# Patient Record
Sex: MALE | URBAN - METROPOLITAN AREA
[De-identification: names, ages, dates, MRNs, and addresses within clinical notes are randomized per-mention and may not be internally consistent; named-entity substitution may affect disease eponyms.]

---

## 2019-03-28 ENCOUNTER — COMMUNICATION - HEALTHEAST (OUTPATIENT)
Dept: HEALTH INFORMATION MANAGEMENT | Facility: CLINIC | Age: 69
End: 2019-03-28

## 2021-06-19 NOTE — LETTER
Letter by Mika Mccoy at      Author: Mika Mccoy Service: -- Author Type: --    Filed:  Encounter Date: 3/28/2019 Status: (Other)         2019       Malcom Chowdhury  [address]  [facility] [address]    Dear Malcom Chowdhury:    We are pleased to provide you with secure, online access to medical information for you and your family. Per your request, we have expanded your account to allow access to the records of the following family members:              Tito ANDREW Chowdhury (privilege never ends.)     How Do I Login?  1. In your Internet browser, go to [email or web address]  2. Click on Sign Up Now   3. Enter your [employer] Activation Code exactly as it appears below. This code will  14 days after it is generated. You will not need to use this code after you have completed the sign-up process. If you do not sign up before the expiration date, you must request a new code.     [employer] Activation Code: [email or web address]  Expires: 2019  9:13 AM    4. Enter in your date of birth and zip code.  5. Create a [employer] username. Think of one that is secure and easy to remember.  Your username must be between 6 and 20 characters.  6. Create a [employer] password. You can change your password at any time. Your password must be between 8 and 45 characters, contain at least two letters and one number, and contain both upper and lower case letters.  7. Choose a security question, enter your answer, and click Next. This can be used to access [employer] if you forget your password.   8. Enter a valid e-mail address to receive e-mail notifications when new information is available in [employer].  9. Click Sign In.        How Do I Access a Family Member's Account?  10. Select the account you want to access by clicking the [place] with the appropriate patient's name at the top of your screen.   11. You will see a disclaimer page letting you know that you will be viewing a family member's record. Review the  disclaimer and then click Accept Proxy Access Disclaimer to proceed.  12. Once you switch to viewing a family member's record, you can navigate to "[employer] pages the same way you would for yourself. You can return to your own account by clicking the [place] at the top of the screen with your name on it.    13. To customize colors and names of the linked accounts, you can select Personalize from the Profile dropdown menu at the top of the screen, then click the Edit button to make changes.      Additional Information  If you have questions, you can e-mail [email or web address] or call [phone] to talk to our [facility] "[employer] staff. Remember, "[employer] is NOT to be used for urgent needs. For medical emergencies, dial 911.

## 2025-07-02 ENCOUNTER — APPOINTMENT (OUTPATIENT)
Dept: CT IMAGING | Facility: HOSPITAL | Age: 75
End: 2025-07-02
Attending: EMERGENCY MEDICINE
Payer: COMMERCIAL

## 2025-07-02 ENCOUNTER — HOSPITAL ENCOUNTER (INPATIENT)
Facility: HOSPITAL | Age: 75
End: 2025-07-02
Attending: EMERGENCY MEDICINE | Admitting: STUDENT IN AN ORGANIZED HEALTH CARE EDUCATION/TRAINING PROGRAM
Payer: COMMERCIAL

## 2025-07-02 DIAGNOSIS — R18.8 OTHER ASCITES: ICD-10-CM

## 2025-07-02 DIAGNOSIS — K70.31 ALCOHOLIC CIRRHOSIS OF LIVER WITH ASCITES (H): ICD-10-CM

## 2025-07-02 DIAGNOSIS — I89.0 LYMPHEDEMA: Primary | ICD-10-CM

## 2025-07-02 DIAGNOSIS — G25.81 RESTLESS LEGS SYNDROME (RLS): ICD-10-CM

## 2025-07-02 DIAGNOSIS — K92.2 UPPER GI BLEED: ICD-10-CM

## 2025-07-02 DIAGNOSIS — R19.5 LOOSE STOOLS: ICD-10-CM

## 2025-07-02 LAB
ABO + RH BLD: NORMAL
ALBUMIN SERPL BCG-MCNC: 3.3 G/DL (ref 3.5–5.2)
ALBUMIN UR-MCNC: NEGATIVE MG/DL
ALP SERPL-CCNC: 190 U/L (ref 40–150)
ALT SERPL W P-5'-P-CCNC: 44 U/L (ref 0–70)
ANION GAP SERPL CALCULATED.3IONS-SCNC: 16 MMOL/L (ref 7–15)
APPEARANCE UR: CLEAR
AST SERPL W P-5'-P-CCNC: 161 U/L (ref 0–45)
BASOPHILS # BLD AUTO: 0.1 10E3/UL (ref 0–0.2)
BASOPHILS NFR BLD AUTO: 2 %
BILIRUB SERPL-MCNC: 5.8 MG/DL
BILIRUB UR QL STRIP: NEGATIVE
BLD GP AB SCN SERPL QL: NEGATIVE
BUN SERPL-MCNC: 7.9 MG/DL (ref 8–23)
CALCIUM SERPL-MCNC: 8.7 MG/DL (ref 8.8–10.4)
CHLORIDE SERPL-SCNC: 98 MMOL/L (ref 98–107)
COLOR UR AUTO: YELLOW
CREAT SERPL-MCNC: 0.58 MG/DL (ref 0.67–1.17)
EGFRCR SERPLBLD CKD-EPI 2021: >90 ML/MIN/1.73M2
EOSINOPHIL # BLD AUTO: 0.1 10E3/UL (ref 0–0.7)
EOSINOPHIL NFR BLD AUTO: 1 %
ERYTHROCYTE [DISTWIDTH] IN BLOOD BY AUTOMATED COUNT: 14.1 % (ref 10–15)
EST. AVERAGE GLUCOSE BLD GHB EST-MCNC: 114 MG/DL
ETHANOL SERPL-MCNC: 0.09 G/DL
GLUCOSE BLDC GLUCOMTR-MCNC: 100 MG/DL (ref 70–99)
GLUCOSE SERPL-MCNC: 141 MG/DL (ref 70–99)
GLUCOSE UR STRIP-MCNC: NEGATIVE MG/DL
HBA1C MFR BLD: 5.6 %
HCO3 SERPL-SCNC: 21 MMOL/L (ref 22–29)
HCT VFR BLD AUTO: 39.4 % (ref 40–53)
HGB BLD-MCNC: 13.3 G/DL (ref 13.3–17.7)
HGB UR QL STRIP: NEGATIVE
HOLD SPECIMEN: NORMAL
HOLD SPECIMEN: NORMAL
IMM GRANULOCYTES # BLD: 0 10E3/UL
IMM GRANULOCYTES NFR BLD: 0 %
INR PPP: 1.44 (ref 0.85–1.15)
KETONES UR STRIP-MCNC: 10 MG/DL
LEUKOCYTE ESTERASE UR QL STRIP: NEGATIVE
LYMPHOCYTES # BLD AUTO: 1 10E3/UL (ref 0.8–5.3)
LYMPHOCYTES NFR BLD AUTO: 13 %
MAGNESIUM SERPL-MCNC: 1.7 MG/DL (ref 1.7–2.3)
MCH RBC QN AUTO: 33.9 PG (ref 26.5–33)
MCHC RBC AUTO-ENTMCNC: 33.8 G/DL (ref 31.5–36.5)
MCV RBC AUTO: 101 FL (ref 78–100)
MONOCYTES # BLD AUTO: 0.9 10E3/UL (ref 0–1.3)
MONOCYTES NFR BLD AUTO: 11 %
NEUTROPHILS # BLD AUTO: 5.8 10E3/UL (ref 1.6–8.3)
NEUTROPHILS NFR BLD AUTO: 73 %
NITRATE UR QL: NEGATIVE
NRBC # BLD AUTO: 0 10E3/UL
NRBC BLD AUTO-RTO: 0 /100
PH UR STRIP: 6 [PH] (ref 5–7)
PHOSPHATE SERPL-MCNC: 2.6 MG/DL (ref 2.5–4.5)
PLATELET # BLD AUTO: 213 10E3/UL (ref 150–450)
POTASSIUM SERPL-SCNC: 3.6 MMOL/L (ref 3.4–5.3)
PROT SERPL-MCNC: 7.5 G/DL (ref 6.4–8.3)
PROTHROMBIN TIME: 17.8 SECONDS (ref 11.8–14.8)
RBC # BLD AUTO: 3.92 10E6/UL (ref 4.4–5.9)
RBC URINE: <1 /HPF
SODIUM SERPL-SCNC: 135 MMOL/L (ref 135–145)
SP GR UR STRIP: 1.01 (ref 1–1.03)
SPECIMEN EXP DATE BLD: NORMAL
UROBILINOGEN UR STRIP-MCNC: NORMAL MG/DL
WBC # BLD AUTO: 7.9 10E3/UL (ref 4–11)
WBC URINE: 1 /HPF

## 2025-07-02 PROCEDURE — 85004 AUTOMATED DIFF WBC COUNT: CPT | Performed by: EMERGENCY MEDICINE

## 2025-07-02 PROCEDURE — 250N000013 HC RX MED GY IP 250 OP 250 PS 637: Performed by: STUDENT IN AN ORGANIZED HEALTH CARE EDUCATION/TRAINING PROGRAM

## 2025-07-02 PROCEDURE — 85610 PROTHROMBIN TIME: CPT | Performed by: EMERGENCY MEDICINE

## 2025-07-02 PROCEDURE — 250N000011 HC RX IP 250 OP 636: Performed by: EMERGENCY MEDICINE

## 2025-07-02 PROCEDURE — 86901 BLOOD TYPING SEROLOGIC RH(D): CPT | Performed by: EMERGENCY MEDICINE

## 2025-07-02 PROCEDURE — 86900 BLOOD TYPING SEROLOGIC ABO: CPT | Performed by: EMERGENCY MEDICINE

## 2025-07-02 PROCEDURE — 82607 VITAMIN B-12: CPT | Performed by: STUDENT IN AN ORGANIZED HEALTH CARE EDUCATION/TRAINING PROGRAM

## 2025-07-02 PROCEDURE — 83036 HEMOGLOBIN GLYCOSYLATED A1C: CPT | Performed by: STUDENT IN AN ORGANIZED HEALTH CARE EDUCATION/TRAINING PROGRAM

## 2025-07-02 PROCEDURE — 83735 ASSAY OF MAGNESIUM: CPT | Performed by: STUDENT IN AN ORGANIZED HEALTH CARE EDUCATION/TRAINING PROGRAM

## 2025-07-02 PROCEDURE — P9047 ALBUMIN (HUMAN), 25%, 50ML: HCPCS | Performed by: STUDENT IN AN ORGANIZED HEALTH CARE EDUCATION/TRAINING PROGRAM

## 2025-07-02 PROCEDURE — 36415 COLL VENOUS BLD VENIPUNCTURE: CPT | Performed by: STUDENT IN AN ORGANIZED HEALTH CARE EDUCATION/TRAINING PROGRAM

## 2025-07-02 PROCEDURE — 84100 ASSAY OF PHOSPHORUS: CPT | Performed by: STUDENT IN AN ORGANIZED HEALTH CARE EDUCATION/TRAINING PROGRAM

## 2025-07-02 PROCEDURE — 74174 CTA ABD&PLVS W/CONTRAST: CPT

## 2025-07-02 PROCEDURE — 96374 THER/PROPH/DIAG INJ IV PUSH: CPT | Mod: 59

## 2025-07-02 PROCEDURE — HZ2ZZZZ DETOXIFICATION SERVICES FOR SUBSTANCE ABUSE TREATMENT: ICD-10-PCS | Performed by: HOSPITALIST

## 2025-07-02 PROCEDURE — 80053 COMPREHEN METABOLIC PANEL: CPT | Performed by: EMERGENCY MEDICINE

## 2025-07-02 PROCEDURE — 258N000003 HC RX IP 258 OP 636: Performed by: EMERGENCY MEDICINE

## 2025-07-02 PROCEDURE — 82077 ASSAY SPEC XCP UR&BREATH IA: CPT | Performed by: EMERGENCY MEDICINE

## 2025-07-02 PROCEDURE — 250N000011 HC RX IP 250 OP 636: Performed by: STUDENT IN AN ORGANIZED HEALTH CARE EDUCATION/TRAINING PROGRAM

## 2025-07-02 PROCEDURE — 258N000003 HC RX IP 258 OP 636: Performed by: STUDENT IN AN ORGANIZED HEALTH CARE EDUCATION/TRAINING PROGRAM

## 2025-07-02 PROCEDURE — 99223 1ST HOSP IP/OBS HIGH 75: CPT | Performed by: STUDENT IN AN ORGANIZED HEALTH CARE EDUCATION/TRAINING PROGRAM

## 2025-07-02 PROCEDURE — 36415 COLL VENOUS BLD VENIPUNCTURE: CPT | Performed by: EMERGENCY MEDICINE

## 2025-07-02 PROCEDURE — 87506 IADNA-DNA/RNA PROBE TQ 6-11: CPT | Performed by: EMERGENCY MEDICINE

## 2025-07-02 PROCEDURE — 120N000001 HC R&B MED SURG/OB

## 2025-07-02 PROCEDURE — 81001 URINALYSIS AUTO W/SCOPE: CPT | Performed by: EMERGENCY MEDICINE

## 2025-07-02 PROCEDURE — 99285 EMERGENCY DEPT VISIT HI MDM: CPT | Mod: 25

## 2025-07-02 PROCEDURE — 250N000012 HC RX MED GY IP 250 OP 636 PS 637: Mod: JA | Performed by: EMERGENCY MEDICINE

## 2025-07-02 RX ORDER — ONDANSETRON 2 MG/ML
4 INJECTION INTRAMUSCULAR; INTRAVENOUS EVERY 6 HOURS PRN
Status: ACTIVE | OUTPATIENT
Start: 2025-07-02

## 2025-07-02 RX ORDER — CEFTRIAXONE 2 G/1
2 INJECTION, POWDER, FOR SOLUTION INTRAMUSCULAR; INTRAVENOUS ONCE
Status: DISCONTINUED | OUTPATIENT
Start: 2025-07-02 | End: 2025-07-02

## 2025-07-02 RX ORDER — OXYCODONE HYDROCHLORIDE 5 MG/1
5 TABLET ORAL EVERY 4 HOURS PRN
Refills: 0 | Status: ACTIVE | OUTPATIENT
Start: 2025-07-02

## 2025-07-02 RX ORDER — AMOXICILLIN 250 MG
2 CAPSULE ORAL 2 TIMES DAILY PRN
Status: ACTIVE | OUTPATIENT
Start: 2025-07-02

## 2025-07-02 RX ORDER — LIDOCAINE 40 MG/G
CREAM TOPICAL
Status: ACTIVE | OUTPATIENT
Start: 2025-07-02

## 2025-07-02 RX ORDER — ALBUMIN (HUMAN) 12.5 G/50ML
25-50 SOLUTION INTRAVENOUS ONCE
Status: COMPLETED | OUTPATIENT
Start: 2025-07-03 | End: 2025-07-03

## 2025-07-02 RX ORDER — HYDRALAZINE HYDROCHLORIDE 10 MG/1
10 TABLET, FILM COATED ORAL EVERY 4 HOURS PRN
Status: ACTIVE | OUTPATIENT
Start: 2025-07-02

## 2025-07-02 RX ORDER — CEFTRIAXONE 2 G/1
2 INJECTION, POWDER, FOR SOLUTION INTRAMUSCULAR; INTRAVENOUS EVERY 24 HOURS
Status: DISPENSED | OUTPATIENT
Start: 2025-07-02 | End: 2025-07-05

## 2025-07-02 RX ORDER — ONDANSETRON 4 MG/1
4 TABLET, ORALLY DISINTEGRATING ORAL EVERY 6 HOURS PRN
Status: ACTIVE | OUTPATIENT
Start: 2025-07-02

## 2025-07-02 RX ORDER — ALBUMIN (HUMAN) 12.5 G/50ML
25-50 SOLUTION INTRAVENOUS ONCE
Status: DISCONTINUED | OUTPATIENT
Start: 2025-07-02 | End: 2025-07-02

## 2025-07-02 RX ORDER — CALCIUM CARBONATE 500 MG/1
1000 TABLET, CHEWABLE ORAL 4 TIMES DAILY PRN
Status: DISPENSED | OUTPATIENT
Start: 2025-07-02

## 2025-07-02 RX ORDER — HYDRALAZINE HYDROCHLORIDE 20 MG/ML
10 INJECTION INTRAMUSCULAR; INTRAVENOUS EVERY 4 HOURS PRN
Status: ACTIVE | OUTPATIENT
Start: 2025-07-02

## 2025-07-02 RX ORDER — AMOXICILLIN 250 MG
1 CAPSULE ORAL 2 TIMES DAILY PRN
Status: ACTIVE | OUTPATIENT
Start: 2025-07-02

## 2025-07-02 RX ORDER — ALBUMIN (HUMAN) 12.5 G/50ML
50 SOLUTION INTRAVENOUS ONCE
Status: COMPLETED | OUTPATIENT
Start: 2025-07-02 | End: 2025-07-02

## 2025-07-02 RX ORDER — SODIUM CHLORIDE 9 MG/ML
INJECTION, SOLUTION INTRAVENOUS CONTINUOUS
Status: DISCONTINUED | OUTPATIENT
Start: 2025-07-02 | End: 2025-07-03

## 2025-07-02 RX ORDER — MAGNESIUM SULFATE 4 G/50ML
4 INJECTION INTRAVENOUS ONCE
Status: COMPLETED | OUTPATIENT
Start: 2025-07-02 | End: 2025-07-03

## 2025-07-02 RX ORDER — IOPAMIDOL 755 MG/ML
90 INJECTION, SOLUTION INTRAVASCULAR ONCE
Status: COMPLETED | OUTPATIENT
Start: 2025-07-02 | End: 2025-07-02

## 2025-07-02 RX ADMIN — ALBUMIN HUMAN 50 G: 0.25 SOLUTION INTRAVENOUS at 21:53

## 2025-07-02 RX ADMIN — IOPAMIDOL 90 ML: 755 INJECTION, SOLUTION INTRAVENOUS at 17:37

## 2025-07-02 RX ADMIN — CEFTRIAXONE SODIUM 2 G: 2 INJECTION, POWDER, FOR SOLUTION INTRAMUSCULAR; INTRAVENOUS at 19:38

## 2025-07-02 RX ADMIN — SODIUM CHLORIDE: 0.9 INJECTION, SOLUTION INTRAVENOUS at 19:42

## 2025-07-02 RX ADMIN — OCTREOTIDE ACETATE 50 MCG/HR: 500 INJECTION, SOLUTION INTRAVENOUS; SUBCUTANEOUS at 19:14

## 2025-07-02 RX ADMIN — OCTREOTIDE ACETATE 50 MCG/HR: 500 INJECTION, SOLUTION INTRAVENOUS; SUBCUTANEOUS at 21:55

## 2025-07-02 RX ADMIN — PANTOPRAZOLE SODIUM 80 MG: 40 INJECTION, POWDER, FOR SOLUTION INTRAVENOUS at 17:37

## 2025-07-02 RX ADMIN — MAGNESIUM SULFATE HEPTAHYDRATE 4 G: 4 INJECTION, SOLUTION INTRAVENOUS at 22:04

## 2025-07-02 RX ADMIN — Medication 5 MG: at 23:40

## 2025-07-02 ASSESSMENT — ACTIVITIES OF DAILY LIVING (ADL)
ADLS_ACUITY_SCORE: 41
ADLS_ACUITY_SCORE: 28
ADLS_ACUITY_SCORE: 41
ADLS_ACUITY_SCORE: 41
ADLS_ACUITY_SCORE: 28
ADLS_ACUITY_SCORE: 28
ADLS_ACUITY_SCORE: 41
ADLS_ACUITY_SCORE: 41

## 2025-07-02 ASSESSMENT — COLUMBIA-SUICIDE SEVERITY RATING SCALE - C-SSRS
1. IN THE PAST MONTH, HAVE YOU WISHED YOU WERE DEAD OR WISHED YOU COULD GO TO SLEEP AND NOT WAKE UP?: NO
6. HAVE YOU EVER DONE ANYTHING, STARTED TO DO ANYTHING, OR PREPARED TO DO ANYTHING TO END YOUR LIFE?: NO
2. HAVE YOU ACTUALLY HAD ANY THOUGHTS OF KILLING YOURSELF IN THE PAST MONTH?: NO

## 2025-07-02 NOTE — ED PROVIDER NOTES
EMERGENCY DEPARTMENT ENCOUNTER      NAME: Malcom Chowdhury  AGE: 75 year old male  YOB: 1950  MRN: [id number]  EVALUATION DATE & TIME: No admission date for patient encounter.    PCP: No primary care provider on file.    ED PROVIDER: Mario Cortez MD      Chief Complaint   Patient presents with    Melena         FINAL IMPRESSION:  1. Upper GI bleed    2. Other ascites          ED COURSE & MEDICAL DECISION MAKIN:52 PM I met with the patient, obtained history, performed an initial exam, and discussed options and plan for diagnostics and treatment here in the ED.   Pertinent Labs & Imaging studies reviewed. (See chart for details)    75 year old male presents to the Emergency Department for evaluation of black tarry stool    On exam he is jaundice and distended abdomen    Doubt SBP.  I did look at his stool in the commode and it was indeed melena.  [place]une hemoglobin is normal.  Blood alcohol 0.09.  Clinically not in withdrawal currently.  Does have varices will give octreotide and ceftriaxone.  Rash as a child with penicillin    Moderate ascites.  Would likely benefit from a therapeutic paracentesis tomorrow    Wants to quit drinking.    INR slightly elevated.  Platelets normal.  Hemoglobin normal    Suspect upper GI bleed.  Differential includes variceal bleed, ulcer, dark stools secondary to iron or infection    Hemodynamically stable        ED Course as of 25 185 Spoke with GI who agrees with octreotide ceftriaxone Protonix keep n.p.o. and plan for endoscopy tomorrow for presumed variceal bleed   185 Bilirubin Total(!): 5.8   185 Albumin(!): 3.3   185 AST(!): 161   1856 Glucose(!): 141   6 Creatinine(!): 0.58   185 Urea Nitrogen(!): 7.9   185 Anion Gap(!): 16   1856 Carbon Dioxide (CO2)(!): 21   185 INR(!): 1.44   185 PT(!): 17.8    WBC: 7.9    Spoke with admitting team agrees to admit patient       Medical Decision Making  Care impacted by Alcohol  and/or Drug Abuse or Dependence  I independently interpreted the CT and note ascites. See radiology report for final interpretation.  I discussed the care with another health care provider: GI, admitting team  Admit.    MIPS (CTPE, Dental pain, Erazo, Sinusitis, Asthma/COPD, Head Trauma): CT Pulmonary Angiogram:CT PA was ordered for reason(s) other than PE.    SEPSIS: None              At the conclusion of the encounter I discussed the results of all of the tests and the disposition. The questions were answered. The patient or family acknowledged understanding and was agreeable with the care plan.         Voice recognition software used for this note,  any grammatical or spelling errors are non-intentional. Please contact the author of this note directly if you are in need of any clarification.      MEDICATIONS GIVEN IN THE EMERGENCY:  Medications   octreotide (sandoSTATIN) 1,250 mcg in D5W 250 mL (has no administration in time range)   sodium chloride 0.9 % infusion (has no administration in time range)   albumin human 25 % injection 50 g (has no administration in time range)   albumin human 25 % injection 25-50 g (has no administration in time range)   lidocaine 1 % 0.1-1 mL (has no administration in time range)   lidocaine (LMX4) cream (has no administration in time range)   sodium chloride (PF) 0.9% PF flush 3 mL (has no administration in time range)   sodium chloride (PF) 0.9% PF flush 3 mL (has no administration in time range)   senna-docusate (SENOKOT-S/PERICOLACE) 8.6-50 MG per tablet 1 tablet (has no administration in time range)     Or   senna-docusate (SENOKOT-S/PERICOLACE) 8.6-50 MG per tablet 2 tablet (has no administration in time range)   calcium carbonate (TUMS) chewable tablet 1,000 mg (has no administration in time range)   hydrALAZINE (APRESOLINE) tablet 10 mg (has no administration in time range)     Or   hydrALAZINE (APRESOLINE) injection 10 mg (has no administration in time range)   oxyCODONE  IR (ROXICODONE) half-tab 2.5 mg (has no administration in time range)   oxyCODONE (ROXICODONE) tablet 5 mg (has no administration in time range)   cefTRIAXone (ROCEPHIN) 1 g vial to attach to  mL bag for ADULTS or NS 50 mL bag for PEDS (has no administration in time range)   pantoprazole (PROTONIX) injection 40 mg (has no administration in time range)   ondansetron (ZOFRAN ODT) ODT tab 4 mg (has no administration in time range)     Or   ondansetron (ZOFRAN) injection 4 mg (has no administration in time range)   pantoprazole (PROTONIX) injection 80 mg (80 mg Intravenous $Given 7/2/25 1737)   iopamidol (ISOVUE-370) solution 90 mL (90 mLs Intravenous $Given 7/2/25 1737)       NEW PRESCRIPTIONS STARTED AT TODAY'S ER VISIT  New Prescriptions    No medications on file          =================================================================    TRIAGE ASSESSMENT:  Pt ambulates into ED with with black tarry stools since last night.  Sometimes fills the toilet with blood.  Pt has round distended abdomen causing SOB.  Pt has been a heavy drinker of alcohol since age 23.  Pt appears jaundice with yellowing sclera.  Pt is actively wanting to quit.     Triage Assessment (Adult)       Row Name 07/02/25 1428          Triage Assessment    Airway WDL WDL        Respiratory WDL    Respiratory WDL WDL                          HPI    Patient information was obtained from: The patient     Use of : N/A     Malcom Chowdhury is a 75 year old male  who presents to this ED via family escort for evaluation of melena. Patient states he's been having black and tarry stool in 5 PM yesterday (7/1/2025). He reports abdominal swelling and leg swelling within the past two months which he accredits to aversion to red meat. Today, patient had over 10 episodes of black tarry stool, and fet lightheaded which prompted arrival to ED. He explains he hast eaten anything today.     Patient denies any vomiting fever, chills. He denies any  "history of stomach ulcers. He is not anticoagulated.He does not currently have a PCP.      REVIEW OF SYSTEMS   Review of Systems     PAST MEDICAL HISTORY:  No past medical history on file.    PAST SURGICAL HISTORY:  No past surgical history on file.        CURRENT MEDICATIONS:    No current outpatient medications on file.      ALLERGIES:  Allergies   Allergen Reactions    Penicillins Unknown    Sulfa Antibiotics Unknown       FAMILY HISTORY:  No family history on file.    SOCIAL HISTORY:        VITALS:  /74   Pulse 97   Temp 99.6  F (37.6  C) (Temporal)   Resp 28   Ht 1.753 m (5' 9\")   Wt 115.7 kg (255 lb)   SpO2 94%   BMI 37.66 kg/m      PHYSICAL EXAM      Vitals: /74   Pulse 97   Temp 99.6  F (37.6  C) (Temporal)   Resp 28   Ht 1.753 m (5' 9\")   Wt 115.7 kg (255 lb)   SpO2 94%   BMI 37.66 kg/m    General: Appears in no acute distress, awake, alert, interactive.  Eyes: Conjunctivae non-injected. Sclera anicteric.  HENT: Atraumatic.  Neck: Supple.  Respiratory/Chest: Respiration unlabored.  Abdomen: Distended abdomen, no tenderness  Musculoskeletal: Normal extremities. Lower extremity edema.  Skin: Jaundice  Neurologic: Face symmetric, moves all extremities spontaneously. Speech clear.  Psychiatric: Oriented to person, place, and time. Affect appropriate.    LAB:  All pertinent labs reviewed and interpreted.  Results for orders placed or performed during the hospital encounter of 07/02/25   CTA GI Bleed    Impression    IMPRESSION:  1.  No evidence for active GI bleed.  2.  Cirrhosis with portal venous hypertension, including moderate ascites, numerous portosystemic collaterals, and splenomegaly.  3.  Innumerable hypodense nodules throughout the liver, most likely representing regenerative nodules. MRI recommended for further evaluation.  4.  Wall thickening involving the ascending colon and proximal small bowel could be secondary to portal enterocolopathy secondary to underlying liver " disease.  5.  Several enlarged gastrohepatic and althea hepatis lymph nodes, possibly reactive.    Comprehensive metabolic panel   Result Value Ref Range    Sodium 135 135 - 145 mmol/L    Potassium 3.6 3.4 - 5.3 mmol/L    Carbon Dioxide (CO2) 21 (L) 22 - 29 mmol/L    Anion Gap 16 (H) 7 - 15 mmol/L    Urea Nitrogen 7.9 (L) 8.0 - 23.0 mg/dL    Creatinine 0.58 (L) 0.67 - 1.17 mg/dL    GFR Estimate >90 >60 mL/min/1.73m2    Calcium 8.7 (L) 8.8 - 10.4 mg/dL    Chloride 98 98 - 107 mmol/L    Glucose 141 (H) 70 - 99 mg/dL    Alkaline Phosphatase 190 (H) 40 - 150 U/L     (H) 0 - 45 U/L    ALT 44 0 - 70 U/L    Protein Total 7.5 6.4 - 8.3 g/dL    Albumin 3.3 (L) 3.5 - 5.2 g/dL    Bilirubin Total 5.8 (H) <=1.2 mg/dL   INR   Result Value Ref Range    INR 1.44 (H) 0.85 - 1.15    PT 17.8 (H) 11.8 - 14.8 Seconds   Result Value Ref Range    Ethanol Level Blood 0.09 (H) <=0.01 g/dL   CBC with platelets and differential   Result Value Ref Range    WBC Count 7.9 4.0 - 11.0 10e3/uL    RBC Count 3.92 (L) 4.40 - 5.90 10e6/uL    Hemoglobin 13.3 13.3 - 17.7 g/dL    Hematocrit 39.4 (L) 40.0 - 53.0 %     (H) 78 - 100 fL    MCH 33.9 (H) 26.5 - 33.0 pg    MCHC 33.8 31.5 - 36.5 g/dL    RDW 14.1 10.0 - 15.0 %    Platelet Count 213 150 - 450 10e3/uL    % Neutrophils 73 %    % Lymphocytes 13 %    % Monocytes 11 %    % Eosinophils 1 %    % Basophils 2 %    % Immature Granulocytes 0 %    NRBCs per 100 WBC 0 <1 /100    Absolute Neutrophils 5.8 1.6 - 8.3 10e3/uL    Absolute Lymphocytes 1.0 0.8 - 5.3 10e3/uL    Absolute Monocytes 0.9 0.0 - 1.3 10e3/uL    Absolute Eosinophils 0.1 0.0 - 0.7 10e3/uL    Absolute Basophils 0.1 0.0 - 0.2 10e3/uL    Absolute Immature Granulocytes 0.0 <=0.4 10e3/uL    Absolute NRBCs 0.0 10e3/uL   UA with Microscopic reflex to Culture    Specimen: Urine, NOS   Result Value Ref Range    Color Urine Yellow Colorless, Straw, Light Yellow, Yellow    Appearance Urine Clear Clear    Glucose Urine Negative Negative mg/dL     Bilirubin Urine Negative Negative    Ketones Urine 10 (A) Negative mg/dL    Specific Gravity Urine 1.014 1.001 - 1.030    Blood Urine Negative Negative    pH Urine 6.0 5.0 - 7.0    Protein Albumin Urine Negative Negative mg/dL    Urobilinogen Urine Normal Normal mg/dL    Nitrite Urine Negative Negative    Leukocyte Esterase Urine Negative Negative    RBC Urine <1 <=2 /HPF    WBC Urine 1 <=5 /HPF   Adult Type and Screen   Result Value Ref Range    ABO/RH(D) A POS     Antibody Screen Negative Negative    SPECIMEN EXPIRATION DATE 7/5/2025 11:59:00 PM CDT        RADIOLOGY:  Reviewed all pertinent imaging. Please see official radiology report.  CTA GI Bleed   Final Result   IMPRESSION:   1.  No evidence for active GI bleed.   2.  Cirrhosis with portal venous hypertension, including moderate ascites, numerous portosystemic collaterals, and splenomegaly.   3.  Innumerable hypodense nodules throughout the liver, most likely representing regenerative nodules. MRI recommended for further evaluation.   4.  Wall thickening involving the ascending colon and proximal small bowel could be secondary to portal enterocolopathy secondary to underlying liver disease.   5.  Several enlarged gastrohepatic and althea hepatis lymph nodes, possibly reactive.                 I, Otto Farmer, am serving as a scribe to document services personally performed by Mario Cortez MD based on my observation and the provider's statements to me. I, Mario Cortze MD, attest that Otto Farmer is acting in a scribe capacity, has observed my performance of the services and has documented them in accordance with my direction.    Mario Cortez MD  [facility] EMERGENCY DEPARTMENT  [address] [place] [id number] [phone]      Mario Cortez MD  07/02/25 3703

## 2025-07-02 NOTE — ED TRIAGE NOTES
Pt ambulates into ED with with black tarry stools since last night.  Sometimes fills the toilet with blood.  Pt has round distended abdomen causing SOB.  Pt has been a heavy drinker of alcohol since age 23.  Pt appears jaundice with yellowing sclera.  Pt is actively wanting to quit.     Triage Assessment (Adult)       Row Name 07/02/25 1428          Triage Assessment    Airway WDL WDL        Respiratory WDL    Respiratory WDL WDL

## 2025-07-02 NOTE — H&P
[facility]    History and Physical - Hospitalist Service       Date of Admission:  7/2/2025    Assessment & Plan    Assessment:  Malcom Chowdhury is a 75 year old male with no significant past medical history presented to the ED for evaluation of melena, patient states that he has been having black and tarry stools since yesterday, has a history of alcohol abuse, imaging evidence of liver cirrhosis with portal hypertension, ED provider discussed with gastroenterology who recommended ceftriaxone, Protonix, octreotide and admission for possible EGD in the morning, patient is going to be admitted for melena most likely stemming from Variceal bleeding secondary to alcohol related chronic liver disease.    Problem List and Plan:  Melena most likely stemming from Variceal bleeding secondary to alcohol related chronic liver disease.  Transaminitis and hyperbilirubinemia secondary to above  Chronic liver disease secondary to alcohol abuse  Moderate ascites secondary to chronic liver disease  Reactive tachycardia  Patient presented to the ED for evaluation of melena  Patient states that he has been having black and tarry stools since yesterday, also appears to have abdominal swelling and leg swelling within the past 2 months  Patient has had 10 episodes of black tarry stool along with lightheadedness which prompted him to come to the hospital  Has a history of alcohol abuse and currently does not follow any PCP outpatient  In the ER vitals significant for minimal tachycardia and elevated blood pressure that has since improved  Labs significant for transaminitis and hyperbilirubinemia, INR elevated at 1.44, ethanol level of 0.09  CT abdomen pelvis to rule out active GI bleeding was performed which showed No evidence for active GI bleed. Cirrhosis with portal venous hypertension, including moderate ascites, numerous portosystemic collaterals, and splenomegaly. Innumerable hypodense nodules throughout the  liver, most likely representing regenerative nodules. Wall thickening involving the ascending colon and proximal small bowel could be secondary to portal enterocolopathy secondary to underlying liver disease. Several enlarged gastrohepatic and althea hepatis lymph nodes, possibly reactive.   ED provider discussed with gastroenterology who recommended ceftriaxone, Protonix, octreotide and admission for possible EGD in the morning  Trend LFTs  Follow-up GI for further recommendations  On clear liquids and n.p.o. at midnight for possible EGD in the morning  Continue with pain management as per protocol  Continue with ceftriaxone prophylactically along with Protonix 40 twice daily and octreotide as per GI recommendations  Because of liver nodules MRI liver with and without contrast ordered to rule out  malignancy  Monitor on telemetry monitoring    History of alcohol abuse and dependence at risk of withdrawal  As per patient drinks a lot of whiskey but did not quantify how much  Ethanol level 0.09  No current signs of withdrawal  Ordered CIWA scale, placed on CIWA protocol with signs of withdrawal    Anion gap metabolic acidosis  Hypoalbuminemia  Electrolyte abnormality/hypokalemia/Hypomagnesemia  Gentle IV hydration and albumin repletion  Monitor BMP and albumin levels  Repleted potassium and magnesium, monitor potassium and magnesium level closely    Coagulopathy secondary to chronic liver disease  INR 1.44  Monitor INR  Will order 1 dose of vitamin K because of elevated INR and melena    Hyperglycemia most likely reactive  Hemoglobin A1c 5.6  Monitor blood sugar level intermittently    Elevated blood pressure without a diagnosis of hypertension  Monitor vital signs as per protocol  IV hydralazine as needed for elevated blood pressure  May add blood pressure medication if blood pressure continues to be elevated    DVT PPx  Intermittent pneumatic compression    CODE STATUS  Full code as per discussion with the patient    "     Diet: Combination Diet Clear Liquid  NPO for Procedure/Surgery per Anesthesia Guidelines Except for: Meds; Clear liquids before procedure/surgery: ADULT (Age GREATER than or Equal to 18 years) - Clear liquids 2 hours before procedure/surgery    DVT Prophylaxis: Pneumatic Compression Devices  Erazo Catheter: Not present  Lines: None     Cardiac Monitoring: ACTIVE order. Indication: Tachyarrhythmias, acute (48 hours)  Code Status: Full Code  Mental status: Patient is alert awake and oriented x 3, patient is a good Historian and most of the history was obtained from the patient, some history was obtained from chart review and the rest of the history was obtained from discussion with the ER physician  Clinically Significant Risk Factors Present on Admission               # Hypoalbuminemia: Lowest albumin = 3.3 g/dL at 7/2/2025  4:08 PM, will monitor as appropriate    # Coagulation Defect: INR = 1.44 (Ref range: 0.85 - 1.15) and/or PTT = N/A, will monitor for bleeding              # Obesity: Estimated body mass index is 37.06 kg/m  as calculated from the following:    Height as of this encounter: 1.753 m (5' 9.02\").    Weight as of this encounter: 113.9 kg (251 lb 1.7 oz).              Disposition Plan     Medically Ready for Discharge: Anticipated in 2-4 Days     Saad J. Gondal, MD  Hospitalist Service  [facility]  Securely message with [employer] (more info)  Text page via [facility] Paging/Directory     ______________________________________________________________________    Chief Complaint   Black tarry stools    History is obtained from the patient and Chart review    History of Present Illness   Malcom Chowdhury is a 75 year old male with no significant past medical history presented to the ED for evaluation of melena, patient states that he has been having black and tarry stools since yesterday, also appears to have abdominal swelling and leg swelling within the past 2 months, patient has had " 10 episodes of black tarry stool along with lightheadedness which prompted him to come to the hospital, has a history of alcohol abuse and currently does not follow any PCP outpatient, In the ER vitals significant for minimal tachycardia and elevated blood pressure  that has since improved, labs significant for anion gap metabolic acidosis, hypoalbuminemia, transaminitis and hyperbilirubinemia, hyperglycemia, INR elevated at 1.44, ethanol level of 0.09, CT abdomen pelvis to rule out active GI bleeding was performed which showed No evidence for active GI bleed. Cirrhosis with portal venous hypertension, including moderate ascites, numerous portosystemic collaterals, and splenomegaly. Innumerable hypodense nodules throughout the liver, most likely representing regenerative nodules.  Wall thickening involving the ascending colon and proximal small bowel could be secondary to portal enterocolopathy secondary to underlying liver disease. Several enlarged gastrohepatic and althea hepatis lymph nodes, possibly reactive.  ED provider discussed with gastroenterology who recommended ceftriaxone, Protonix, octreotide and admission for possible EGD in the morning, patient is going to be admitted for melena most likely stemming from Sharath bleeding secondary to alcohol related chronic liver disease.      Past Medical History    No past medical history on file.    Past Surgical History   No past surgical history on file.    Prior to Admission Medications   None        Review of Systems    The 10 point Review of Systems is negative other than noted in the HPI or here.  Black tarry stools    Physical Exam   Vital Signs: Temp: 99.1  F (37.3  C) Temp src: Oral BP: (!) 151/92 Pulse: 98   Resp: 28 SpO2: 93 % O2 Device: None (Room air)    Weight: 251 lbs 1.66 oz    GENERAL: Patient was seen and examined at bedside with no acute distress, yellowing of skin  HENT:  Head is normocephalic, atraumatic.   EYES:  Eyes have anicteric sclerae  without conjunctival injection   LUNGS: Bilateral air entry, clear to auscultation bilaterally  CARDIOVASCULAR:  Regular rate and rhythm with no murmurs, gallops or rubs.  ABDOMEN:  Normal bowel sounds. Soft; non tender, significant abdominal distension  EXT significant: lower extremity edema   SKIN:  No acute rashes.   NEUROLOGIC:  Grossly nonfocal.      Medical Decision Making       80 MINUTES SPENT BY ME on the date of service doing chart review, history, exam, documentation & further activities per the note.      Data     I have personally reviewed the following data over the past 24 hrs:    7.9  \   13.3   / 213     135 98 7.9 (L) /  100 (H)   3.6 21 (L) 0.58 (L) \     ALT: 44 AST: 161 (H) AP: 190 (H) TBILI: 5.8 (H)   ALB: 3.3 (L) TOT PROTEIN: 7.5 LIPASE: N/A     TSH: N/A T4: N/A A1C: 5.6     INR:  1.44 (H) PTT:  N/A   D-dimer:  N/A Fibrinogen:  N/A       Imaging results reviewed over the past 24 hrs:   Recent Results (from the past 24 hours)   CTA GI Bleed    Narrative    EXAM: CTA GI BLEED  LOCATION: [facility]  DATE: 7/2/2025    INDICATION: Melena,GI evel protocol  COMPARISON: CT abdomen and pelvis 05/01/2000  TECHNIQUE: CT angiogram abdomen pelvis during arterial phase of injection of IV contrast. 2D and 3D MIP reconstructions were performed by the CT technologist. Dose reduction techniques were used.  CONTRAST: 90mL ISOVUE 370    FINDINGS:  ANGIOGRAM ABDOMEN/PELVIS: The abdominal aorta is negative for dissection or aneurysm. Mild atherosclerotic disease of the abdominal aorta. Plaque and mild narrowing at the origins of the celiac trunk and superior mesenteric artery. Both renal arteries   and the inferior mesenteric artery are patent without significant narrowing. Numerous portosystemic varices. No evidence for IV contrast extravasation into the GI tract.    LOWER CHEST: Coronary artery calcifications. Lower esophageal varices.    HEPATOBILIARY: Irregular liver contour compatible  with cirrhosis, with diffuse fatty infiltration and innumerable small hypodense lesions. Moderate amount of adjacent ascites.    PANCREAS: Normal.    SPLEEN: Splenomegaly measuring 15.3 cm. Calcifications along the lateral margin of the spleen. Moderate ascites left upper quadrant.    ADRENAL GLANDS: Normal.    KIDNEYS/BLADDER: Normal.    BOWEL: Wall thickening ascending colon. There is also wall thickening involving the transverse portion of the duodenum. No evidence for bowel obstruction. Gastric wall thickening could be secondary to incomplete distention. No evidence for acute   hemorrhage in the GI tract.    LYMPH NODES: A few enlarged gastrohepatic and althea hepatis nodes.    PELVIC ORGANS: Moderate amount of pelvic ascites.    MUSCULOSKELETAL: Tiny fat-containing and small bilateral fat-containing inguinal hernias. Edematous changes subcutaneous fat anterior abdominal wall. Hypertrophic changes thoracic spine.      Impression    IMPRESSION:  1.  No evidence for active GI bleed.  2.  Cirrhosis with portal venous hypertension, including moderate ascites, numerous portosystemic collaterals, and splenomegaly.  3.  Innumerable hypodense nodules throughout the liver, most likely representing regenerative nodules. MRI recommended for further evaluation.  4.  Wall thickening involving the ascending colon and proximal small bowel could be secondary to portal enterocolopathy secondary to underlying liver disease.  5.  Several enlarged gastrohepatic and althea hepatis lymph nodes, possibly reactive.

## 2025-07-03 ENCOUNTER — ANESTHESIA EVENT (OUTPATIENT)
Dept: SURGERY | Facility: HOSPITAL | Age: 75
End: 2025-07-03
Payer: COMMERCIAL

## 2025-07-03 ENCOUNTER — ANESTHESIA (OUTPATIENT)
Dept: SURGERY | Facility: HOSPITAL | Age: 75
End: 2025-07-03
Payer: COMMERCIAL

## 2025-07-03 ENCOUNTER — APPOINTMENT (OUTPATIENT)
Dept: ULTRASOUND IMAGING | Facility: HOSPITAL | Age: 75
End: 2025-07-03
Attending: STUDENT IN AN ORGANIZED HEALTH CARE EDUCATION/TRAINING PROGRAM
Payer: COMMERCIAL

## 2025-07-03 ENCOUNTER — APPOINTMENT (OUTPATIENT)
Dept: MRI IMAGING | Facility: HOSPITAL | Age: 75
End: 2025-07-03
Attending: STUDENT IN AN ORGANIZED HEALTH CARE EDUCATION/TRAINING PROGRAM
Payer: COMMERCIAL

## 2025-07-03 ENCOUNTER — APPOINTMENT (OUTPATIENT)
Dept: CARDIOLOGY | Facility: HOSPITAL | Age: 75
End: 2025-07-03
Attending: HOSPITALIST
Payer: COMMERCIAL

## 2025-07-03 VITALS
HEIGHT: 69 IN | HEART RATE: 77 BPM | RESPIRATION RATE: 18 BRPM | TEMPERATURE: 98.8 F | SYSTOLIC BLOOD PRESSURE: 114 MMHG | WEIGHT: 251.1 LBS | BODY MASS INDEX: 37.19 KG/M2 | OXYGEN SATURATION: 95 % | DIASTOLIC BLOOD PRESSURE: 71 MMHG

## 2025-07-03 LAB
% LINING CELLS, BODY FLUID: 1 %
ALBUMIN BODY FLUID SOURCE: NORMAL
ALBUMIN FLD-MCNC: 0.7 G/DL
ALBUMIN SERPL BCG-MCNC: 3.3 G/DL (ref 3.5–5.2)
ALP SERPL-CCNC: 133 U/L (ref 40–150)
ALT SERPL W P-5'-P-CCNC: 33 U/L (ref 0–70)
ANION GAP SERPL CALCULATED.3IONS-SCNC: 13 MMOL/L (ref 7–15)
APPEARANCE FLD: CLEAR
AST SERPL W P-5'-P-CCNC: 120 U/L (ref 0–45)
BILIRUB DIRECT SERPL-MCNC: 3.34 MG/DL (ref 0–0.3)
BILIRUB SERPL-MCNC: 5.3 MG/DL
BUN SERPL-MCNC: 11 MG/DL (ref 8–23)
C CAYETANENSIS DNA STL QL NAA+NON-PROBE: NEGATIVE
CALCIUM SERPL-MCNC: 8.5 MG/DL (ref 8.8–10.4)
CAMPYLOBACTER DNA SPEC NAA+PROBE: NEGATIVE
CHLORIDE SERPL-SCNC: 99 MMOL/L (ref 98–107)
COLOR FLD: YELLOW
CREAT SERPL-MCNC: 0.63 MG/DL (ref 0.67–1.17)
CRYPTOSP DNA STL QL NAA+NON-PROBE: NEGATIVE
EC STX1+STX2 GENES STL QL NAA+NON-PROBE: NEGATIVE
EGFRCR SERPLBLD CKD-EPI 2021: >90 ML/MIN/1.73M2
ERYTHROCYTE [DISTWIDTH] IN BLOOD BY AUTOMATED COUNT: 14.6 % (ref 10–15)
FOLATE SERPL-MCNC: 2.9 NG/ML (ref 4.6–34.8)
G LAMBLIA DNA STL QL NAA+NON-PROBE: NEGATIVE
GLUCOSE BLDC GLUCOMTR-MCNC: 137 MG/DL (ref 70–99)
GLUCOSE BLDC GLUCOMTR-MCNC: 145 MG/DL (ref 70–99)
GLUCOSE BLDC GLUCOMTR-MCNC: 193 MG/DL (ref 70–99)
GLUCOSE SERPL-MCNC: 153 MG/DL (ref 70–99)
GRAM STAIN RESULT: NORMAL
GRAM STAIN RESULT: NORMAL
HCO3 SERPL-SCNC: 23 MMOL/L (ref 22–29)
HCT VFR BLD AUTO: 31.6 % (ref 40–53)
HGB BLD-MCNC: 10.8 G/DL (ref 13.3–17.7)
INR PPP: 1.68 (ref 0.85–1.15)
LVEF ECHO: NORMAL
LYMPHOCYTES NFR FLD MANUAL: 12 %
MAGNESIUM SERPL-MCNC: 2 MG/DL (ref 1.7–2.3)
MCH RBC QN AUTO: 34.6 PG (ref 26.5–33)
MCHC RBC AUTO-ENTMCNC: 34.2 G/DL (ref 31.5–36.5)
MCV RBC AUTO: 101 FL (ref 78–100)
MONOS+MACROS NFR FLD MANUAL: 87 %
NEUTROPHILS # FLD: 0 /UL (ref ?–250)
NEUTS BAND NFR FLD MANUAL: 0 %
NOROVIRUS GI+II RNA STL QL NAA+NON-PROBE: NEGATIVE
PATH REV: NORMAL
PHOSPHATE SERPL-MCNC: 3.1 MG/DL (ref 2.5–4.5)
PLATELET # BLD AUTO: 144 10E3/UL (ref 150–450)
POTASSIUM SERPL-SCNC: 3.8 MMOL/L (ref 3.4–5.3)
PROT FLD-MCNC: 1.3 G/DL
PROT SERPL-MCNC: 6.4 G/DL (ref 6.4–8.3)
PROTEIN BODY FLUID SOURCE: NORMAL
PROTHROMBIN TIME: 20 SECONDS (ref 11.8–14.8)
RBC # BLD AUTO: 3.12 10E6/UL (ref 4.4–5.9)
SALMONELLA SP RPOD STL QL NAA+PROBE: NEGATIVE
SHIGELLA SP+EIEC IPAH ST NAA+NON-PROBE: NEGATIVE
SODIUM SERPL-SCNC: 135 MMOL/L (ref 135–145)
SPECIMEN SOURCE FLD: NORMAL
UPPER GI ENDOSCOPY: NORMAL
VIBRIO DNA SPEC NAA+PROBE: NEGATIVE
VIT B12 SERPL-MCNC: 745 PG/ML (ref 232–1245)
WBC # BLD AUTO: 6.4 10E3/UL (ref 4–11)
WBC # FLD AUTO: 611 /UL
Y ENTEROCOL DNA STL QL NAA+PROBE: NEGATIVE

## 2025-07-03 PROCEDURE — 250N000012 HC RX MED GY IP 250 OP 636 PS 637: Mod: JA | Performed by: EMERGENCY MEDICINE

## 2025-07-03 PROCEDURE — A9585 GADOBUTROL INJECTION: HCPCS | Performed by: STUDENT IN AN ORGANIZED HEALTH CARE EDUCATION/TRAINING PROGRAM

## 2025-07-03 PROCEDURE — 83735 ASSAY OF MAGNESIUM: CPT | Performed by: STUDENT IN AN ORGANIZED HEALTH CARE EDUCATION/TRAINING PROGRAM

## 2025-07-03 PROCEDURE — 120N000001 HC R&B MED SURG/OB

## 2025-07-03 PROCEDURE — 250N000013 HC RX MED GY IP 250 OP 250 PS 637: Performed by: STUDENT IN AN ORGANIZED HEALTH CARE EDUCATION/TRAINING PROGRAM

## 2025-07-03 PROCEDURE — 82746 ASSAY OF FOLIC ACID SERUM: CPT | Performed by: STUDENT IN AN ORGANIZED HEALTH CARE EDUCATION/TRAINING PROGRAM

## 2025-07-03 PROCEDURE — 999N000141 HC STATISTIC PRE-PROCEDURE NURSING ASSESSMENT: Performed by: INTERNAL MEDICINE

## 2025-07-03 PROCEDURE — 84157 ASSAY OF PROTEIN OTHER: CPT | Performed by: STUDENT IN AN ORGANIZED HEALTH CARE EDUCATION/TRAINING PROGRAM

## 2025-07-03 PROCEDURE — 258N000003 HC RX IP 258 OP 636: Performed by: EMERGENCY MEDICINE

## 2025-07-03 PROCEDURE — 82248 BILIRUBIN DIRECT: CPT | Performed by: STUDENT IN AN ORGANIZED HEALTH CARE EDUCATION/TRAINING PROGRAM

## 2025-07-03 PROCEDURE — 85610 PROTHROMBIN TIME: CPT | Performed by: STUDENT IN AN ORGANIZED HEALTH CARE EDUCATION/TRAINING PROGRAM

## 2025-07-03 PROCEDURE — 84295 ASSAY OF SERUM SODIUM: CPT | Performed by: STUDENT IN AN ORGANIZED HEALTH CARE EDUCATION/TRAINING PROGRAM

## 2025-07-03 PROCEDURE — 93306 TTE W/DOPPLER COMPLETE: CPT | Mod: 26 | Performed by: INTERNAL MEDICINE

## 2025-07-03 PROCEDURE — 250N000011 HC RX IP 250 OP 636: Performed by: NURSE ANESTHETIST, CERTIFIED REGISTERED

## 2025-07-03 PROCEDURE — 360N000075 HC SURGERY LEVEL 2, PER MIN: Performed by: INTERNAL MEDICINE

## 2025-07-03 PROCEDURE — 250N000013 HC RX MED GY IP 250 OP 250 PS 637: Performed by: HOSPITALIST

## 2025-07-03 PROCEDURE — 250N000009 HC RX 250: Performed by: NURSE ANESTHETIST, CERTIFIED REGISTERED

## 2025-07-03 PROCEDURE — 255N000002 HC RX 255 OP 636: Performed by: STUDENT IN AN ORGANIZED HEALTH CARE EDUCATION/TRAINING PROGRAM

## 2025-07-03 PROCEDURE — 84100 ASSAY OF PHOSPHORUS: CPT | Performed by: STUDENT IN AN ORGANIZED HEALTH CARE EDUCATION/TRAINING PROGRAM

## 2025-07-03 PROCEDURE — 82310 ASSAY OF CALCIUM: CPT | Performed by: STUDENT IN AN ORGANIZED HEALTH CARE EDUCATION/TRAINING PROGRAM

## 2025-07-03 PROCEDURE — 36415 COLL VENOUS BLD VENIPUNCTURE: CPT | Performed by: STUDENT IN AN ORGANIZED HEALTH CARE EDUCATION/TRAINING PROGRAM

## 2025-07-03 PROCEDURE — 74183 MRI ABD W/O CNTR FLWD CNTR: CPT

## 2025-07-03 PROCEDURE — 272N000001 HC OR GENERAL SUPPLY STERILE: Performed by: INTERNAL MEDICINE

## 2025-07-03 PROCEDURE — 99232 SBSQ HOSP IP/OBS MODERATE 35: CPT | Performed by: HOSPITALIST

## 2025-07-03 PROCEDURE — 85014 HEMATOCRIT: CPT | Performed by: STUDENT IN AN ORGANIZED HEALTH CARE EDUCATION/TRAINING PROGRAM

## 2025-07-03 PROCEDURE — 250N000011 HC RX IP 250 OP 636: Performed by: STUDENT IN AN ORGANIZED HEALTH CARE EDUCATION/TRAINING PROGRAM

## 2025-07-03 PROCEDURE — 272N000710 US PARACENTESIS WITH ALBUMIN

## 2025-07-03 PROCEDURE — 82042 OTHER SOURCE ALBUMIN QUAN EA: CPT | Performed by: STUDENT IN AN ORGANIZED HEALTH CARE EDUCATION/TRAINING PROGRAM

## 2025-07-03 PROCEDURE — 370N000017 HC ANESTHESIA TECHNICAL FEE, PER MIN: Performed by: INTERNAL MEDICINE

## 2025-07-03 PROCEDURE — 250N000011 HC RX IP 250 OP 636: Performed by: HOSPITALIST

## 2025-07-03 PROCEDURE — 87205 SMEAR GRAM STAIN: CPT | Performed by: STUDENT IN AN ORGANIZED HEALTH CARE EDUCATION/TRAINING PROGRAM

## 2025-07-03 PROCEDURE — 258N000003 HC RX IP 258 OP 636: Performed by: STUDENT IN AN ORGANIZED HEALTH CARE EDUCATION/TRAINING PROGRAM

## 2025-07-03 PROCEDURE — 06L38CZ OCCLUSION OF ESOPHAGEAL VEIN WITH EXTRALUMINAL DEVICE, VIA NATURAL OR ARTIFICIAL OPENING ENDOSCOPIC: ICD-10-PCS | Performed by: INTERNAL MEDICINE

## 2025-07-03 PROCEDURE — 999N000208 ECHOCARDIOGRAM COMPLETE

## 2025-07-03 PROCEDURE — 255N000002 HC RX 255 OP 636: Performed by: HOSPITALIST

## 2025-07-03 PROCEDURE — 87070 CULTURE OTHR SPECIMN AEROBIC: CPT | Performed by: STUDENT IN AN ORGANIZED HEALTH CARE EDUCATION/TRAINING PROGRAM

## 2025-07-03 PROCEDURE — 89050 BODY FLUID CELL COUNT: CPT | Performed by: STUDENT IN AN ORGANIZED HEALTH CARE EDUCATION/TRAINING PROGRAM

## 2025-07-03 RX ORDER — CLONIDINE HYDROCHLORIDE 0.1 MG/1
0.1 TABLET ORAL EVERY 8 HOURS
Status: DISPENSED | OUTPATIENT
Start: 2025-07-03

## 2025-07-03 RX ORDER — ONDANSETRON 4 MG/1
4 TABLET, ORALLY DISINTEGRATING ORAL EVERY 30 MIN PRN
Status: DISCONTINUED | OUTPATIENT
Start: 2025-07-03 | End: 2025-07-03 | Stop reason: HOSPADM

## 2025-07-03 RX ORDER — SODIUM CHLORIDE, SODIUM LACTATE, POTASSIUM CHLORIDE, CALCIUM CHLORIDE 600; 310; 30; 20 MG/100ML; MG/100ML; MG/100ML; MG/100ML
INJECTION, SOLUTION INTRAVENOUS CONTINUOUS
Status: DISCONTINUED | OUTPATIENT
Start: 2025-07-03 | End: 2025-07-03 | Stop reason: HOSPADM

## 2025-07-03 RX ORDER — DIAZEPAM 10 MG/1
10 TABLET ORAL EVERY 30 MIN PRN
Status: ACTIVE | OUTPATIENT
Start: 2025-07-03

## 2025-07-03 RX ORDER — HALOPERIDOL 5 MG/ML
2.5-5 INJECTION INTRAMUSCULAR EVERY 6 HOURS PRN
Status: ACTIVE | OUTPATIENT
Start: 2025-07-03

## 2025-07-03 RX ORDER — OXYCODONE HYDROCHLORIDE 5 MG/1
10 TABLET ORAL
Status: DISCONTINUED | OUTPATIENT
Start: 2025-07-03 | End: 2025-07-03 | Stop reason: HOSPADM

## 2025-07-03 RX ORDER — NALOXONE HYDROCHLORIDE 0.4 MG/ML
0.4 INJECTION, SOLUTION INTRAMUSCULAR; INTRAVENOUS; SUBCUTANEOUS
Status: ACTIVE | OUTPATIENT
Start: 2025-07-03

## 2025-07-03 RX ORDER — GABAPENTIN 300 MG/1
1200 CAPSULE ORAL ONCE
Status: COMPLETED | OUTPATIENT
Start: 2025-07-03 | End: 2025-07-03

## 2025-07-03 RX ORDER — DEXAMETHASONE SODIUM PHOSPHATE 4 MG/ML
4 INJECTION, SOLUTION INTRA-ARTICULAR; INTRALESIONAL; INTRAMUSCULAR; INTRAVENOUS; SOFT TISSUE
Status: DISCONTINUED | OUTPATIENT
Start: 2025-07-03 | End: 2025-07-03 | Stop reason: HOSPADM

## 2025-07-03 RX ORDER — NALOXONE HYDROCHLORIDE 1 MG/ML
0.1 INJECTION INTRAMUSCULAR; INTRAVENOUS; SUBCUTANEOUS
Status: DISCONTINUED | OUTPATIENT
Start: 2025-07-03 | End: 2025-07-03 | Stop reason: HOSPADM

## 2025-07-03 RX ORDER — MULTIPLE VITAMINS W/ MINERALS TAB 9MG-400MCG
1 TAB ORAL DAILY
Status: DISPENSED | OUTPATIENT
Start: 2025-07-03

## 2025-07-03 RX ORDER — GABAPENTIN 300 MG/1
900 CAPSULE ORAL EVERY 8 HOURS
Status: DISPENSED | OUTPATIENT
Start: 2025-07-03 | End: 2025-07-06

## 2025-07-03 RX ORDER — LIDOCAINE HYDROCHLORIDE 10 MG/ML
INJECTION, SOLUTION INFILTRATION; PERINEURAL PRN
Status: DISCONTINUED | OUTPATIENT
Start: 2025-07-03 | End: 2025-07-03

## 2025-07-03 RX ORDER — NALOXONE HYDROCHLORIDE 0.4 MG/ML
0.2 INJECTION, SOLUTION INTRAMUSCULAR; INTRAVENOUS; SUBCUTANEOUS
Status: ACTIVE | OUTPATIENT
Start: 2025-07-03

## 2025-07-03 RX ORDER — GADOBUTROL 604.72 MG/ML
11.5 INJECTION INTRAVENOUS ONCE
Status: COMPLETED | OUTPATIENT
Start: 2025-07-03 | End: 2025-07-03

## 2025-07-03 RX ORDER — OXYCODONE HYDROCHLORIDE 5 MG/1
5 TABLET ORAL
Status: DISCONTINUED | OUTPATIENT
Start: 2025-07-03 | End: 2025-07-03 | Stop reason: HOSPADM

## 2025-07-03 RX ORDER — FLUMAZENIL 0.1 MG/ML
0.2 INJECTION, SOLUTION INTRAVENOUS
Status: ACTIVE | OUTPATIENT
Start: 2025-07-03

## 2025-07-03 RX ORDER — DIAZEPAM 10 MG/2ML
5-10 INJECTION, SOLUTION INTRAMUSCULAR; INTRAVENOUS EVERY 30 MIN PRN
Status: DISPENSED | OUTPATIENT
Start: 2025-07-03

## 2025-07-03 RX ORDER — FOLIC ACID 5 MG/ML
1 INJECTION, SOLUTION INTRAMUSCULAR; INTRAVENOUS; SUBCUTANEOUS DAILY
Status: DISPENSED | OUTPATIENT
Start: 2025-07-03

## 2025-07-03 RX ORDER — ONDANSETRON 2 MG/ML
4 INJECTION INTRAMUSCULAR; INTRAVENOUS EVERY 30 MIN PRN
Status: DISCONTINUED | OUTPATIENT
Start: 2025-07-03 | End: 2025-07-03 | Stop reason: HOSPADM

## 2025-07-03 RX ORDER — FLUMAZENIL 0.1 MG/ML
0.2 INJECTION, SOLUTION INTRAVENOUS
Status: ACTIVE | OUTPATIENT
Start: 2025-07-03 | End: 2025-07-04

## 2025-07-03 RX ORDER — GABAPENTIN 300 MG/1
300 CAPSULE ORAL EVERY 8 HOURS
Status: ACTIVE | OUTPATIENT
Start: 2025-07-08 | End: 2025-07-10

## 2025-07-03 RX ORDER — GABAPENTIN 300 MG/1
600 CAPSULE ORAL EVERY 8 HOURS
Status: ACTIVE | OUTPATIENT
Start: 2025-07-06 | End: 2025-07-08

## 2025-07-03 RX ORDER — GABAPENTIN 100 MG/1
100 CAPSULE ORAL EVERY 8 HOURS
Status: ACTIVE | OUTPATIENT
Start: 2025-07-10 | End: 2025-07-13

## 2025-07-03 RX ORDER — PROPOFOL 10 MG/ML
INJECTION, EMULSION INTRAVENOUS PRN
Status: DISCONTINUED | OUTPATIENT
Start: 2025-07-03 | End: 2025-07-03

## 2025-07-03 RX ORDER — LIDOCAINE 40 MG/G
CREAM TOPICAL
Status: DISCONTINUED | OUTPATIENT
Start: 2025-07-03 | End: 2025-07-03 | Stop reason: HOSPADM

## 2025-07-03 RX ORDER — OLANZAPINE 5 MG/1
5-10 TABLET, ORALLY DISINTEGRATING ORAL EVERY 6 HOURS PRN
Status: ACTIVE | OUTPATIENT
Start: 2025-07-03

## 2025-07-03 RX ORDER — PROPOFOL 10 MG/ML
INJECTION, EMULSION INTRAVENOUS CONTINUOUS PRN
Status: DISCONTINUED | OUTPATIENT
Start: 2025-07-03 | End: 2025-07-03

## 2025-07-03 RX ADMIN — GABAPENTIN 900 MG: 300 CAPSULE ORAL at 08:31

## 2025-07-03 RX ADMIN — LIDOCAINE HYDROCHLORIDE 10 ML: 10 INJECTION, SOLUTION INFILTRATION; PERINEURAL at 11:22

## 2025-07-03 RX ADMIN — CLONIDINE HYDROCHLORIDE 0.1 MG: 0.1 TABLET ORAL at 02:23

## 2025-07-03 RX ADMIN — PANTOPRAZOLE SODIUM 40 MG: 40 INJECTION, POWDER, FOR SOLUTION INTRAVENOUS at 05:43

## 2025-07-03 RX ADMIN — CLONIDINE HYDROCHLORIDE 0.1 MG: 0.1 TABLET ORAL at 08:31

## 2025-07-03 RX ADMIN — GABAPENTIN 1200 MG: 300 CAPSULE ORAL at 02:03

## 2025-07-03 RX ADMIN — OCTREOTIDE ACETATE 50 MCG/HR: 500 INJECTION, SOLUTION INTRAVENOUS; SUBCUTANEOUS at 19:05

## 2025-07-03 RX ADMIN — SODIUM CHLORIDE: 0.9 INJECTION, SOLUTION INTRAVENOUS at 00:00

## 2025-07-03 RX ADMIN — THIAMINE HCL TAB 100 MG 100 MG: 100 TAB at 08:31

## 2025-07-03 RX ADMIN — PROPOFOL 150 MCG/KG/MIN: 10 INJECTION, EMULSION INTRAVENOUS at 11:22

## 2025-07-03 RX ADMIN — PROPOFOL 30 MG: 10 INJECTION, EMULSION INTRAVENOUS at 11:22

## 2025-07-03 RX ADMIN — Medication 1 TABLET: at 08:30

## 2025-07-03 RX ADMIN — FOLIC ACID 1 MG: 5 INJECTION, SOLUTION INTRAMUSCULAR; INTRAVENOUS; SUBCUTANEOUS at 08:32

## 2025-07-03 RX ADMIN — CALCIUM CARBONATE (ANTACID) CHEW TAB 500 MG 1000 MG: 500 CHEW TAB at 17:27

## 2025-07-03 RX ADMIN — CLONIDINE HYDROCHLORIDE 0.1 MG: 0.1 TABLET ORAL at 17:28

## 2025-07-03 RX ADMIN — DIAZEPAM 5 MG: 10 INJECTION, SOLUTION INTRAMUSCULAR; INTRAVENOUS at 05:44

## 2025-07-03 RX ADMIN — CEFTRIAXONE SODIUM 2 G: 2 INJECTION, POWDER, FOR SOLUTION INTRAMUSCULAR; INTRAVENOUS at 20:05

## 2025-07-03 RX ADMIN — DIAZEPAM 5 MG: 10 INJECTION, SOLUTION INTRAMUSCULAR; INTRAVENOUS at 02:02

## 2025-07-03 RX ADMIN — GADOBUTROL 11.5 ML: 604.72 INJECTION INTRAVENOUS at 18:27

## 2025-07-03 RX ADMIN — SODIUM CHLORIDE: 0.9 INJECTION, SOLUTION INTRAVENOUS at 13:10

## 2025-07-03 RX ADMIN — GABAPENTIN 900 MG: 300 CAPSULE ORAL at 17:28

## 2025-07-03 RX ADMIN — PANTOPRAZOLE SODIUM 40 MG: 40 INJECTION, POWDER, FOR SOLUTION INTRAVENOUS at 17:28

## 2025-07-03 RX ADMIN — PERFLUTREN 2.5 ML (DILUTED): 6.52 INJECTION, SUSPENSION INTRAVENOUS at 16:34

## 2025-07-03 ASSESSMENT — ACTIVITIES OF DAILY LIVING (ADL)
ADLS_ACUITY_SCORE: 38
ADLS_ACUITY_SCORE: 35
ADLS_ACUITY_SCORE: 35
ADLS_ACUITY_SCORE: 38
ADLS_ACUITY_SCORE: 39
ADLS_ACUITY_SCORE: 39
ADLS_ACUITY_SCORE: 34
ADLS_ACUITY_SCORE: 38
ADLS_ACUITY_SCORE: 28
ADLS_ACUITY_SCORE: 35
ADLS_ACUITY_SCORE: 34
ADLS_ACUITY_SCORE: 38
ADLS_ACUITY_SCORE: 35
ADLS_ACUITY_SCORE: 38
ADLS_ACUITY_SCORE: 38
ADLS_ACUITY_SCORE: 35
ADLS_ACUITY_SCORE: 38
ADLS_ACUITY_SCORE: 38
ADLS_ACUITY_SCORE: 34
ADLS_ACUITY_SCORE: 38
ADLS_ACUITY_SCORE: 38

## 2025-07-03 NOTE — PLAN OF CARE
Problem: Adult Inpatient Plan of Care  Goal: Optimal Comfort and Wellbeing  Outcome: Progressing     Problem: Infection  Goal: Absence of Infection Signs and Symptoms  Outcome: Progressing     Problem: Nausea and Vomiting  Goal: Nausea and Vomiting Relief  Outcome: Progressing       Goal Outcome Evaluation:    A/ox3. Independent. Has multiple IV infusing, needs SBA. Patient makes needs known. Temp 99.1, on ceftriaxone. LS clear, dim. No cough. C/o discomfort from distended abdomen. BS active x4. Passing gas. No bm noted since admission, patient states he will need to go soon. Denies pain at this time. Clear liquids, NPO after midnight for endoscopy and paracentesis tomorrow. CIWA score 0. If having withdrawal, Dr. Gondal would like to be notified. PIV to RFA x3, infusing. Will continue to monitor.    Lola Yo RN

## 2025-07-03 NOTE — ED NOTES
"[facility] ED Handoff Report    ED Chief Complaint: Dark, tarry stool.  Bloating    ED Diagnosis:  (K92.2) Upper GI bleed      (R18.8) Other ascites       PMH:  No past medical history on file.     Code Status:  No Order     Falls Risk: No Band: Not applicable    Current Living Situation/Residence: lives with a significant other and lives in a house     Elimination Status: Continent: Yes     Activity Level: Independent    Patients Preferred Language:  English     Needed: No    Vital Signs:  /74   Pulse 97   Temp 99.6  F (37.6  C) (Temporal)   Resp 28   Ht 1.753 m (5' 9\")   Wt 115.7 kg (255 lb)   SpO2 94%   BMI 37.66 kg/m       Cardiac Rhythm: Sinus rhythm    Pain Score: 0/10    Is the Patient Confused:  No    Last Food or Drink: 07/02/25 at [employer] for breakfast    Focused Assessment:  A&Ox4.  Jaundice sclera.  Severe edema in legs.  ABD distended.  Tolerating room air.  Ambulates well independently.  Dark, tarry stool.    Tests Performed: Done: Labs and Imaging    Treatments Provided:  Protonix.  Octreotide.    Family Dynamics/Concerns: No    Family Updated On Visitor Policy: No    Plan of Care Communicated to Family: No    Belongings Checklist Done and Signed by Patient: Yes    Medications sent with patient: N/A    RN: Estella Ramírez RN 7/2/2025 7:25 PM      "

## 2025-07-03 NOTE — ANESTHESIA POSTPROCEDURE EVALUATION
Patient: Malocm Chowdhury    Procedure: Procedure(s):  ESOPHAGOGASTRODUODENOSCOPY WITH ESOPHAGEAL VARICES BANDING       Anesthesia Type:  MAC    Note:  Disposition: Outpatient   Postop Pain Control: Uneventful            Sign Out: Well controlled pain   PONV: No   Neuro/Psych: Uneventful            Sign Out: Acceptable/Baseline neuro status   Airway/Respiratory: Uneventful            Sign Out: Acceptable/Baseline resp. status   CV/Hemodynamics: Uneventful            Sign Out: Acceptable CV status; No obvious hypovolemia; No obvious fluid overload   Other NRE: NONE   DID A NON-ROUTINE EVENT OCCUR? No           Last vitals:  Vitals:    07/03/25 1233 07/03/25 1249 07/03/25 1340   BP: 137/84 123/71 126/84   Pulse: 82 80 84   Resp: 16 18 18   Temp: 36.8  C (98.3  F) 36.8  C (98.3  F) 37  C (98.6  F)   SpO2: 95% 93% 92%       Electronically Signed By: Jet Ibrahim MD  July 3, 2025  1:50 PM

## 2025-07-03 NOTE — PHARMACY-ADMISSION MEDICATION HISTORY
Pharmacy Intern Admission Medication History    Admission medication history is complete. The information provided in this note is only as accurate as the sources available at the time of the update.    Information Source(s): Patient and CareEverywhere/SureScripts via in-person    Pertinent Information:     Changes made to PTA medication list:  Added: None  Deleted: None  Changed: None    Allergies reviewed with patient and updates made in EHR: yes    Medication History Completed By: MICHEL AZUL 7/2/2025 7:18 PM    No outpatient medications have been marked as taking for the 7/2/25 encounter (Hospital Encounter).

## 2025-07-03 NOTE — PRE-PROCEDURE
GENERAL PRE-PROCEDURE:   Procedure:  Esophagogastroduodenoscopy  Date/Time:  7/3/2025 10:07 AM    Verbal consent obtained?: Yes    Written consent obtained?: Yes    Risks and benefits: Risks, benefits and alternatives were discussed    Consent given by:  Patient  Patient states understanding of procedure being performed: Yes    Patient's understanding of procedure matches consent: Yes    Procedure consent matches procedure scheduled: Yes    Expected level of sedation:  Moderate  Appropriately NPO:  Yes  ASA Class:  3  Mallampati  :  Grade 3- soft palate visible, posterior pharyngeal wall not visible  Lungs:  Lungs clear with good breath sounds bilaterally  Heart:  Normal heart sounds and rate  History & Physical reviewed:  History and physical reviewed and no updates needed  Statement of review:  I have reviewed the lab findings, diagnostic data, medications, and the plan for sedation

## 2025-07-03 NOTE — ANESTHESIA CARE TRANSFER NOTE
Patient: Malcom Chowdhury    Procedure: Procedure(s):  ESOPHAGOGASTRODUODENOSCOPY WITH ESOPHAGEAL VARICES BANDING       Diagnosis: Upper GI bleed [K92.2]  Diagnosis Additional Information: No value filed.    Anesthesia Type:   MAC     Note:    Oropharynx: oropharynx clear of all foreign objects and spontaneously breathing  Level of Consciousness: awake  Oxygen Supplementation: room air    Independent Airway: airway patency satisfactory and stable  Dentition: dentition unchanged  Vital Signs Stable: post-procedure vital signs reviewed and stable  Report to RN Given: handoff report given  Patient transferred to: Medical/Surgical Unit    Handoff Report: Identifed the Patient, Identified the Reponsible Provider, Reviewed the pertinent medical history, Discussed the surgical course, Reviewed Intra-OP anesthesia mangement and issues during anesthesia, Set expectations for post-procedure period and Allowed opportunity for questions and acknowledgement of understanding      Vitals:  Vitals Value Taken Time   BP     Temp     Pulse     Resp     SpO2         Electronically Signed By: CASS Cruz CRNA  July 3, 2025  11:52 AM

## 2025-07-03 NOTE — CONSULTS
"                                     CONSULTING PHYSICIAN   Dulce Prieto MD     REASON FOR CONSULTATION   Melena, ascites     CHIEF COMPLAINT   Malcom Chowdhury came to the hospital for evaluation of melena     HISTORY OF PRESENT ILLNESS   Malcom Chowdhury is a 75 year old male admitted with melena for 2 days and abdominal distention for 3 weeks.    Patient notes multiple loose black bowel movements for the last 2 days.  Denies any hematemesis.  He denies any bright red blood per rectum.  He has never had an episode of GI bleeding in the past.  He has had previous colonoscopies but has never had an upper endoscopy.  Patient also notes increasing abdominal distention over the past 3 weeks in addition to lower extremity edema.  Patient notes a long history of alcohol abuse.  States he is never attempted to quit in the past though has quit over the past 2 weeks.  He denies any legal entanglements.  I do see clinic notes from 2016 where he met with a psychologist and was looking to quit alcohol.    On admission patient noted to have normal hemoglobin with MCV of 101.     PAST HISTORY   No past medical history on file.   No past surgical history on file.     Family History Social History   No family history on file.         MEDICATIONS & ALLERGIES   No medications prior to admission.        ALLERGIES   Allergies   Allergen Reactions    Penicillins Unknown    Sulfa Antibiotics Unknown         REVIEW OF SYSTEMS   A comprehensive review of systems was performed and was otherwise noncontributory.     OBJECTIVE   Vitals Blood pressure 124/72, pulse 96, temperature 98.6  F (37  C), temperature source Oral, resp. rate 18, height 1.753 m (5' 9.02\"), weight 113.9 kg (251 lb 1.7 oz), SpO2 96%.           Physical  Exam  GENERAL: alert and oriented, well nourished in no apparent distress   SKIN: warm and dry, no rashes   HEENT: Lateral icterus, moist mucous membranes, neck soft/supple    PULMONARY: normal resp effort, breath sounds " clear to auscultation bilateral   CARDIOVASCULAR: normal rate and rhythm, no murmurs, 3+ edema   ABDOMEN: Large amount of distention, bowel sounds normal   MUSCULOSKELETAL: joints and gait normal   NEUROLOGICAL: appropriate mental status, grossly intact  DERM: + jaundice   PSYCHIATRIC: normal mood, affect and insight        LABORATORY    ELECTROLYTE PANEL   Recent Labs   Lab 07/03/25  0635 07/02/25  2126 07/02/25  1608     --  135   POTASSIUM 3.8  --  3.6   CHLORIDE 99  --  98   CO2 23  --  21*   * 100* 141*   CR 0.63*  --  0.58*   BUN 11.0  --  7.9*      HEMATOLOGY PANEL   Recent Labs   Lab 07/03/25  0635 07/02/25  1608   HGB 10.8* 13.3   * 101*   WBC 6.4 7.9   * 213   INR 1.68* 1.44*      LIVER AND PANCREAS PANEL   Recent Labs   Lab 07/03/25  0635 07/02/25  1608   * 161*   ALT 33 44   ALKPHOS 133 190*   BILITOTAL 5.3* 5.8*     IMAGING STUDIES    EXAM: CTA GI BLEED  LOCATION: [facility]  DATE: 7/2/2025     INDICATION: Melena,GI evel protocol  COMPARISON: CT abdomen and pelvis 05/01/2000  TECHNIQUE: CT angiogram abdomen pelvis during arterial phase of injection of IV contrast. 2D and 3D MIP reconstructions were performed by the CT technologist. Dose reduction techniques were used.  CONTRAST: 90mL ISOVUE 370     FINDINGS:  ANGIOGRAM ABDOMEN/PELVIS: The abdominal aorta is negative for dissection or aneurysm. Mild atherosclerotic disease of the abdominal aorta. Plaque and mild narrowing at the origins of the celiac trunk and superior mesenteric artery. Both renal arteries   and the inferior mesenteric artery are patent without significant narrowing. Numerous portosystemic varices. No evidence for IV contrast extravasation into the GI tract.     LOWER CHEST: Coronary artery calcifications. Lower esophageal varices.     HEPATOBILIARY: Irregular liver contour compatible with cirrhosis, with diffuse fatty infiltration and innumerable small hypodense lesions.  Moderate amount of adjacent ascites.     PANCREAS: Normal.     SPLEEN: Splenomegaly measuring 15.3 cm. Calcifications along the lateral margin of the spleen. Moderate ascites left upper quadrant.     ADRENAL GLANDS: Normal.     KIDNEYS/BLADDER: Normal.     BOWEL: Wall thickening ascending colon. There is also wall thickening involving the transverse portion of the duodenum. No evidence for bowel obstruction. Gastric wall thickening could be secondary to incomplete distention. No evidence for acute   hemorrhage in the GI tract.     LYMPH NODES: A few enlarged gastrohepatic and althea hepatis nodes.     PELVIC ORGANS: Moderate amount of pelvic ascites.     MUSCULOSKELETAL: Tiny fat-containing and small bilateral fat-containing inguinal hernias. Edematous changes subcutaneous fat anterior abdominal wall. Hypertrophic changes thoracic spine.                                                                      IMPRESSION:  1.  No evidence for active GI bleed.  2.  Cirrhosis with portal venous hypertension, including moderate ascites, numerous portosystemic collaterals, and splenomegaly.  3.  Innumerable hypodense nodules throughout the liver, most likely representing regenerative nodules. MRI recommended for further evaluation.  4.  Wall thickening involving the ascending colon and proximal small bowel could be secondary to portal enterocolopathy secondary to underlying liver disease.  5.  Several enlarged gastrohepatic and althea hepatis lymph nodes, possibly reactive.     I have reviewed the current diagnostic and laboratory tests.           IMPRESSION   Malcom Chowdhury is a 75 year old male with new diagnosis of cirrhosis with ascites and portal hypertension on imaging with melena    Melena-concerning for upper GI bleed related to variceal hemorrhage.  Peptic ulcer disease or AVM is in the differential  Continue octreotide  Continue PPI  Continue ceftriaxone  EGD today    Cirrhosis-new diagnosis, decompensated.  Suspect  that this is alcohol misuse disorder.  Will send serologies to complete evaluation  - not a candidate for steroids given active GI bleeding  - He will need 6 month sobriety for his transplant eligible given a past history of seeking medical care for his alcohol misuse    Ascites-plan paracentesis for diagnostic assessment and therapeutic management             Alex Kaplan MD  Thank you for the opportunity to participate in the care of this patient.   Please feel free to call me with any questions or concerns.  Phone number [phone].

## 2025-07-03 NOTE — ANESTHESIA PREPROCEDURE EVALUATION
Anesthesia Pre-Procedure Evaluation    Patient: Malcom Chowdhury   MRN: [id number] : 1950          Procedure : Procedure(s):  ESOPHAGOGASTRODUODENOSCOPY         No past medical history on file.   History reviewed. No pertinent surgical history.   Allergies   Allergen Reactions    Penicillins Unknown    Sulfa Antibiotics Unknown      Social History     Tobacco Use    Smoking status: Not on file    Smokeless tobacco: Not on file   Substance Use Topics    Alcohol use: Not on file      Wt Readings from Last 1 Encounters:   25 113.9 kg (251 lb 1.7 oz)        Anesthesia Evaluation            ROS/MED HX  ENT/Pulmonary:       Neurologic:       Cardiovascular:     (+)  hypertension- -   -  - -                                      METS/Exercise Tolerance:     Hematologic:       Musculoskeletal:       GI/Hepatic:     (+)             liver disease,       Renal/Genitourinary:       Endo:       Psychiatric/Substance Use:     (+)   alcohol abuse      Infectious Disease:       Malignancy:       Other:              Physical Exam  Airway  Mallampati: II  TM distance: <3 FB  Mouth opening: < 4 cm    Cardiovascular   Rhythm: regular     Dental     Pulmonary Breath sounds clear to auscultation        Neurological   He appears awake.    Other Findings       OUTSIDE LABS:  CBC:   Lab Results   Component Value Date    WBC 6.4 2025    WBC 7.9 2025    HGB 10.8 (L) 2025    HGB 13.3 2025    HCT 31.6 (L) 2025    HCT 39.4 (L) 2025     (L) 2025     2025     BMP:   Lab Results   Component Value Date     2025     2025    POTASSIUM 3.8 2025    POTASSIUM 3.6 2025    CHLORIDE 99 2025    CHLORIDE 98 2025    CO2 23 2025    CO2 21 (L) 2025    BUN 11.0 2025    BUN 7.9 (L) 2025    CR 0.63 (L) 2025    CR 0.58 (L) 2025     (H) 2025     (H) 2025     COAGS:   Lab Results  "  Component Value Date    INR 1.68 (H) 07/03/2025     POC: No results found for: \"BGM\", \"HCG\", \"HCGS\"  HEPATIC:   Lab Results   Component Value Date    ALBUMIN 3.3 (L) 07/03/2025    PROTTOTAL 6.4 07/03/2025    ALT 33 07/03/2025     (H) 07/03/2025    ALKPHOS 133 07/03/2025    BILITOTAL 5.3 (H) 07/03/2025     OTHER:   Lab Results   Component Value Date    A1C 5.6 07/02/2025    ANDRES 8.5 (L) 07/03/2025    PHOS 3.1 07/03/2025    MAG 2.0 07/03/2025       Anesthesia Plan    ASA Status:  3       Anesthesia Type: MAC.   Techniques and Equipment:       - Monitoring Plan: standard ASA monitoring     Consents    Anesthesia Plan(s) and associated risks, benefits, and realistic alternatives discussed. Questions answered and patient/representative(s) expressed understanding.     - Discussed:     - Discussed with:  Patient        - Pt is DNR/DNI Status: no DNR          Postoperative Care         Comments:                   Jet Ibrahim MD    I have reviewed the pertinent notes and labs in the chart from the past 30 days and (re)examined the patient.  Any updates or changes from those notes are reflected in this note.    Clinically Significant Risk Factors Present on Admission           # Hypocalcemia: Lowest Ca = 8.5 mg/dL in last 2 days, will monitor and replace as appropriate     # Hypoalbuminemia: Lowest albumin = 3.3 g/dL at 7/3/2025  6:35 AM, will monitor as appropriate  # Coagulation Defect: INR = 1.68 (Ref range: 0.85 - 1.15) and/or PTT = N/A, will monitor for bleeding         # Anemia: based on hgb <11  # Anemia: based on hgb <11       # Obesity: Estimated body mass index is 37.06 kg/m  as calculated from the following:    Height as of this encounter: 1.753 m (5' 9.02\").    Weight as of this encounter: 113.9 kg (251 lb 1.7 oz).                    "

## 2025-07-03 NOTE — PROGRESS NOTES
[facility]    Medicine Progress Note - Hospitalist Service    Date of Admission:  7/2/2025    Assessment & Plan                Malcom Chowdhury is a 75 year old male with history of alcoholism, ADHD, mood disorder presented for evaluation of melena found to have variceal bleeding, now status post banding.  New diagnosis of alcoholic cirrhosis. Hospital Day: 2      # Acute upper GI bleed due to new diagnosis of esophageal varices, now status post banding  # Acute blood loss anemia  -Presented with 2 days of melenic stools  -hgb 13.3-->10.8  -INR 1.6, got vitamin K  - EGD completed 7/3 showing esophageal varices which were banded  -Octreotide drip, duration per GI  -IV PPI  -Okay for clear liquid diet per GI    # New diagnosis of alcoholic cirrhosis  # Probably some component of alcoholic hepatitis  #Extensive findings of portal hypertension  -CT abdomen showed:  2.  Cirrhosis with portal venous hypertension, including moderate ascites, numerous portosystemic collaterals, and splenomegaly.  3.  Innumerable hypodense nodules throughout the liver, most likely representing regenerative nodules. MRI recommended for further evaluation.  4.  Wall thickening involving the ascending colon and proximal small bowel could be secondary to portal enterocolopathy secondary to underlying liver disease.  5.  Several enlarged gastrohepatic and althea hepatis lymph nodes, possibly reactive.    -MELD-Na of 20  -INR rising since admission despite vit K administration, monitor closely  -GI following, initial workup ordered, will need outpatient hepatology followup  -trend LFT, BMP, CBC, INR  -Per GI, not a candidate for steroids due to active GI bleed  -MRI liver pending    # Large volume ascites due to above  -Diagnostic and therapeutic paracentesis planned for today  -on empiric Rocephin    # Alcoholism  -Was intoxicated on admission  -continue CIWA- last needed protocol triggered diazepam at 6 AM  - consult for  "resources. Patient states motivated to quit drinking    # Thrombocytopenia  -Possibly due to hepatic dysfunction as above although also could be consumptive related to active bleeding  -Trend    #RLS  - Currently on gabapentin for CIWA protocol which may help this  - Consider continuing gabapentin on discharge if patient interested    # Peripheral edema  -continue compression per home regimen    #Heart murmur  - Systolic murmur noted on exam, patient states this has never been mentioned to him in the past.  I could not find mention of it in a chart search.  No previous echocardiogram available for review.  He does endorse some dyspnea on exertion.  Rather suspect this is a flow murmur related to blood loss anemia, however will obtain an echo given his exertional dyspnea.            Diet: Clear Liquid Diet    DVT Prophylaxis: Moderate risk. Pharmacologic prophylaxis contraindicated due to thrombocytopenia. SCDs contraindicated due to 2+ pitting edema  Erazo Catheter: Not present  Lines: None     Cardiac Monitoring: ACTIVE order. Indication: QTc prolonging medication (48 hours)  Code Status: Full Code      Clinically Significant Risk Factors Present on Admission           # Hypocalcemia: Lowest Ca = 8.5 mg/dL in last 2 days, will monitor and replace as appropriate     # Hypoalbuminemia: Lowest albumin = 3.3 g/dL at 7/3/2025  6:35 AM, will monitor as appropriate  # Coagulation Defect: INR = 1.68 (Ref range: 0.85 - 1.15) and/or PTT = N/A, will monitor for bleeding         # Anemia: based on hgb <11  # Anemia: based on hgb <11       # Obesity: Estimated body mass index is 37.06 kg/m  as calculated from the following:    Height as of this encounter: 1.753 m (5' 9.02\").    Weight as of this encounter: 113.9 kg (251 lb 1.7 oz).              Disposition Plan     Medically Ready for Discharge: Anticipated in 2-4 Days         Discharge barrier(s): Octreotide drip, anemia, paracentesis culture results  Care discussed with: " patient      Dulce Prieto MD  Hospitalist Service  [facility]  Securely message with [employer] (more info)  Text page via [employer] Paging/Directory   ______________________________________________________________________      Physical Exam   Vital Signs: Temp: 98.6  F (37  C) Temp src: Oral BP: 126/84 Pulse: 84   Resp: 18 SpO2: 92 % O2 Device: None (Room air)    Weight: 251 lbs 1.66 oz    General: in no apparent distress, non-toxic, and alert male lying in hospital bed oriented x3  HEENT: Head normocephalic atraumatic, oral mucosa moist. Sclerae anicteric  CV: Regular rhythm, normal rate, soft holosystolic murmur heard best RUSB  Resp: No wheezes, no rales or rhonchi, no focal consolidations  GI: Belly soft, distended with ascites, nontender, bowel sounds present  Skin: pale.  Numerous spider telangiectasias BLE  Extremities: 2+ pitting edema bilateral ankles  Psych: Normal affect, mildly anxious mood  Neuro: Grossly normal      Medical Decision Making               Data   Recent Results (from the past 16 hours)   Folate    Collection Time: 07/03/25  1:13 AM   Result Value Ref Range    Folic Acid 2.9 (L) 4.6 - 34.8 ng/mL   Comprehensive metabolic panel    Collection Time: 07/03/25  6:35 AM   Result Value Ref Range    Sodium 135 135 - 145 mmol/L    Potassium 3.8 3.4 - 5.3 mmol/L    Carbon Dioxide (CO2) 23 22 - 29 mmol/L    Anion Gap 13 7 - 15 mmol/L    Urea Nitrogen 11.0 8.0 - 23.0 mg/dL    Creatinine 0.63 (L) 0.67 - 1.17 mg/dL    GFR Estimate >90 >60 mL/min/1.73m2    Calcium 8.5 (L) 8.8 - 10.4 mg/dL    Chloride 99 98 - 107 mmol/L    Glucose 153 (H) 70 - 99 mg/dL    Alkaline Phosphatase 133 40 - 150 U/L     (H) 0 - 45 U/L    ALT 33 0 - 70 U/L    Protein Total 6.4 6.4 - 8.3 g/dL    Albumin 3.3 (L) 3.5 - 5.2 g/dL    Bilirubin Total 5.3 (H) <=1.2 mg/dL   CBC with platelets    Collection Time: 07/03/25  6:35 AM   Result Value Ref Range    WBC Count 6.4 4.0 - 11.0 10e3/uL    RBC Count 3.12 (L)  4.40 - 5.90 10e6/uL    Hemoglobin 10.8 (L) 13.3 - 17.7 g/dL    Hematocrit 31.6 (L) 40.0 - 53.0 %     (H) 78 - 100 fL    MCH 34.6 (H) 26.5 - 33.0 pg    MCHC 34.2 31.5 - 36.5 g/dL    RDW 14.6 10.0 - 15.0 %    Platelet Count 144 (L) 150 - 450 10e3/uL   Phosphorus    Collection Time: 07/03/25  6:35 AM   Result Value Ref Range    Phosphorus 3.1 2.5 - 4.5 mg/dL   Magnesium    Collection Time: 07/03/25  6:35 AM   Result Value Ref Range    Magnesium 2.0 1.7 - 2.3 mg/dL   INR    Collection Time: 07/03/25  6:35 AM   Result Value Ref Range    INR 1.68 (H) 0.85 - 1.15    PT 20.0 (H) 11.8 - 14.8 Seconds   Bilirubin direct    Collection Time: 07/03/25  6:35 AM   Result Value Ref Range    Bilirubin Direct 3.34 (H) 0.00 - 0.30 mg/dL   UPPER GI ENDOSCOPY    Collection Time: 07/03/25 11:16 AM   Result Value Ref Range    Upper GI Endoscopy       [facility]  [address] [place] [address]  _______________________________________________________________________________  Patient Name: Malcom Campuzano            Procedure Date: 7/3/2025 11:16 AM  MRN: [id number]                       Account Number: [id number]  YOB: 1950              Admit Type: Inpatient  Age: 75                               Room: [place] [address]  Note Status: Finalized                Attending MD: KVNG IZQUIERDO MD,   Instrument Name: EGD Scope 2066         _______________________________________________________________________________     Procedure:             Upper GI endoscopy  Indications:           Melena  Providers:             KVNG IZQUIERDO MD  Referring MD:            Medicines:             Propofol per Anesthesia  Complications:         No immediate complications.  _______________________________________________________________________________  Procedure:             Pre-Anesthesia Assessment:                          - Prior to the procedure, a History and Physical was                          performed, and  patient medications and allergies were                          reviewed. The patient is competent. The risks and                          benefits of the procedure and the sedation options and                          risks were discussed with the patient. All questions                          were answered and informed consent was obtained.                          Patient identification and proposed procedure were                          verified by the physician in the pre-procedure area.                          Prophylactic Antibiotics: The patient does not require                          prophylactic antibiotics. Prior Anticoagulants: The                          patient has taken no anticoagulant or antiplatelet                          agents. ASA Grade Assessment: IV - A patient with                          severe systemic disease that is a constant threat to                           life. After reviewing the risks and benefits, the                          patient was deemed in satisfactory condition to                          undergo the procedure. The anesthesia plan was to use                          monitored anesthesia care (MAC). Immediately prior to                          administration of medications, the patient was                          re-assessed for adequacy to receive sedatives. The                          heart rate, respiratory rate, oxygen saturations,                          blood pressure, adequacy of pulmonary ventilation, and                          response to care were monitored throughout the                          procedure. The physical status of the patient was                          re-assessed after the procedure.                         After obtaining informed consent, the endoscope was                          passed under direct vision. Throughout the procedure,                          the patient's bloo d pressure, pulse, and oxygen                           saturations were monitored continuously. The endoscope                          was introduced through the mouth, and advanced to the                          second part of duodenum. The upper GI endoscopy was                          accomplished without difficulty. The patient tolerated                          the procedure well.                                                                                   Findings:       Large (> 5 mm) varices were found in the lower third of the esophagus.        Five bands were successfully placed with complete eradication, resulting        in deflation of varices. There was no bleeding during and at the end of        the procedure.       Moderate portal hypertensive gastropathy was found in the entire        examined stomach.       The examined duodenum was normal.                                                                                   Moderate Sedation:       Moderate (consci ous) sedation was administered by Anesthesia. The        patient's oxygen saturation, heart rate, blood pressure and response to        care were monitored.  Impression:            - Large (> 5 mm) esophageal varices. Completely                          eradicated. Banded.                         - Portal hypertensive gastropathy.                         - Normal examined duodenum.                         - No specimens collected.  Recommendation:        - [facility].                         - Repeat upper endoscopy in 1 month for retreatment.                                                                                       ____________________  KVNG IZQUIERDO MD  7/3/2025 11:45:11 AM  I was physically present for the entire viewing portion of the exam.  __________________________  Signature of teaching physician  B4c/Inessa IZQUIERDO MD  Number of Addenda: 0    Note Initiated On: 7/3/2025 11:16 AM  Scope In: 11:28:22 AM  Scope Out:  11:39:24  AM     Glucose by meter    Collection Time: 07/03/25 12:42 PM   Result Value Ref Range    GLUCOSE BY METER POCT 137 (H) 70 - 99 mg/dL       Interval History   Patient complains of some burning substernal and upper abdominal pain following the EGD today.  States no further melena.  Complains of abdominal discomfort from ascites.  Not currently wearing his compression stockings, he will have his wife bring these.  Expresses desire to get sober.  He is unsure if he is willing to go to inpatient chemical dependency treatment program.  He is interested in talking more with the .  Continues on octreotide drip.

## 2025-07-03 NOTE — PLAN OF CARE
Goal Outcome Evaluation:    Problem: Alcohol Withdrawal  Goal: Alcohol Withdrawal Symptom Control  Outcome: Progressing     Pt is alert and oriented x 4. Pt potassium protocol was run no supplements needed , recheck in the morning. Pt was changed from NPO to clear liquid diet. Pt CIWA scores were 0 .Pt had EGD and found he had esophageal varices.Pt de sating with 0 2 levels  between 89 to 90 , Pt is now on 1L of oxygen.

## 2025-07-03 NOTE — PROGRESS NOTES
"NS ADMISSION NOTE    Patient admitted to room 204 at approximately 2000 via wheel chair from emergency room. Patient was accompanied by transport tech and nurse.     Verbal SBAR report received from JAKI Greene prior to patient arrival.     Patient ambulated to bed with stand-by assist. Patient alert and oriented X 3. The patient is not having any pain.  . Admission vital signs: Blood pressure 132/78, pulse 92, temperature 99.1  F (37.3  C), temperature source Oral, resp. rate 18, height 1.753 m (5' 9.02\"), weight 113.9 kg (251 lb 1.7 oz), SpO2 92%. Patient was oriented to plan of care, call light, bed controls, tv, telephone, bathroom, and visiting hours.     Risk Assessment    The following safety risks were identified during admission: none. Yellow risk band applied: NO.     Skin Initial Assessment    This writer admitted this patient and completed a full skin assessment and Nathen score in the Adult PCS flowsheet.     Nathen Risk Assessment  Sensory Perception: 4-->no impairment  Moisture: 4-->rarely moist  Activity: 3-->walks occasionally  Mobility: 3-->slightly limited  Nutrition: 2-->probably inadequate  Friction and Shear: 3-->no apparent problem  Nathen Score: 19  Nutrition Interventions: Encourage protein intake, Maintain adequate hydration  Mattress: Standard gel/foam mattress (IsoFlex, Atmos Air, etc.)  Bed Frame: Standard width and length      Lola Yo RN       "

## 2025-07-03 NOTE — PLAN OF CARE
Problem: Nausea and Vomiting  Goal: Nausea and Vomiting Relief  Outcome: Progressing  Problem: Alcohol Withdrawal  Goal: Alcohol Withdrawal Symptom Control  Outcome: Progressing  Goal: Optimal Neurologic Function  Outcome: Progressing  Goal: Readiness for Change Identified  Outcome: Progressing  Problem: Gastrointestinal Bleeding  Goal: Optimal Coping with Acute Illness  Outcome: Progressing    Patient denies pain. VSS. Patient started showing symptoms of alcohol withdrawal, hospitalist notified, CIWA protocol ordered. Scored a 12 for nausea, tremor, diaphoretic, anxiety, headache, and restlessness/agitation. Given PRN valium, brought CIWA to 1. On IVF and octreotide gtt. Patient's symptoms came back, then scored a 10 on CIWA. Given PRN valium.    Patient is motivated to be sober and would like to go to treatment.    Abdomen is round and distended. Plan for paracentesis today. Patient has swelling of lower extremities.    Patient having black loose stool, x4 overnight. Plan for EGG today, NPO since midnight.    Patient educated on plan of care, answered all questions and concerns, verbalized understanding. Plan of care ongoing.

## 2025-07-04 PROBLEM — I50.22 CHRONIC SYSTOLIC HEART FAILURE (H): Status: ACTIVE | Noted: 2025-07-04

## 2025-07-04 PROBLEM — K70.31 ALCOHOLIC CIRRHOSIS OF LIVER WITH ASCITES (H): Status: ACTIVE | Noted: 2025-07-04

## 2025-07-04 PROBLEM — I89.0 LYMPHEDEMA: Status: ACTIVE | Noted: 2025-07-04

## 2025-07-04 PROBLEM — F10.20 ALCOHOLISM (H): Status: ACTIVE | Noted: 2025-07-04

## 2025-07-04 PROBLEM — I85.11 SECONDARY ESOPHAGEAL VARICES WITH BLEEDING (H): Status: ACTIVE | Noted: 2025-07-04

## 2025-07-04 PROBLEM — D62 ABLA (ACUTE BLOOD LOSS ANEMIA): Status: ACTIVE | Noted: 2025-07-04

## 2025-07-04 LAB
ALBUMIN SERPL BCG-MCNC: 2.9 G/DL (ref 3.5–5.2)
ALP SERPL-CCNC: 123 U/L (ref 40–150)
ALT SERPL W P-5'-P-CCNC: 31 U/L (ref 0–70)
ANION GAP SERPL CALCULATED.3IONS-SCNC: 9 MMOL/L (ref 7–15)
AST SERPL W P-5'-P-CCNC: 119 U/L (ref 0–45)
BILIRUB DIRECT SERPL-MCNC: 3.81 MG/DL (ref 0–0.3)
BILIRUB SERPL-MCNC: 5.9 MG/DL
BUN SERPL-MCNC: 11.5 MG/DL (ref 8–23)
CALCIUM SERPL-MCNC: 8.5 MG/DL (ref 8.8–10.4)
CHLORIDE SERPL-SCNC: 100 MMOL/L (ref 98–107)
CREAT SERPL-MCNC: 0.7 MG/DL (ref 0.67–1.17)
EGFRCR SERPLBLD CKD-EPI 2021: >90 ML/MIN/1.73M2
ERYTHROCYTE [DISTWIDTH] IN BLOOD BY AUTOMATED COUNT: 14.2 % (ref 10–15)
GLUCOSE BLDC GLUCOMTR-MCNC: 101 MG/DL (ref 70–99)
GLUCOSE BLDC GLUCOMTR-MCNC: 123 MG/DL (ref 70–99)
GLUCOSE SERPL-MCNC: 130 MG/DL (ref 70–99)
HAV AB SER QL IA: REACTIVE
HBV CORE AB SERPL QL IA: NONREACTIVE
HBV SURFACE AB SERPL IA-ACNC: <3.5 M[IU]/ML
HBV SURFACE AB SERPL IA-ACNC: NONREACTIVE M[IU]/ML
HBV SURFACE AG SERPL QL IA: NONREACTIVE
HCO3 SERPL-SCNC: 26 MMOL/L (ref 22–29)
HCT VFR BLD AUTO: 32.1 % (ref 40–53)
HCV AB SERPL QL IA: NONREACTIVE
HGB BLD-MCNC: 10.6 G/DL (ref 13.3–17.7)
HOLD SPECIMEN: NORMAL
INR PPP: 1.57 (ref 0.85–1.15)
MCH RBC QN AUTO: 34.5 PG (ref 26.5–33)
MCHC RBC AUTO-ENTMCNC: 33 G/DL (ref 31.5–36.5)
MCV RBC AUTO: 105 FL (ref 78–100)
PLATELET # BLD AUTO: 139 10E3/UL (ref 150–450)
POTASSIUM SERPL-SCNC: 3.7 MMOL/L (ref 3.4–5.3)
PROT SERPL-MCNC: 6.1 G/DL (ref 6.4–8.3)
PROTHROMBIN TIME: 19 SECONDS (ref 11.8–14.8)
RBC # BLD AUTO: 3.07 10E6/UL (ref 4.4–5.9)
SODIUM SERPL-SCNC: 135 MMOL/L (ref 135–145)
WBC # BLD AUTO: 5.6 10E3/UL (ref 4–11)

## 2025-07-04 PROCEDURE — 86708 HEPATITIS A ANTIBODY: CPT | Performed by: INTERNAL MEDICINE

## 2025-07-04 PROCEDURE — 86803 HEPATITIS C AB TEST: CPT | Performed by: INTERNAL MEDICINE

## 2025-07-04 PROCEDURE — 82248 BILIRUBIN DIRECT: CPT | Performed by: HOSPITALIST

## 2025-07-04 PROCEDURE — 80048 BASIC METABOLIC PNL TOTAL CA: CPT | Performed by: HOSPITALIST

## 2025-07-04 PROCEDURE — 85610 PROTHROMBIN TIME: CPT | Performed by: HOSPITALIST

## 2025-07-04 PROCEDURE — 250N000013 HC RX MED GY IP 250 OP 250 PS 637: Performed by: STUDENT IN AN ORGANIZED HEALTH CARE EDUCATION/TRAINING PROGRAM

## 2025-07-04 PROCEDURE — 86706 HEP B SURFACE ANTIBODY: CPT | Performed by: INTERNAL MEDICINE

## 2025-07-04 PROCEDURE — 99232 SBSQ HOSP IP/OBS MODERATE 35: CPT | Performed by: HOSPITALIST

## 2025-07-04 PROCEDURE — 250N000013 HC RX MED GY IP 250 OP 250 PS 637: Performed by: HOSPITALIST

## 2025-07-04 PROCEDURE — 36415 COLL VENOUS BLD VENIPUNCTURE: CPT | Performed by: HOSPITALIST

## 2025-07-04 PROCEDURE — 120N000001 HC R&B MED SURG/OB

## 2025-07-04 PROCEDURE — 250N000011 HC RX IP 250 OP 636: Performed by: HOSPITALIST

## 2025-07-04 PROCEDURE — 258N000003 HC RX IP 258 OP 636: Performed by: EMERGENCY MEDICINE

## 2025-07-04 PROCEDURE — 86704 HEP B CORE ANTIBODY TOTAL: CPT | Performed by: INTERNAL MEDICINE

## 2025-07-04 PROCEDURE — 87340 HEPATITIS B SURFACE AG IA: CPT | Performed by: INTERNAL MEDICINE

## 2025-07-04 PROCEDURE — 85014 HEMATOCRIT: CPT | Performed by: HOSPITALIST

## 2025-07-04 PROCEDURE — 250N000011 HC RX IP 250 OP 636: Performed by: STUDENT IN AN ORGANIZED HEALTH CARE EDUCATION/TRAINING PROGRAM

## 2025-07-04 PROCEDURE — 250N000012 HC RX MED GY IP 250 OP 636 PS 637: Mod: JA | Performed by: EMERGENCY MEDICINE

## 2025-07-04 RX ORDER — FUROSEMIDE 20 MG/1
20 TABLET ORAL DAILY
Status: DISCONTINUED | OUTPATIENT
Start: 2025-07-04 | End: 2025-07-07 | Stop reason: HOSPADM

## 2025-07-04 RX ORDER — SPIRONOLACTONE 25 MG/1
25 TABLET ORAL DAILY
Status: DISCONTINUED | OUTPATIENT
Start: 2025-07-04 | End: 2025-07-07 | Stop reason: HOSPADM

## 2025-07-04 RX ADMIN — CLONIDINE HYDROCHLORIDE 0.1 MG: 0.1 TABLET ORAL at 01:27

## 2025-07-04 RX ADMIN — Medication 1 TABLET: at 08:52

## 2025-07-04 RX ADMIN — THIAMINE HCL TAB 100 MG 100 MG: 100 TAB at 08:51

## 2025-07-04 RX ADMIN — OCTREOTIDE ACETATE 50 MCG/HR: 500 INJECTION, SOLUTION INTRAVENOUS; SUBCUTANEOUS at 20:36

## 2025-07-04 RX ADMIN — PANTOPRAZOLE SODIUM 40 MG: 40 INJECTION, POWDER, FOR SOLUTION INTRAVENOUS at 17:07

## 2025-07-04 RX ADMIN — FOLIC ACID 1 MG: 5 INJECTION, SOLUTION INTRAMUSCULAR; INTRAVENOUS; SUBCUTANEOUS at 09:04

## 2025-07-04 RX ADMIN — CLONIDINE HYDROCHLORIDE 0.1 MG: 0.1 TABLET ORAL at 17:06

## 2025-07-04 RX ADMIN — CLONIDINE HYDROCHLORIDE 0.1 MG: 0.1 TABLET ORAL at 08:51

## 2025-07-04 RX ADMIN — SPIRONOLACTONE 25 MG: 25 TABLET, FILM COATED ORAL at 11:55

## 2025-07-04 RX ADMIN — CEFTRIAXONE SODIUM 2 G: 2 INJECTION, POWDER, FOR SOLUTION INTRAMUSCULAR; INTRAVENOUS at 20:36

## 2025-07-04 RX ADMIN — PANTOPRAZOLE SODIUM 40 MG: 40 INJECTION, POWDER, FOR SOLUTION INTRAVENOUS at 05:18

## 2025-07-04 RX ADMIN — GABAPENTIN 900 MG: 300 CAPSULE ORAL at 17:06

## 2025-07-04 RX ADMIN — GABAPENTIN 900 MG: 300 CAPSULE ORAL at 01:27

## 2025-07-04 RX ADMIN — CALCIUM CARBONATE (ANTACID) CHEW TAB 500 MG 1000 MG: 500 CHEW TAB at 09:18

## 2025-07-04 RX ADMIN — GABAPENTIN 900 MG: 300 CAPSULE ORAL at 08:52

## 2025-07-04 ASSESSMENT — ACTIVITIES OF DAILY LIVING (ADL)
ADLS_ACUITY_SCORE: 35
ADLS_ACUITY_SCORE: 34
ADLS_ACUITY_SCORE: 35
DEPENDENT_IADLS:: INDEPENDENT
ADLS_ACUITY_SCORE: 35

## 2025-07-04 NOTE — PROGRESS NOTES
"GI PROGRESS NOTE  7/4/2025  Malcom Noen  1950  /-64    Subjective:   Denies new complaints.  Tolerating current diet and asking for more variety.  Some reflux symptoms.  No nausea, vomiting.  Had loose black stool yesterday morning, but nothing since.  Had improvement in his shortness of breath following paracentesis yesterday, however still feels distended.    Objective:     Blood pressure 106/57, pulse 79, temperature 98.4  F (36.9  C), temperature source Oral, resp. rate 16, height 1.753 m (5' 9.02\"), weight 113.9 kg (251 lb 1.7 oz), SpO2 97%.    Body mass index is 37.06 kg/m .  Gen: NAD, Sitting up in chair.    Eyes: Bilateral icterus  Cardio: RRR  GI: Obese. Distended, BS positive, soft, non-tender  Neuro: Alert and orientated  Skin: jaundice    Laboratory:  BMP  Recent Labs   Lab 07/04/25  0603 07/03/25  2107 07/03/25  1721 07/03/25  1242 07/03/25  0635 07/02/25  2126 07/02/25  1608     --   --   --  135  --  135   POTASSIUM 3.7  --   --   --  3.8  --  3.6   CHLORIDE 100  --   --   --  99  --  98   ANDRES 8.5*  --   --   --  8.5*  --  8.7*   CO2 26  --   --   --  23  --  21*   BUN 11.5  --   --   --  11.0  --  7.9*   CR 0.70  --   --   --  0.63*  --  0.58*   * 193* 145*   < > 153*   < > 141*    < > = values in this interval not displayed.     CBC  Recent Labs   Lab 07/04/25  0603 07/03/25  0635 07/02/25  1608   WBC 5.6 6.4 7.9   RBC 3.07* 3.12* 3.92*   HGB 10.6* 10.8* 13.3   HCT 32.1* 31.6* 39.4*   * 101* 101*   MCH 34.5* 34.6* 33.9*   MCHC 33.0 34.2 33.8   RDW 14.2 14.6 14.1   * 144* 213     INR  Recent Labs   Lab 07/04/25  0603 07/03/25  0635 07/02/25  1608   INR 1.57* 1.68* 1.44*      LFTs  Recent Labs   Lab Test 07/04/25  0603 07/03/25  0635 07/02/25  1759 07/02/25  1608   ALBUMIN 2.9* 3.3*  --  3.3*   BILITOTAL 5.9* 5.3*  --  5.8*   ALT 31 33  --  44   * 120*  --  161*   PROTEIN  --   --  Negative  --        Imaging:  EXAM: MR LIVER W/O and W " CONTRAST  LOCATION: [facility]  DATE: 7/3/2025     INDICATION: nodules in the liver  COMPARISON: CT abdomen and pelvis 7/2/2025.  TECHNIQUE: Routine MRI liver protocol including T1 in/out phase, diffusion, multiplane T2, and dynamic T1 with IV contrast.    CONTRAST: 11.5 mL Gadavist     FINDINGS: Moderate motion artifact.     LIVER: Nodular hepatic contour with  left hepatic lobe hypertrophy consistent with cirrhosis. 0.9 cm hyperenhancing nodule within segment 4A (image 25 series 11). 1 cm cyst within segment 2. Multiple, additional, small low T1, nonenhancing nodules likely   representing multiple regenerative nodules. Normal portal venous and hepatic venous enhancement.     ADDITIONAL FINDINGS: Mild splenomegaly. Spleen measures 15.9 cm in length. Normal splenic vein enhancement. Pancreas, adrenals and visualized portions of the kidneys are within its. Small to moderate amount of ascites within the upper abdomen. Normal   caliber visualized small bowel and colon. Several, stable mildly prominent peripancreatic and althea hepatis lymph nodes, which are likely reactive. Small paraesophageal varices.                                                                      IMPRESSION:  1.  Cirrhosis.  2.  0.9 cm hyperenhancing nodule within segment 4A. This represents a LI-RADS 3. Recommend follow-up MRI in 3-6 months.  3.  Mild splenomegaly.  4.  Small to moderate amount of ascites. Small periesophageal varices.    Procedures:  EGD 7/3/25:  Findings:       Large (> 5 mm) varices were found in the lower third of the esophagus.        Five bands were successfully placed with complete eradication, resulting        in deflation of varices. There was no bleeding during and at the end of        the procedure.        Moderate portal hypertensive gastropathy was found in the entire        examined stomach.        The examined duodenum was normal.      Assessment:   Cirrhosis  75-year-old male with  history of alcohol use disorder, new diagnosis of cirrhosis complicated by ascites, portal hypertension presenting with melena and found to have large esophageal varices status post banding 7/3/2025.  MELD 20 today.  A. Esophageal varices  Status post banding 7/3/2025.  He will need repeat banding in 1 month.  Hemoglobin 10.6.  Octreotide for 72 hours.  B.  Ascites  Status post paracentesis 7/3/2025 with 2.1 L removed.  Negative for SBP.  He is on ceftriaxone for SBP prophylaxis given his concurrent GI bleeding.  Creatinine normal.  BP stable.  Will start diuretics.    2. Liver nodule  Seen on CTA and then follow-up liver MR 7/3/25. LI-RADs 3. Repeat imaging in 3-6 months.          Plan:   1.  Continue octreotide for total of 72 hours  2.  5-day course of ceftriaxone or similar oral medication for SBP prophylaxis  3.  Spironolactone 50 Mg, furosemide 20 Mg daily  4.  Trend MELD labs daily  5.  EGD in 1 month for variceal surveillance.  Our office will arrange.  6.  Follow-up in [facility] hepatology clinic.  Our office will arrange.  7.  Repeat Imaging liver in 3-6 months. Our office will arrange.   8. Recommend complete alcohol cessation.  Social work to see this admission and give options for treatment.  He tells me he is motivated to quit drinking.        27 minutes of total time was spent providing patient care, including patient evaluation, reviewing documentation/test results, , and documentation.                                                                        Azra Gallardo PA-C  Thank you for the opportunity to participate in the care of this patient.   Please feel free to call me with any questions or concerns.  Phone number [phone].

## 2025-07-04 NOTE — PLAN OF CARE
Goal Outcome Evaluation:         Pt is alert and oriented x 4.  Pt had a brown loose involuntary stool, cleaned himself up, didn't mention to staff until rounding in room.  CIWA scores are 1 and 2 today, based on headache, felt better after he ate.  Pt tolerated clear liquid diet, ordering full liquid lunch now at 1400 hour.  Pt walked in halls today, SBA, just for visualization, otherwise could be independent as he is in the room.  Lasix held today due to blood pressure, Hospitalist added parameters to diuretics.  Pt had scheduled clonidine this am.  Pt denies nausea.

## 2025-07-04 NOTE — PLAN OF CARE
"  Problem: Adult Inpatient Plan of Care  Goal: Plan of Care Review  Description: The Plan of Care Review/Shift note should be completed every shift.  The Outcome Evaluation is a brief statement about your assessment that the patient is improving, declining, or no change.  This information will be displayed automatically on your shift  note.  Outcome: Progressing     Problem: Adult Inpatient Plan of Care  Goal: Patient-Specific Goal (Individualized)  Description: You can add care plan individualizations to a care plan. Examples of Individualization might be:  \"Parent requests to be called daily at 9am for status\", \"I have a hard time hearing out of my right ear\", or \"Do not touch me to wake me up as it startles  me\".  Outcome: Progressing     Problem: Adult Inpatient Plan of Care  Goal: Absence of Hospital-Acquired Illness or Injury  Outcome: Progressing  Intervention: Identify and Manage Fall Risk  Recent Flowsheet Documentation  Taken 7/3/2025 2345 by Brodie Oshea RN  Safety Promotion/Fall Prevention:   activity supervised   clutter free environment maintained   increased rounding and observation   increase visualization of patient   lighting adjusted   nonskid shoes/slippers when out of bed   room door open   room near nurse's station   room organization consistent   safety round/check completed  Intervention: Prevent Skin Injury  Recent Flowsheet Documentation  Taken 7/3/2025 2345 by Brodie Oshea RN  Body Position: position changed independently  Intervention: Prevent and Manage VTE (Venous Thromboembolism) Risk  Recent Flowsheet Documentation  Taken 7/3/2025 2345 by Brodie Oshea RN  VTE Prevention/Management: SCDs on (sequential compression devices)  Intervention: Prevent Infection  Recent Flowsheet Documentation  Taken 7/3/2025 2345 by Brodie Oshea RN  Infection Prevention:   cohorting utilized   hand hygiene promoted   rest/sleep promoted     Problem: Adult Inpatient Plan of " Care  Goal: Absence of Hospital-Acquired Illness or Injury  Intervention: Identify and Manage Fall Risk  Recent Flowsheet Documentation  Taken 7/3/2025 2345 by Brodie Oshea RN  Safety Promotion/Fall Prevention:   activity supervised   clutter free environment maintained   increased rounding and observation   increase visualization of patient   lighting adjusted   nonskid shoes/slippers when out of bed   room door open   room near nurse's station   room organization consistent   safety round/check completed     Problem: Adult Inpatient Plan of Care  Goal: Absence of Hospital-Acquired Illness or Injury  Intervention: Prevent Skin Injury  Recent Flowsheet Documentation  Taken 7/3/2025 2345 by Brodie Oshea RN  Body Position: position changed independently     Problem: Adult Inpatient Plan of Care  Goal: Absence of Hospital-Acquired Illness or Injury  Intervention: Prevent and Manage VTE (Venous Thromboembolism) Risk  Recent Flowsheet Documentation  Taken 7/3/2025 2345 by Brodie Oshea RN  VTE Prevention/Management: SCDs on (sequential compression devices)     Problem: Adult Inpatient Plan of Care  Goal: Optimal Comfort and Wellbeing  Outcome: Progressing  Intervention: Provide Person-Centered Care  Recent Flowsheet Documentation  Taken 7/3/2025 2345 by Brodie Oshea RN  Trust Relationship/Rapport:   care explained   choices provided   emotional support provided   empathic listening provided   questions answered   questions encouraged   reassurance provided   thoughts/feelings acknowledged     Problem: Adult Inpatient Plan of Care  Goal: Optimal Comfort and Wellbeing  Intervention: Provide Person-Centered Care  Recent Flowsheet Documentation  Taken 7/3/2025 2345 by Brodie Oshea RN  Trust Relationship/Rapport:   care explained   choices provided   emotional support provided   empathic listening provided   questions answered   questions encouraged   reassurance provided    thoughts/feelings acknowledged     Problem: Adult Inpatient Plan of Care  Goal: Readiness for Transition of Care  Outcome: Progressing     Problem: Delirium  Goal: Improved Attention and Thought Clarity  Outcome: Progressing  Intervention: Maximize Cognitive Function  Recent Flowsheet Documentation  Taken 7/3/2025 2345 by Brodie Oshea RN  Sensory Stimulation Regulation:   care clustered   lighting decreased   quiet environment promoted  Reorientation Measures: clock in view     Problem: Delirium  Goal: Improved Attention and Thought Clarity  Intervention: Maximize Cognitive Function  Recent Flowsheet Documentation  Taken 7/3/2025 2345 by Brodie Oshea RN  Sensory Stimulation Regulation:   care clustered   lighting decreased   quiet environment promoted  Reorientation Measures: clock in view     Problem: Infection  Goal: Absence of Infection Signs and Symptoms  Outcome: Progressing     Problem: Alcohol Withdrawal  Goal: Alcohol Withdrawal Symptom Control  Intervention: Minimize or Manage Alcohol Withdrawal Symptoms  Recent Flowsheet Documentation  Taken 7/3/2025 2345 by Brodie Oshea RN  Sensory Stimulation Regulation:   care clustered   lighting decreased   quiet environment promoted     Problem: Alcohol Withdrawal  Goal: Optimal Neurologic Function  Outcome: Progressing  Intervention: Minimize or Manage Acute Neurologic Symptoms  Recent Flowsheet Documentation  Taken 7/3/2025 2345 by Brodie Oshea RN  Sensory Stimulation Regulation:   care clustered   lighting decreased   quiet environment promoted     Problem: Gastrointestinal Bleeding  Goal: Hemostasis  Intervention: Manage Gastrointestinal Bleeding  Recent Flowsheet Documentation  Taken 7/3/2025 2345 by Brodie Oshea RN  Bleeding Management: prepared for surgical intervention   Goal Outcome Evaluation:        Patient A&O X 4, on CIWA score 1-0, 1 L O2 via NC. On continouus Octreotide drip @ 50 mcg/hr.    Up to the toilet  with SBA, on clear liquid diet, tele NSR with first degree AV Block.      Patient makes needs known. Plan of care ongoing.      Brodie LEONARD RN.

## 2025-07-04 NOTE — CONSULTS
Care Management Initial Consult    General Information  Assessment completed with: Patient,    Type of CM/SW Visit: Initial Assessment    Primary Care Provider verified and updated as needed: Yes   Readmission within the last 30 days: no previous admission in last 30 days      Reason for Consult: discharge planning, substance use concerns  Advance Care Planning: Advance Care Planning Reviewed: present on chart          Communication Assessment  Patient's communication style: spoken language (English or Bilingual)    Hearing Difficulty or Deaf: no   Wear Glasses or Blind: yes    Cognitive  Cognitive/Neuro/Behavioral: WDL  Level of Consciousness: alert  Arousal Level: opens eyes spontaneously  Orientation: oriented x 4  Mood/Behavior: calm, cooperative  Best Language: 0 - No aphasia  Speech: clear    Living Environment:   People in home: spouse  ana  Current living Arrangements: house      Able to return to prior arrangements: yes       Family/Social Support:  Care provided by: self, spouse/significant other  Provides care for:    Marital Status:   Support system: Wife  ana       Description of Support System: Supportive, Involved    Support Assessment: Adequate family and caregiver support    Current Resources:   Patient receiving home care services: No        Community Resources: None  Equipment currently used at home: none  Supplies currently used at home: None    Employment/Financial:  Employment Status: retired        Financial Concerns: none           Does the patient's insurance plan have a 3 day qualifying hospital stay waiver?  No    Lifestyle & Psychosocial Needs:  Social Drivers of Health     Food Insecurity: Low Risk  (7/2/2025)    Food Insecurity     Within the past 12 months, did you worry that your food would run out before you got money to buy more?: No     Within the past 12 months, did the food you bought just not last and you didn t have money to get more?: No   Depression: Not on file    Housing Stability: Low Risk  (7/2/2025)    Housing Stability     Do you have housing? : Yes     Are you worried about losing your housing?: No   Tobacco Use: Not on file (6/20/2017)   Financial Resource Strain: Low Risk  (7/2/2025)    Financial Resource Strain     Within the past 12 months, have you or your family members you live with been unable to get utilities (heat, electricity) when it was really needed?: No   Alcohol Use: Not on file   Transportation Needs: Low Risk  (7/2/2025)    Transportation Needs     Within the past 12 months, has lack of transportation kept you from medical appointments, getting your medicines, non-medical meetings or appointments, work, or from getting things that you need?: No   Physical Activity: Not on file   Interpersonal Safety: Low Risk  (7/2/2025)    Interpersonal Safety     Do you feel physically and emotionally safe where you currently live?: Yes     Within the past 12 months, have you been hit, slapped, kicked or otherwise physically hurt by someone?: No     Within the past 12 months, have you been humiliated or emotionally abused in other ways by your partner or ex-partner?: No   Stress: Not on file   Social Connections: Not on file   Health Literacy: Not on file       Functional Status:  Prior to admission patient needed assistance:   Dependent ADLs:: Independent  Dependent IADLs:: Independent  Assesssment of Functional Status: Not at baseline with ADL Functioning    Mental Health Status:  Mental Health Status: No Current Concerns       Chemical Dependency Status:    SW consult for resources. Patient states motivated to quit drinking.  History of ETOH abuse. Alcoholic cirrhosis diagnosisl  CIWA scores are 0 as of 7/4    Values/Beliefs:  Spiritual, Cultural Beliefs, Church Practices, Values that affect care:                 Discussed  Partnership in Safe Discharge Planning  document with patient/family: Yes: patient    Additional Information:    Met with  patient at  bedside.  Lives with a significant other in his house in [place]. Retired, wife is primary contact, son also available.    MNGIs Plan: Ascites-plan paracentesis for diagnostic assessment and therapeutic management     Next Steps: SW to follow for CM needs. Family to transport.    Shaista Cheng RN

## 2025-07-04 NOTE — PROGRESS NOTES
[facility]    Medicine Progress Note - Hospitalist Service    Date of Admission:  7/2/2025    Assessment & Plan                Malcom Chowdhury is a 75 year old male with history of alcoholism, ADHD, mood disorder presented for evaluation of melena found to have variceal bleeding, now status post banding.  New diagnosis of alcoholic cirrhosis. Hospital Day: 3      # Acute upper GI bleed due to new diagnosis of esophageal varices, now status post banding  # Acute blood loss anemia  -Presented with 2 days of melenic stools  -hgb 13.3-->10.8  -INR was 1.6, got vitamin K  - EGD completed 7/3 showing esophageal varices which were banded  -Octreotide drip x72 hrs per GI  -IV PPI  -Okay for full liquid diet per GI    # New diagnosis of alcoholic cirrhosis  # Probably some component of alcoholic hepatitis  #Extensive findings of portal hypertension  -CT abdomen showed:  2.  Cirrhosis with portal venous hypertension, including moderate ascites, numerous portosystemic collaterals, and splenomegaly.  4.  Wall thickening involving the ascending colon and proximal small bowel could be secondary to portal enterocolopathy secondary to underlying liver disease.  5.  Several enlarged gastrohepatic and althea hepatis lymph nodes, possibly reactive.    -MELD-Na of 20  -GI following, initial workup ordered, will need outpatient hepatology followup  -trend LFT, BMP, CBC, INR  -Per GI, not a candidate for steroids due to active GI bleed    # Large volume ascites due to above  -Diagnostic and therapeutic paracentesis 7/3  -SAAG >1.1 c/w portal hypertension  -No growth on culture  -Cell count ~550 mesothelial cells, 0 neutrophils  -on empiric Rocephin x5 days per GI recs  -started Lasix and spironolactone per GI    #9mm liver nodule  -MRI showed 9 mm hyperenhancing nodule in segment 4A of the liver  - Radiologist recommends follow-up MRI in 3 to 6 months    # Alcoholism  -Was intoxicated on admission  -continue CIWA-  last needed protocol triggered diazepam in >24 hrs, likely stop CIWA tomorrow  - consult for resources. Patient states motivated to quit drinking    # Thrombocytopenia  -Possibly due to hepatic dysfunction as above although also could be consumptive related to previous active bleeding    #RLS  - Currently on gabapentin for CIWA protocol which may help this  - Consider continuing gabapentin on discharge if patient interested    # Peripheral edema  - Lymphedema consultation.  I think he will need some lymphedema therapy before he will be able to don his home compression stockings    # New diagnosis of chronic systolic heart failure  -Echocardiogram noting a mildly reduced LVEF 50 to 55%.  No prior echocardiogram available for review  -Suspect possibly due to alcoholism.   - Does not appear grossly volume overloaded right now, does have peripheral edema which is chronic and currently worse likely due to mild hypoalbuminemia as well as noncompliance with compression  -Started on diuretics for ascites per GI recs as above  -recommend followup echo in 1-2 months while sober; if remains abnormal would have him see cardiology for eval    #Heart murmur  #Mild-moderate aortic stenosis  -followup echo as above    #Heartburn  -describes episodic mid sternal burning pain relieved by Tums  -suspect heartburn +/- discomfort related to recent EGD with banding  -continue Tums PRN            Diet: Full Liquid Diet    DVT Prophylaxis: Moderate risk. Pharmacologic prophylaxis contraindicated due to thrombocytopenia. SCDs contraindicated due to 2+ pitting edema  Erazo Catheter: Not present  Lines: None     Cardiac Monitoring: ACTIVE order. Indication: alc withdrawal  Code Status: Full Code      Clinically Significant Risk Factors               # Hypoalbuminemia: Lowest albumin = 2.9 g/dL at 7/4/2025  6:03 AM, will monitor as appropriate    # Coagulation Defect: INR = 1.57 (Ref range: 0.85 - 1.15) and/or PTT = N/A, will monitor for  "bleeding               # Obesity: Estimated body mass index is 37.12 kg/m  as calculated from the following:    Height as of this encounter: 1.753 m (5' 9.02\").    Weight as of this encounter: 114.1 kg (251 lb 8 oz)., PRESENT ON ADMISSION     # Financial/Environmental Concerns: none         Disposition Plan     Medically Ready for Discharge: Anticipated in 2-4 Days         Discharge barrier(s): Octreotide drip, anemia, paracentesis culture results  Care discussed with: patient, RN      Dulce Prieto MD  Hospitalist Service  [facility]  Securely message with [employer] (more info)  Text page via [facility] Paging/Directory   ______________________________________________________________________      Physical Exam   Vital Signs: Temp: 98  F (36.7  C) Temp src: Oral BP: 99/61 Pulse: 72   Resp: 16 SpO2: 94 % O2 Device: Nasal cannula Oxygen Delivery: 1 LPM  Weight: 251 lbs 8 oz    General: in no apparent distress, non-toxic, and alert male lying in hospital bed oriented x3  HEENT: Head normocephalic atraumatic, oral mucosa moist. Sclerae anicteric  Skin: pale.  Numerous spider telangiectasias BLE  Extremities: 2+ pitting edema bilateral ankles  Psych: Normal affect, mildly anxious mood  Neuro: Grossly normal      Medical Decision Making               Data   Recent Results (from the past 16 hours)   CBC with platelets    Collection Time: 07/04/25  6:03 AM   Result Value Ref Range    WBC Count 5.6 4.0 - 11.0 10e3/uL    RBC Count 3.07 (L) 4.40 - 5.90 10e6/uL    Hemoglobin 10.6 (L) 13.3 - 17.7 g/dL    Hematocrit 32.1 (L) 40.0 - 53.0 %     (H) 78 - 100 fL    MCH 34.5 (H) 26.5 - 33.0 pg    MCHC 33.0 31.5 - 36.5 g/dL    RDW 14.2 10.0 - 15.0 %    Platelet Count 139 (L) 150 - 450 10e3/uL   INR    Collection Time: 07/04/25  6:03 AM   Result Value Ref Range    INR 1.57 (H) 0.85 - 1.15    PT 19.0 (H) 11.8 - 14.8 Seconds   Basic metabolic panel    Collection Time: 07/04/25  6:03 AM   Result Value Ref Range    " Sodium 135 135 - 145 mmol/L    Potassium 3.7 3.4 - 5.3 mmol/L    Chloride 100 98 - 107 mmol/L    Carbon Dioxide (CO2) 26 22 - 29 mmol/L    Anion Gap 9 7 - 15 mmol/L    Urea Nitrogen 11.5 8.0 - 23.0 mg/dL    Creatinine 0.70 0.67 - 1.17 mg/dL    GFR Estimate >90 >60 mL/min/1.73m2    Calcium 8.5 (L) 8.8 - 10.4 mg/dL    Glucose 130 (H) 70 - 99 mg/dL   Hepatic function panel    Collection Time: 07/04/25  6:03 AM   Result Value Ref Range    Protein Total 6.1 (L) 6.4 - 8.3 g/dL    Albumin 2.9 (L) 3.5 - 5.2 g/dL    Bilirubin Total 5.9 (H) <=1.2 mg/dL    Alkaline Phosphatase 123 40 - 150 U/L     (H) 0 - 45 U/L    ALT 31 0 - 70 U/L    Bilirubin Direct 3.81 (H) 0.00 - 0.30 mg/dL   Hepatitis B Surface Antibody    Collection Time: 07/04/25  6:03 AM   Result Value Ref Range    Hepatitis B Surface Antibody Nonreactive     Hepatitis B Surface Antibody Instrument Value <3.50 <8.5 m[IU]/mL   Hepatitis B core antibody    Collection Time: 07/04/25  6:03 AM   Result Value Ref Range    Hepatitis B Core Antibody Total Nonreactive Nonreactive   Hepatitis B surface antigen    Collection Time: 07/04/25  6:03 AM   Result Value Ref Range    Hepatitis B Surface Antigen Nonreactive Nonreactive   Hepatitis C antibody    Collection Time: 07/04/25  6:03 AM   Result Value Ref Range    Hepatitis C Antibody Nonreactive Nonreactive   Hepatitis A Antibody Total    Collection Time: 07/04/25  6:03 AM   Result Value Ref Range    Hepatitis A Antibody Total Reactive    Extra Red Top Tube    Collection Time: 07/04/25  6:03 AM   Result Value Ref Range    Hold Specimen JIC        Interval History   Patient is doing fine today. Still a little bit of a scratchy throat and some occasional heartburn well controlled with Tums. Ascites discomfort as much improved. Notes re: peripheral edema, he is not sure if he can get his compression stockings on and will consult lymphedema therapist. Not ready for discharge, remains on octreotide drip. Patient agreeable to  chemical dependency treatment, social work today for resources.

## 2025-07-05 ENCOUNTER — APPOINTMENT (OUTPATIENT)
Dept: PHYSICAL THERAPY | Facility: HOSPITAL | Age: 75
End: 2025-07-05
Attending: HOSPITALIST
Payer: COMMERCIAL

## 2025-07-05 LAB
ALBUMIN SERPL BCG-MCNC: 2.8 G/DL (ref 3.5–5.2)
ALP SERPL-CCNC: 120 U/L (ref 40–150)
ALT SERPL W P-5'-P-CCNC: 30 U/L (ref 0–70)
ANION GAP SERPL CALCULATED.3IONS-SCNC: 7 MMOL/L (ref 7–15)
AST SERPL W P-5'-P-CCNC: 112 U/L (ref 0–45)
BILIRUB SERPL-MCNC: 6.8 MG/DL
BUN SERPL-MCNC: 10 MG/DL (ref 8–23)
CALCIUM SERPL-MCNC: 8.4 MG/DL (ref 8.8–10.4)
CHLORIDE SERPL-SCNC: 101 MMOL/L (ref 98–107)
CREAT SERPL-MCNC: 0.8 MG/DL (ref 0.67–1.17)
EGFRCR SERPLBLD CKD-EPI 2021: >90 ML/MIN/1.73M2
ERYTHROCYTE [DISTWIDTH] IN BLOOD BY AUTOMATED COUNT: 13.9 % (ref 10–15)
GLUCOSE BLDC GLUCOMTR-MCNC: 102 MG/DL (ref 70–99)
GLUCOSE BLDC GLUCOMTR-MCNC: 192 MG/DL (ref 70–99)
GLUCOSE SERPL-MCNC: 141 MG/DL (ref 70–99)
HCO3 SERPL-SCNC: 26 MMOL/L (ref 22–29)
HCT VFR BLD AUTO: 31.8 % (ref 40–53)
HGB BLD-MCNC: 10.6 G/DL (ref 13.3–17.7)
INR PPP: 1.54 (ref 0.85–1.15)
MCH RBC QN AUTO: 35 PG (ref 26.5–33)
MCHC RBC AUTO-ENTMCNC: 33.3 G/DL (ref 31.5–36.5)
MCV RBC AUTO: 105 FL (ref 78–100)
PLATELET # BLD AUTO: 136 10E3/UL (ref 150–450)
POTASSIUM SERPL-SCNC: 4.1 MMOL/L (ref 3.4–5.3)
PROT SERPL-MCNC: 5.9 G/DL (ref 6.4–8.3)
PROTHROMBIN TIME: 18.7 SECONDS (ref 11.8–14.8)
RBC # BLD AUTO: 3.03 10E6/UL (ref 4.4–5.9)
SODIUM SERPL-SCNC: 134 MMOL/L (ref 135–145)
WBC # BLD AUTO: 6.2 10E3/UL (ref 4–11)

## 2025-07-05 PROCEDURE — 97535 SELF CARE MNGMENT TRAINING: CPT | Mod: GP | Performed by: PHYSICAL THERAPIST

## 2025-07-05 PROCEDURE — 250N000013 HC RX MED GY IP 250 OP 250 PS 637: Performed by: STUDENT IN AN ORGANIZED HEALTH CARE EDUCATION/TRAINING PROGRAM

## 2025-07-05 PROCEDURE — 99232 SBSQ HOSP IP/OBS MODERATE 35: CPT | Performed by: HOSPITALIST

## 2025-07-05 PROCEDURE — 250N000011 HC RX IP 250 OP 636: Performed by: HOSPITALIST

## 2025-07-05 PROCEDURE — 250N000011 HC RX IP 250 OP 636: Performed by: STUDENT IN AN ORGANIZED HEALTH CARE EDUCATION/TRAINING PROGRAM

## 2025-07-05 PROCEDURE — 250N000013 HC RX MED GY IP 250 OP 250 PS 637: Performed by: HOSPITALIST

## 2025-07-05 PROCEDURE — 97161 PT EVAL LOW COMPLEX 20 MIN: CPT | Mod: GP | Performed by: PHYSICAL THERAPIST

## 2025-07-05 PROCEDURE — 120N000001 HC R&B MED SURG/OB

## 2025-07-05 PROCEDURE — 36415 COLL VENOUS BLD VENIPUNCTURE: CPT | Performed by: PHYSICIAN ASSISTANT

## 2025-07-05 PROCEDURE — 84155 ASSAY OF PROTEIN SERUM: CPT | Performed by: PHYSICIAN ASSISTANT

## 2025-07-05 PROCEDURE — 85027 COMPLETE CBC AUTOMATED: CPT | Performed by: HOSPITALIST

## 2025-07-05 PROCEDURE — 85610 PROTHROMBIN TIME: CPT | Performed by: PHYSICIAN ASSISTANT

## 2025-07-05 RX ORDER — FOLIC ACID 1 MG/1
1 TABLET ORAL DAILY
Status: DISCONTINUED | OUTPATIENT
Start: 2025-07-06 | End: 2025-07-07 | Stop reason: HOSPADM

## 2025-07-05 RX ADMIN — CLONIDINE HYDROCHLORIDE 0.1 MG: 0.1 TABLET ORAL at 03:07

## 2025-07-05 RX ADMIN — Medication 1 TABLET: at 08:57

## 2025-07-05 RX ADMIN — CEFTRIAXONE SODIUM 2 G: 2 INJECTION, POWDER, FOR SOLUTION INTRAMUSCULAR; INTRAVENOUS at 20:05

## 2025-07-05 RX ADMIN — PANTOPRAZOLE SODIUM 40 MG: 40 INJECTION, POWDER, FOR SOLUTION INTRAVENOUS at 07:54

## 2025-07-05 RX ADMIN — GABAPENTIN 900 MG: 300 CAPSULE ORAL at 17:15

## 2025-07-05 RX ADMIN — CALCIUM CARBONATE (ANTACID) CHEW TAB 500 MG 1000 MG: 500 CHEW TAB at 03:15

## 2025-07-05 RX ADMIN — SPIRONOLACTONE 25 MG: 25 TABLET, FILM COATED ORAL at 08:56

## 2025-07-05 RX ADMIN — GABAPENTIN 900 MG: 300 CAPSULE ORAL at 03:09

## 2025-07-05 RX ADMIN — GABAPENTIN 900 MG: 300 CAPSULE ORAL at 08:56

## 2025-07-05 RX ADMIN — FOLIC ACID 1 MG: 5 INJECTION, SOLUTION INTRAMUSCULAR; INTRAVENOUS; SUBCUTANEOUS at 08:58

## 2025-07-05 RX ADMIN — THIAMINE HCL TAB 100 MG 100 MG: 100 TAB at 08:57

## 2025-07-05 RX ADMIN — FUROSEMIDE 20 MG: 20 TABLET ORAL at 08:57

## 2025-07-05 RX ADMIN — Medication 1 CAPSULE: at 10:15

## 2025-07-05 ASSESSMENT — ACTIVITIES OF DAILY LIVING (ADL)
ADLS_ACUITY_SCORE: 34
ADLS_ACUITY_SCORE: 38
ADLS_ACUITY_SCORE: 38
ADLS_ACUITY_SCORE: 34
ADLS_ACUITY_SCORE: 38
ADLS_ACUITY_SCORE: 42
ADLS_ACUITY_SCORE: 38
ADLS_ACUITY_SCORE: 34
ADLS_ACUITY_SCORE: 34
ADLS_ACUITY_SCORE: 38
ADLS_ACUITY_SCORE: 42
ADLS_ACUITY_SCORE: 34
ADLS_ACUITY_SCORE: 38
ADLS_ACUITY_SCORE: 34
ADLS_ACUITY_SCORE: 34
ADLS_ACUITY_SCORE: 38
ADLS_ACUITY_SCORE: 38

## 2025-07-05 NOTE — PROGRESS NOTES
"  GASTROENTEROLOGY PROGRESS NOTE     SUBJECTIVE   No overnight complaints.  Paracentesis went well and states it improved some of his distention.     OBJECTIVE     Vitals Blood pressure 99/57, pulse 71, temperature 98.2  F (36.8  C), temperature source Oral, resp. rate 18, height 1.753 m (5' 9.02\"), weight 114.1 kg (251 lb 8 oz), SpO2 93%.          Physical Exam  General: awake, alert, oriented times three   Cardiovascular: RRR, 3+ LE edema, systolic murmur   Chest: lungs are clear to auscultation bilaterally   Abdomen: soft, non-tender, mild distension, bowel sounds present   Neurologic: grossly intact        LABORATORY    ELECTROLYTE PANEL   Recent Labs   Lab 07/05/25  0634 07/04/25  2059 07/04/25  1700 07/04/25  0603 07/03/25  1242 07/03/25  0635   *  --   --  135  --  135   POTASSIUM 4.1  --   --  3.7  --  3.8   CHLORIDE 101  --   --  100  --  99   CO2 26  --   --  26  --  23   * 101* 123* 130*   < > 153*   CR 0.80  --   --  0.70  --  0.63*   BUN 10.0  --   --  11.5  --  11.0    < > = values in this interval not displayed.      HEMATOLOGY PANEL   Recent Labs   Lab 07/05/25  0634 07/04/25  0603 07/03/25  0635   HGB 10.6* 10.6* 10.8*   * 105* 101*   WBC 6.2 5.6 6.4   * 139* 144*   INR 1.54* 1.57* 1.68*      LIVER AND PANCREAS PANEL   Recent Labs   Lab 07/05/25  0634 07/04/25  0603 07/03/25  0635   * 119* 120*   ALT 30 31 33   ALKPHOS 120 123 133   BILITOTAL 6.8* 5.9* 5.3*     IMAGING STUDIES        I have reviewed the current diagnostic and laboratory tests.              IMPRESSION   Cirrhosis  75-year-old male with history of alcohol use disorder, new diagnosis of cirrhosis complicated by ascites, portal hypertension presenting with melena and found to have large esophageal varices status post banding 7/3/2025.  MELD 20 today.  A. Esophageal varices  Status post banding 7/3/2025.  He will need repeat banding in 1 month.  Hemoglobin 10.6.  Octreotide for 72 hours, can discharge " tomorrow.  B.  Ascites  Status post paracentesis 7/3/2025 with 2.1 L removed.  Negative for SBP.  He is on ceftriaxone for SBP prophylaxis given his concurrent GI bleeding.  Creatinine normal.  BP stable.  Will start diuretics.     2. Liver nodule  Seen on CTA and then follow-up liver MR 7/3/25. LI-RADs 3. Repeat imaging in 3-6 months.            Plan:   1.  Continue octreotide for total of 72 hours  2.  5-day course of ceftriaxone or similar oral medication for SBP prophylaxis (GI bleed in setting of cirrhosis)  3.  Spironolactone 50 Mg, furosemide 20 Mg daily  4.  Trend MELD labs daily  5.  EGD in 1 month for variceal surveillance.  Our office will arrange.  6.  Follow-up in [facility] hepatology clinic.  Our office will arrange.  7.  Repeat Imaging liver in 3-6 months. Our office will arrange.   8. Recommend complete alcohol cessation.  Social work to see this admission and give options for treatment.  He tells me he is motivated to quit drinking.    GI will sign off.  Please call with any questions or concerns.  His follow-up has been arranged                Alex Kaplan MD  Thank you for the opportunity to participate in the care of this patient.   Please feel free to call me with any questions or concerns.  Phone number [phone].

## 2025-07-05 NOTE — PLAN OF CARE
Goal Outcome Evaluation:              Goal: Improved Sleep  Outcome: Progressing   Pt reported poor sleep last night, taking a nap at 1300 today. Pt is independent in the room, alert and oriented x 4.  Telemetry discontinued this afternoon per order.    Problem: Alcohol Withdrawal  Goal: Alcohol Withdrawal Symptom Control  Outcome: Progressing  Intervention: Minimize or Manage Alcohol Withdrawal Symptoms  Recent Flowsheet Documentation  Taken 7/5/2025 0900 by Milady Valdes RN  Sensory Stimulation Regulation: television on     Pt's CIWA scores today zero, MD discontinued CIWA protocol    Problem: Gastrointestinal Bleeding  Goal: Optimal Coping with Acute Illness  Outcome: Progressing      Stool is brown, pt now on metamucil, had been involuntary and loose prior, will monitor.  GI advanced diet to 2 gram NA today, pt tolerated breakfast, unable to eat all of his lunch, chicken was too tough, pt reported difficulty swallowing harder food.  Writer discussed with wife at bedside and patient to choose softer food that is easier to chew and swallow, had banding procedure.      Problem: Fluid Volume Excess  Goal: Fluid Balance  Outcome: Progressing   Pt started lymphadema wraps this am, writer had to hold spironolactone and lasix this am based on bp parameters. (See flowsheet)  MD cancelled scheduled clonidine going forward, writer able to give lasix and spironolactone after lunch after bp WNL.  Writer explained rationale for new meds, diuretics.        Spoke with pharmacy this afternoon, octreotide to be discontinued today at 1830, will update patient when awake from his nap.

## 2025-07-05 NOTE — PLAN OF CARE
Problem: Alcohol Withdrawal  Goal: Alcohol Withdrawal Symptom Control  Outcome: Progressing  Intervention: Minimize or Manage Alcohol Withdrawal Symptoms  Recent Flowsheet Documentation  Taken 7/5/2025 0045 by Judy Martell RN  Sensory Stimulation Regulation:   lighting decreased   care clustered  Goal: Optimal Neurologic Function  Outcome: Progressing  Intervention: Minimize or Manage Acute Neurologic Symptoms  Recent Flowsheet Documentation  Taken 7/5/2025 0045 by Judy Martell RN  Sensory Stimulation Regulation:   lighting decreased   care clustered  Goal: Readiness for Change Identified  Outcome: Progressing     Problem: Nausea and Vomiting  Goal: Nausea and Vomiting Relief  Outcome: Progressing     Problem: Infection  Goal: Absence of Infection Signs and Symptoms  Outcome: Progressing   Goal Outcome Evaluation:    Patient is A/O x44. VSS on RA.  Denies pain; CIWA: 0,0; independent in room; makes needs known; bilateral LE edema; no stools today; sandostatin @ 50 ml/hr

## 2025-07-05 NOTE — CONSULTS
NUTRITION EDUCATION      REASON FOR ASSESSMENT:  Consult: Low salt diet     NUTRITION HISTORY:  Information obtained from pt    Wife familiar with a low salt diet however pt is not. Knows he needs to start reading food labels, and stop adding salt to his food. Has started using a salt free seasoning from  [employer] and has been getting away from processed foods.    CURRENT DIET:  2 gm sodium    NUTRITION DIAGNOSIS:  Food- and nutrition-related knowledge deficit R/t cirrhosis and ascites AEB need for a low sodium diet.    INTERVENTIONS:    Nutrition Prescription:  2 gm sodium    Implementation:      *  Nutrition Education (Content):   A)  Provided handout low sodium nutrition therapy, salt free seasoning, label reading   B)  Discussed foods allowed, foods to avoid, label reading      *  Nutrition Education (Application):   A)  Discussed current eating habits and recommended alternative food choices      *  Anticipate good compliance      *  Diet Education - refer to Education Flowsheet    Goals:      *  Patient will verbalize understanding of diet       *  All of the above goals met during the education session    Follow Up/Monitoring:         *  Recommended Out-Patient Nutrition Referral, if further diet instructions are needed

## 2025-07-05 NOTE — PROGRESS NOTES
[facility]    Medicine Progress Note - Hospitalist Service    Date of Admission:  7/2/2025    Assessment & Plan                Malcom Chowdhury is a 75 year old male with history of alcoholism, ADHD, mood disorder presented for evaluation of melena found to have variceal bleeding, now status post banding.  New diagnosis of alcoholic cirrhosis. Hospital Day: 4      # Acute upper GI bleed due to new diagnosis of esophageal varices, now status post banding  # Acute blood loss anemia  -Presented with 2 days of melenic stools  -hgb 13.3-->10.8  -INR was 1.6, got vitamin K  - EGD completed 7/3 showing esophageal varices which were banded  -Octreotide drip x72 hrs per GI- ends tonight  -ok to stop PPI per GI  -Okay for low salt diet per GI    # New diagnosis of alcoholic cirrhosis  # Probably some component of alcoholic hepatitis  #Extensive findings of portal hypertension  -CT abdomen showed:  2.  Cirrhosis with portal venous hypertension, including moderate ascites, numerous portosystemic collaterals, and splenomegaly.  4.  Wall thickening involving the ascending colon and proximal small bowel could be secondary to portal enterocolopathy secondary to underlying liver disease.  5.  Several enlarged gastrohepatic and lathea hepatis lymph nodes, possibly reactive.    -MELD-Na of 20  -GI following, initial workup ordered, will need outpatient hepatology followup  -trend LFT, BMP, CBC, INR. Bilirubin up slightly today  -Per GI, not a candidate for steroids due to active GI bleed    # Large volume ascites due to above  -Diagnostic and therapeutic paracentesis 7/3  -SAAG >1.1 c/w portal hypertension  -No growth on culture  -Cell count ~550 mesothelial cells, 0 neutrophils  -on empiric Rocephin x5 days per GI recs  -started Lasix and spironolactone per GI    #9mm liver nodule  -MRI showed 9 mm hyperenhancing nodule in segment 4A of the liver  - Radiologist recommends follow-up MRI in 3 to 6 months    #  Alcoholism  -Was intoxicated on admission  -stop CIWA today  - consult for resources. Patient states motivated to quit drinking  -can stop clonidine    # Thrombocytopenia  -Possibly due to hepatic dysfunction as above although also could be consumptive related to previous active bleeding    #RLS  - Currently on gabapentin for CIWA protocol which seems to be helping this  - would continue gabapentin on discharge    # Peripheral edema  - continue Lymphedema therapy.  I think he will need additionl lymphedema therapy before he will be able to don his home compression stockings    # New diagnosis of chronic systolic heart failure  -Echocardiogram noting a mildly reduced LVEF 50 to 55%.  No prior echocardiogram available for review  -Suspect possibly due to alcoholism.   - Does not appear grossly volume overloaded right now, does have peripheral edema which is chronic and currently worse likely due to mild hypoalbuminemia as well as noncompliance with compression  -Started on diuretics for ascites per GI recs as above  -recommend followup echo in 1-2 months while sober; if remains abnormal would have him see cardiology for eval    #Heart murmur  #Mild-moderate aortic stenosis  -followup echo as above    #Heartburn  -describes episodic mid sternal burning pain relieved by Tums  -suspect heartburn +/- discomfort related to recent EGD with banding  -continue Tums PRN            Diet: 2 Gram Sodium Diet    DVT Prophylaxis: Moderate risk. Pharmacologic prophylaxis contraindicated due to thrombocytopenia. SCDs contraindicated due to 2+ pitting edema  Erazo Catheter: Not present  Lines: None     Cardiac Monitoring: ACTIVE order. Indication: alc withdrawal  Code Status: Full Code      Clinically Significant Risk Factors         # Hyponatremia: Lowest Na = 134 mmol/L in last 2 days, will monitor as appropriate       # Hypoalbuminemia: Lowest albumin = 2.8 g/dL at 7/5/2025  6:34 AM, will monitor as appropriate    # Coagulation  "Defect: INR = 1.54 (Ref range: 0.85 - 1.15) and/or PTT = N/A, will monitor for bleeding               # Obesity: Estimated body mass index is 37.12 kg/m  as calculated from the following:    Height as of this encounter: 1.753 m (5' 9.02\").    Weight as of this encounter: 114.1 kg (251 lb 8 oz)., PRESENT ON ADMISSION     # Financial/Environmental Concerns: none         Disposition Plan     Medically Ready for Discharge: Anticipated Tomorrow         Discharge barrier(s): rising bilirubin, chem dep plan  Care discussed with: patient, RN      Dulce Prieto MD  Hospitalist Service  [facility]  Securely message with [employer] (more info)  Text page via [employer] Paging/Directory   ______________________________________________________________________      Physical Exam   Vital Signs: Temp: 98.5  F (36.9  C) Temp src: Oral BP: 114/71 Pulse: 69   Resp: 16 SpO2: 93 % O2 Device: None (Room air) Oxygen Delivery: 1 LPM  Weight: 251 lbs 8 oz    General: in no apparent distress, non-toxic, and alert male lying in hospital bed oriented x3  HEENT: Head normocephalic atraumatic, oral mucosa moist. Sclerae anicteric  Skin: pale.  Numerous spider telangiectasias BLE  Extremities: 2+ pitting edema bilateral ankles  Psych: Normal affect, mildly anxious mood  Neuro: Grossly normal      Medical Decision Making               Data   Recent Results (from the past 16 hours)   INR    Collection Time: 07/05/25  6:34 AM   Result Value Ref Range    INR 1.54 (H) 0.85 - 1.15    PT 18.7 (H) 11.8 - 14.8 Seconds   Comprehensive metabolic panel    Collection Time: 07/05/25  6:34 AM   Result Value Ref Range    Sodium 134 (L) 135 - 145 mmol/L    Potassium 4.1 3.4 - 5.3 mmol/L    Carbon Dioxide (CO2) 26 22 - 29 mmol/L    Anion Gap 7 7 - 15 mmol/L    Urea Nitrogen 10.0 8.0 - 23.0 mg/dL    Creatinine 0.80 0.67 - 1.17 mg/dL    GFR Estimate >90 >60 mL/min/1.73m2    Calcium 8.4 (L) 8.8 - 10.4 mg/dL    Chloride 101 98 - 107 mmol/L    Glucose " 141 (H) 70 - 99 mg/dL    Alkaline Phosphatase 120 40 - 150 U/L     (H) 0 - 45 U/L    ALT 30 0 - 70 U/L    Protein Total 5.9 (L) 6.4 - 8.3 g/dL    Albumin 2.8 (L) 3.5 - 5.2 g/dL    Bilirubin Total 6.8 (H) <=1.2 mg/dL   CBC with platelets    Collection Time: 07/05/25  6:34 AM   Result Value Ref Range    WBC Count 6.2 4.0 - 11.0 10e3/uL    RBC Count 3.03 (L) 4.40 - 5.90 10e6/uL    Hemoglobin 10.6 (L) 13.3 - 17.7 g/dL    Hematocrit 31.8 (L) 40.0 - 53.0 %     (H) 78 - 100 fL    MCH 35.0 (H) 26.5 - 33.0 pg    MCHC 33.3 31.5 - 36.5 g/dL    RDW 13.9 10.0 - 15.0 %    Platelet Count 136 (L) 150 - 450 10e3/uL   Glucose by meter    Collection Time: 07/05/25 11:23 AM   Result Value Ref Range    GLUCOSE BY METER POCT 192 (H) 70 - 99 mg/dL       Interval History   Patient states doing pretty well today. He is looking forward to eating solid food, G.I. provider has advanced his diet today. Will consult nutrition for education about low salt diet as this is new to him. Patient has had much relief from his restless legs, suspect this is due to gabapentin and would plan to continue him on this medication, patient is very appreciative. Patient has lymphedema wraps in place. Social work still not provided him with chemical dependency resources, informed care manager, who will reach out to social work. Patient is anxious for a good plan for this, which is very appropriate. I did strongly encourage inpatient chemical dependency treatment due to the severity of his liver disease, and the fact that this is his first attempt at treatment and sobriety. I think we need to give him all the tools that we can. Anticipate ocreotide drip can stop today. Discontinued clonidine due to lack of anxiety from withdrawal. Will also discontinue ciwa scoring. BiliRubin still rising, will recheck tomorrow.

## 2025-07-06 ENCOUNTER — APPOINTMENT (OUTPATIENT)
Dept: PHYSICAL THERAPY | Facility: HOSPITAL | Age: 75
End: 2025-07-06
Attending: HOSPITALIST
Payer: COMMERCIAL

## 2025-07-06 LAB
ALBUMIN SERPL BCG-MCNC: 2.8 G/DL (ref 3.5–5.2)
ALP SERPL-CCNC: 113 U/L (ref 40–150)
ALT SERPL W P-5'-P-CCNC: 33 U/L (ref 0–70)
ANION GAP SERPL CALCULATED.3IONS-SCNC: 6 MMOL/L (ref 7–15)
AST SERPL W P-5'-P-CCNC: 114 U/L (ref 0–45)
BILIRUB SERPL-MCNC: 9.5 MG/DL
BUN SERPL-MCNC: 8.9 MG/DL (ref 8–23)
CALCIUM SERPL-MCNC: 8.7 MG/DL (ref 8.8–10.4)
CHLORIDE SERPL-SCNC: 100 MMOL/L (ref 98–107)
CREAT SERPL-MCNC: 0.71 MG/DL (ref 0.67–1.17)
EGFRCR SERPLBLD CKD-EPI 2021: >90 ML/MIN/1.73M2
ERYTHROCYTE [DISTWIDTH] IN BLOOD BY AUTOMATED COUNT: 14 % (ref 10–15)
GLUCOSE BLDC GLUCOMTR-MCNC: 131 MG/DL (ref 70–99)
GLUCOSE BLDC GLUCOMTR-MCNC: 142 MG/DL (ref 70–99)
GLUCOSE SERPL-MCNC: 117 MG/DL (ref 70–99)
HCO3 SERPL-SCNC: 28 MMOL/L (ref 22–29)
HCT VFR BLD AUTO: 32.4 % (ref 40–53)
HGB BLD-MCNC: 10.9 G/DL (ref 13.3–17.7)
INR PPP: 1.78 (ref 0.85–1.15)
MCH RBC QN AUTO: 34.8 PG (ref 26.5–33)
MCHC RBC AUTO-ENTMCNC: 33.6 G/DL (ref 31.5–36.5)
MCV RBC AUTO: 104 FL (ref 78–100)
PLATELET # BLD AUTO: 146 10E3/UL (ref 150–450)
POTASSIUM SERPL-SCNC: 3.6 MMOL/L (ref 3.4–5.3)
PROT SERPL-MCNC: 5.9 G/DL (ref 6.4–8.3)
PROTHROMBIN TIME: 20.9 SECONDS (ref 11.8–14.8)
RBC # BLD AUTO: 3.13 10E6/UL (ref 4.4–5.9)
SODIUM SERPL-SCNC: 134 MMOL/L (ref 135–145)
WBC # BLD AUTO: 6.5 10E3/UL (ref 4–11)

## 2025-07-06 PROCEDURE — 85014 HEMATOCRIT: CPT | Performed by: HOSPITALIST

## 2025-07-06 PROCEDURE — 250N000011 HC RX IP 250 OP 636: Performed by: HOSPITALIST

## 2025-07-06 PROCEDURE — 82310 ASSAY OF CALCIUM: CPT | Performed by: HOSPITALIST

## 2025-07-06 PROCEDURE — 97535 SELF CARE MNGMENT TRAINING: CPT | Mod: GP | Performed by: PHYSICAL THERAPIST

## 2025-07-06 PROCEDURE — 250N000013 HC RX MED GY IP 250 OP 250 PS 637: Performed by: HOSPITALIST

## 2025-07-06 PROCEDURE — 99232 SBSQ HOSP IP/OBS MODERATE 35: CPT | Performed by: HOSPITALIST

## 2025-07-06 PROCEDURE — 85610 PROTHROMBIN TIME: CPT | Performed by: HOSPITALIST

## 2025-07-06 PROCEDURE — 120N000001 HC R&B MED SURG/OB

## 2025-07-06 PROCEDURE — 36415 COLL VENOUS BLD VENIPUNCTURE: CPT | Performed by: HOSPITALIST

## 2025-07-06 RX ADMIN — Medication 1 CAPSULE: at 09:57

## 2025-07-06 RX ADMIN — FOLIC ACID 1 MG: 1 TABLET ORAL at 09:58

## 2025-07-06 RX ADMIN — Medication 1 TABLET: at 09:57

## 2025-07-06 RX ADMIN — GABAPENTIN 900 MG: 300 CAPSULE ORAL at 00:32

## 2025-07-06 RX ADMIN — FUROSEMIDE 20 MG: 20 TABLET ORAL at 09:58

## 2025-07-06 RX ADMIN — GABAPENTIN 600 MG: 300 CAPSULE ORAL at 17:15

## 2025-07-06 RX ADMIN — SPIRONOLACTONE 25 MG: 25 TABLET, FILM COATED ORAL at 09:58

## 2025-07-06 RX ADMIN — CEFTRIAXONE SODIUM 2 G: 2 INJECTION, POWDER, FOR SOLUTION INTRAMUSCULAR; INTRAVENOUS at 19:43

## 2025-07-06 RX ADMIN — THIAMINE HCL TAB 100 MG 100 MG: 100 TAB at 09:58

## 2025-07-06 RX ADMIN — GABAPENTIN 600 MG: 300 CAPSULE ORAL at 09:57

## 2025-07-06 ASSESSMENT — ACTIVITIES OF DAILY LIVING (ADL)
ADLS_ACUITY_SCORE: 42
ADLS_ACUITY_SCORE: 38
ADLS_ACUITY_SCORE: 42
ADLS_ACUITY_SCORE: 38
ADLS_ACUITY_SCORE: 42
ADLS_ACUITY_SCORE: 38
ADLS_ACUITY_SCORE: 42

## 2025-07-06 NOTE — PLAN OF CARE
Goal Outcome Evaluation:           Problem: Adult Inpatient Plan of Care  Goal: Absence of Hospital-Acquired Illness or Injury  Outcome: Progressing  Intervention: Identify and Manage Fall Risk  Recent Flowsheet Documentation  Taken 7/6/2025 0032 by Vivi Mckeon RN  Safety Promotion/Fall Prevention:   assistive device/personal items within reach   clutter free environment maintained   nonskid shoes/slippers when out of bed   patient and family education   room organization consistent   safety round/check completed  Intervention: Prevent Skin Injury  Recent Flowsheet Documentation  Taken 7/6/2025 0032 by Vivi Mckeon RN  Body Position:   position changed independently   supine, head elevated  Taken 7/5/2025 2100 by Vivi Mckeon RN  Body Position: position changed independently  Intervention: Prevent Infection  Recent Flowsheet Documentation  Taken 7/6/2025 0032 by Vivi Mckeon RN  Infection Prevention:   hand hygiene promoted   rest/sleep promoted   single patient room provided     Problem: Delirium  Goal: Improved Attention and Thought Clarity  Outcome: Progressing  Intervention: Maximize Cognitive Function  Recent Flowsheet Documentation  Taken 7/6/2025 0032 by Vivi Mckeon RN  Sensory Stimulation Regulation:   television on   care clustered  Reorientation Measures:   calendar in view   clock in view     Problem: Delirium  Goal: Improved Sleep  Outcome: Progressing       Pt AxO x4, VSS on RA, denies pain IND in room, c/o loose stools tonight had 3 during evening. Slept through night, No significant events

## 2025-07-06 NOTE — PLAN OF CARE
Goal Outcome Evaluation:      Plan of Care Reviewed With: patient    Overall Patient Progress: improving    Outcome Evaluation: Patient alert and oriented, able to make needs known, up ad shayna      Problem: Adult Inpatient Plan of Care  Goal: Readiness for Transition of Care  Outcome: Progressing     Problem: Alcohol Withdrawal  Goal: Alcohol Withdrawal Symptom Control  Outcome: Progressing     Problem: Alcohol Withdrawal  Goal: Optimal Neurologic Function  Outcome: Progressing     Writer assumed care of patient at 2100. Patient is alert and oriented, able to make needs known, independent in room. Patient denying pain. Lymph wraps in place to BLE.     Zander Charles, RN 7/5/2025   [id number]

## 2025-07-06 NOTE — PROGRESS NOTES
Care Management Follow Up    Length of Stay (days): 4    Expected Discharge Date: 07/07/2025     Concerns to be Addressed: substance/tobacco abuse/use, discharge planning     Patient plan of care discussed at interdisciplinary rounds: Yes    Anticipated Discharge Disposition:  Patient is hoping to be linked with an intensive OP CD treatment program prior to discharge. Formal CD consult requested.              Anticipated Discharge Services:  CD trx  Anticipated Discharge DME:  lymphedema wraps    Patient/family educated on Medicare website which has current facility and service quality ratings:  NA  Education Provided on the Discharge Plan:  plan TBD  Patient/Family in Agreement with the Plan:  patient agrees with CD trx    Referrals Placed by CM/SW:  resources given  Private pay costs discussed: Not applicable    Discussed  Partnership in Safe Discharge Planning  document with patient/family: No      Additional Information:  Patient given resources for CD treatment. He would like to be linked with an intensive OP treatment program prior to discharge. CD consult requested. He would be open to a home care referral for lymphedema wraps if medically needed. He lives with wife and she will provide transport as needed.    Next Steps: MIAH to follow.    YARELY Gonzalez

## 2025-07-06 NOTE — PROGRESS NOTES
[facility]    Medicine Progress Note - Hospitalist Service    Date of Admission:  7/2/2025    Assessment & Plan                Malcom Chowdhury is a 75 year old male with history of alcoholism, ADHD, mood disorder presented for evaluation of melena found to have variceal bleeding, now status post banding.  New diagnosis of alcoholic cirrhosis. Hospital Day: 5      # Acute upper GI bleed due to new diagnosis of esophageal varices, now status post banding  # Acute blood loss anemia  -Presented with 2 days of melenic stools  -hgb 13.3-->10.8  -INR was 1.6, got vitamin K  - EGD completed 7/3 showing esophageal varices which were banded  -Octreotide drip x72 hrs completed  -tolerating diet    # New diagnosis of alcoholic cirrhosis  # Probably some component of alcoholic hepatitis  #Extensive findings of portal hypertension  -CT abdomen showed:  2.  Cirrhosis with portal venous hypertension, including moderate ascites, numerous portosystemic collaterals, and splenomegaly.  4.  Wall thickening involving the ascending colon and proximal small bowel could be secondary to portal enterocolopathy secondary to underlying liver disease.  5.  Several enlarged gastrohepatic and althea hepatis lymph nodes, possibly reactive.    -MELD-Na of 20  -GI following, initial workup ordered, will need outpatient hepatology followup  -trend LFT, BMP, CBC, INR. Bilirubin & INR up slightly today  -Per GI, steroid not indicated    # Large volume ascites due to above  -Diagnostic and therapeutic paracentesis 7/3  -SAAG >1.1 c/w portal hypertension  -No growth on culture  -Cell count ~550 mesothelial cells, 0 neutrophils  -on empiric Rocephin x5 days per GI recs  -started Lasix and spironolactone per GI    #9mm liver nodule  -MRI showed 9 mm hyperenhancing nodule in segment 4A of the liver  - Radiologist recommends follow-up MRI in 3 to 6 months    # Alcoholism  -Was intoxicated on admission  -off CIWA, did not have severe  withdrawal  - consult for resources. Patient states motivated to quit drinking  -can stop clonidine    # Thrombocytopenia  -Possibly due to hepatic dysfunction as above although also could be consumptive related to previous active bleeding    #RLS  - Currently on gabapentin for CIWA protocol which seems to be helping this  - would continue gabapentin on discharge    # Peripheral edema  - continue Lymphedema therapy.  I think he will need additionl lymphedema therapy before he will be able to don his home compression stockings  -follow up with his regular Vascular provider outpatient    # New diagnosis of chronic systolic heart failure  -Echocardiogram noting a mildly reduced LVEF 50 to 55%.  No prior echocardiogram available for review  -Suspect possibly due to alcoholism.   - Does not appear grossly volume overloaded right now, does have peripheral edema which is chronic and currently worse likely due to mild hypoalbuminemia as well as noncompliance with compression  -Started on diuretics for ascites per GI recs as above  -recommend followup echo in 1-2 months while sober; if remains abnormal would have him see cardiology for eval    #Heart murmur  #Mild-moderate aortic stenosis  -followup echo as above    #Heartburn  -describes episodic mid sternal burning pain relieved by Tums  -suspect heartburn +/- discomfort related to recent EGD with banding  -continue Tums PRN            Diet: 2 Gram Sodium Diet    DVT Prophylaxis: Moderate risk. Pharmacologic prophylaxis contraindicated due to thrombocytopenia. SCDs contraindicated due to 2+ pitting edema  Erazo Catheter: Not present  Lines: None     Cardiac Monitoring: None  Code Status: Full Code      Clinically Significant Risk Factors         # Hyponatremia: Lowest Na = 134 mmol/L in last 2 days, will monitor as appropriate       # Hypoalbuminemia: Lowest albumin = 2.8 g/dL at 7/6/2025  6:47 AM, will monitor as appropriate    # Coagulation Defect: INR = 1.78 (Ref  "range: 0.85 - 1.15) and/or PTT = N/A, will monitor for bleeding               # Obesity: Estimated body mass index is 37.39 kg/m  as calculated from the following:    Height as of this encounter: 1.753 m (5' 9.02\").    Weight as of this encounter: 114.9 kg (253 lb 5 oz)., PRESENT ON ADMISSION     # Financial/Environmental Concerns: none         Disposition Plan     Medically Ready for Discharge: Anticipated Tomorrow         Discharge barrier(s): rising bilirubin&INR, chem dep plan  Care discussed with: patient, RN      Dulce Prieto MD  Hospitalist Service  [facility]  Securely message with [employer] (more info)  Text page via [employer] Paging/Directory   ______________________________________________________________________      Physical Exam   Vital Signs: Temp: 98  F (36.7  C) Temp src: Oral BP: 118/74 Pulse: 76   Resp: 20 SpO2: 94 % O2 Device: None (Room air)    Weight: 253 lbs 5 oz    General: in no apparent distress, non-toxic, and alert male lying in hospital bed oriented x3  HEENT: Head normocephalic atraumatic, oral mucosa moist. Sclerae icteric  Skin: pale.  jaundiced  Extremities: 2+ pitting edema bilateral ankles  Psych: Normal affect, mildly anxious mood  Neuro: Grossly normal      Medical Decision Making               Data   Recent Results (from the past 16 hours)   CBC with platelets    Collection Time: 07/06/25  6:47 AM   Result Value Ref Range    WBC Count 6.5 4.0 - 11.0 10e3/uL    RBC Count 3.13 (L) 4.40 - 5.90 10e6/uL    Hemoglobin 10.9 (L) 13.3 - 17.7 g/dL    Hematocrit 32.4 (L) 40.0 - 53.0 %     (H) 78 - 100 fL    MCH 34.8 (H) 26.5 - 33.0 pg    MCHC 33.6 31.5 - 36.5 g/dL    RDW 14.0 10.0 - 15.0 %    Platelet Count 146 (L) 150 - 450 10e3/uL   INR    Collection Time: 07/06/25  6:47 AM   Result Value Ref Range    INR 1.78 (H) 0.85 - 1.15    PT 20.9 (H) 11.8 - 14.8 Seconds   Comprehensive metabolic panel    Collection Time: 07/06/25  6:47 AM   Result Value Ref Range    " Sodium 134 (L) 135 - 145 mmol/L    Potassium 3.6 3.4 - 5.3 mmol/L    Carbon Dioxide (CO2) 28 22 - 29 mmol/L    Anion Gap 6 (L) 7 - 15 mmol/L    Urea Nitrogen 8.9 8.0 - 23.0 mg/dL    Creatinine 0.71 0.67 - 1.17 mg/dL    GFR Estimate >90 >60 mL/min/1.73m2    Calcium 8.7 (L) 8.8 - 10.4 mg/dL    Chloride 100 98 - 107 mmol/L    Glucose 117 (H) 70 - 99 mg/dL    Alkaline Phosphatase 113 40 - 150 U/L     (H) 0 - 45 U/L    ALT 33 0 - 70 U/L    Protein Total 5.9 (L) 6.4 - 8.3 g/dL    Albumin 2.8 (L) 3.5 - 5.2 g/dL    Bilirubin Total 9.5 (H) <=1.2 mg/dL       Interval History   Patient states doing well today. No new complaints. Bilirubin and INR still climbing, not ready for discharge but possibly tomorrow if numbers start to improve.

## 2025-07-06 NOTE — PLAN OF CARE
Goal Outcome Evaluation:       Pt alert and orient x 4, hospitalization is unfamiliar to patient, does ask many questions and keeps track of info on a note pad.  Pt is independent in room and halls, recording urine output for staff on his note pad.  Pt not reporting loose stools at this time, will monitor.  Pt ate breakfast and a afternoon snack, will order supper.  Pt reports normally only eats 2 meals/day.  Pt denies pain, lymphadema wraps in place.

## 2025-07-06 NOTE — PROGRESS NOTES
"  GASTROENTEROLOGY PROGRESS NOTE     SUBJECTIVE   No new changes overnight.  He has not had any bleeding.  He continues to note some discomfort in his chest after banding but this is improving    Bilirubin increased today though renal indices and INR remain unchanged     OBJECTIVE     Vitals Blood pressure 104/57, pulse 73, temperature 99.2  F (37.3  C), temperature source Oral, resp. rate 18, height 1.753 m (5' 9.02\"), weight (P) 114.9 kg (253 lb 5 oz), SpO2 92%.          Physical Exam  General: awake, alert, oriented times three   Cardiovascular: RRR, 3+ edema   Chest: lungs are clear to auscultation bilaterally   Abdomen: soft, non-tender, + abdominal distension, bowel sounds present   Neurologic: grossly intact  Derm-jaundiced        LABORATORY    ELECTROLYTE PANEL   Recent Labs   Lab 07/06/25  0647 07/05/25  2144 07/05/25  1123 07/05/25  0634 07/04/25  1700 07/04/25  0603   *  --   --  134*  --  135   POTASSIUM 3.6  --   --  4.1  --  3.7   CHLORIDE 100  --   --  101  --  100   CO2 28  --   --  26  --  26   * 102* 192* 141*   < > 130*   CR 0.71  --   --  0.80  --  0.70   BUN 8.9  --   --  10.0  --  11.5    < > = values in this interval not displayed.      HEMATOLOGY PANEL   Recent Labs   Lab 07/06/25  0647 07/05/25  0634 07/04/25  0603   HGB 10.9* 10.6* 10.6*   * 105* 105*   WBC 6.5 6.2 5.6   * 136* 139*   INR 1.78* 1.54* 1.57*      LIVER AND PANCREAS PANEL   Recent Labs   Lab 07/06/25  0647 07/05/25  0634 07/04/25  0603   * 112* 119*   ALT 33 30 31   ALKPHOS 113 120 123   BILITOTAL 9.5* 6.8* 5.9*     IMAGING STUDIES        I have reviewed the current diagnostic and laboratory tests.              IMPRESSION   Cirrhosis  75-year-old male with history of alcohol use disorder, new diagnosis of cirrhosis complicated by ascites, portal hypertension presenting with melena and found to have large esophageal varices status post banding 7/3/2025.      Bilirubin levels can vary in " decompensated cirrhosis.  Stable renal indices and coagulopathy shows no concerning additional decompensation.    A. Esophageal varices  Status post banding 7/3/2025.  He will need repeat banding in 1 month.  Hemoglobin 10.6.  Octreotide for 72 hours, can discharge tomorrow.  B.  Ascites  Status post paracentesis 7/3/2025 with 2.1 L removed.  Negative for SBP.  He is on ceftriaxone for SBP prophylaxis given his concurrent GI bleeding.  Creatinine normal.  BP stable.  Will start diuretics.     2. Liver nodule  Seen on CTA and then follow-up liver MR 7/3/25. LI-RADs 3. Repeat imaging in 3-6 months.            Plan:   1.  Octreotide completed  2.  5-day course of ceftriaxone or similar oral medication for SBP prophylaxis (GI bleed in setting of cirrhosis)  3.  Spironolactone 50 Mg, furosemide 20 Mg daily  4.  Trend MELD labs daily  5.  EGD in 1 month for variceal surveillance.  Our office will arrange.  6.  Follow-up in [facility] hepatology clinic.  Our office will arrange.  7.  Repeat Imaging liver in 3-6 months. Our office will arrange.   8. Recommend complete alcohol cessation.  Social work to see this admission and give options for treatment.  He tells me he is motivated to quit drinking.    GI will sign off.  Please call with any questions or concerns.  His follow-up has been arranged             Alex Kaplan MD  Thank you for the opportunity to participate in the care of this patient.   Please feel free to call me with any questions or concerns.  Phone number [phone].

## 2025-07-07 ENCOUNTER — APPOINTMENT (OUTPATIENT)
Dept: PHYSICAL THERAPY | Facility: HOSPITAL | Age: 75
End: 2025-07-07
Attending: HOSPITALIST
Payer: COMMERCIAL

## 2025-07-07 VITALS
OXYGEN SATURATION: 93 % | HEIGHT: 69 IN | BODY MASS INDEX: 37.52 KG/M2 | SYSTOLIC BLOOD PRESSURE: 120 MMHG | DIASTOLIC BLOOD PRESSURE: 71 MMHG | RESPIRATION RATE: 20 BRPM | HEART RATE: 77 BPM | WEIGHT: 253.31 LBS | TEMPERATURE: 99.5 F

## 2025-07-07 PROBLEM — R19.5 LOOSE STOOLS: Status: ACTIVE | Noted: 2025-07-07

## 2025-07-07 PROBLEM — G25.81 RESTLESS LEGS SYNDROME (RLS): Status: ACTIVE | Noted: 2025-07-07

## 2025-07-07 LAB
ALBUMIN SERPL BCG-MCNC: 2.8 G/DL (ref 3.5–5.2)
ALP SERPL-CCNC: 120 U/L (ref 40–150)
ALT SERPL W P-5'-P-CCNC: 39 U/L (ref 0–70)
ANION GAP SERPL CALCULATED.3IONS-SCNC: 5 MMOL/L (ref 7–15)
AST SERPL W P-5'-P-CCNC: 124 U/L (ref 0–45)
BILIRUB SERPL-MCNC: 11.6 MG/DL
BUN SERPL-MCNC: 7.7 MG/DL (ref 8–23)
CALCIUM SERPL-MCNC: 8.7 MG/DL (ref 8.8–10.4)
CHLORIDE SERPL-SCNC: 101 MMOL/L (ref 98–107)
CREAT SERPL-MCNC: 0.71 MG/DL (ref 0.67–1.17)
EGFRCR SERPLBLD CKD-EPI 2021: >90 ML/MIN/1.73M2
ERYTHROCYTE [DISTWIDTH] IN BLOOD BY AUTOMATED COUNT: 14.2 % (ref 10–15)
FLUAV RNA SPEC QL NAA+PROBE: NEGATIVE
FLUBV RNA RESP QL NAA+PROBE: NEGATIVE
GLUCOSE BLDC GLUCOMTR-MCNC: 114 MG/DL (ref 70–99)
GLUCOSE SERPL-MCNC: 104 MG/DL (ref 70–99)
HCO3 SERPL-SCNC: 28 MMOL/L (ref 22–29)
HCT VFR BLD AUTO: 34 % (ref 40–53)
HGB BLD-MCNC: 11.3 G/DL (ref 13.3–17.7)
INR PPP: 1.7 (ref 0.85–1.15)
MCH RBC QN AUTO: 34.2 PG (ref 26.5–33)
MCHC RBC AUTO-ENTMCNC: 33.2 G/DL (ref 31.5–36.5)
MCV RBC AUTO: 103 FL (ref 78–100)
PLATELET # BLD AUTO: 157 10E3/UL (ref 150–450)
POTASSIUM SERPL-SCNC: 3.7 MMOL/L (ref 3.4–5.3)
PROT SERPL-MCNC: 6 G/DL (ref 6.4–8.3)
PROTHROMBIN TIME: 20.2 SECONDS (ref 11.8–14.8)
RBC # BLD AUTO: 3.3 10E6/UL (ref 4.4–5.9)
RSV RNA SPEC NAA+PROBE: NEGATIVE
SARS-COV-2 RNA RESP QL NAA+PROBE: NEGATIVE
SODIUM SERPL-SCNC: 134 MMOL/L (ref 135–145)
WBC # BLD AUTO: 6.7 10E3/UL (ref 4–11)

## 2025-07-07 PROCEDURE — 250N000013 HC RX MED GY IP 250 OP 250 PS 637: Performed by: HOSPITALIST

## 2025-07-07 PROCEDURE — 87637 SARSCOV2&INF A&B&RSV AMP PRB: CPT | Performed by: HOSPITALIST

## 2025-07-07 PROCEDURE — 99239 HOSP IP/OBS DSCHRG MGMT >30: CPT | Performed by: HOSPITALIST

## 2025-07-07 PROCEDURE — 97535 SELF CARE MNGMENT TRAINING: CPT | Mod: GP | Performed by: PHYSICAL THERAPIST

## 2025-07-07 PROCEDURE — 36415 COLL VENOUS BLD VENIPUNCTURE: CPT | Performed by: HOSPITALIST

## 2025-07-07 PROCEDURE — 85014 HEMATOCRIT: CPT | Performed by: HOSPITALIST

## 2025-07-07 PROCEDURE — 85610 PROTHROMBIN TIME: CPT | Performed by: HOSPITALIST

## 2025-07-07 PROCEDURE — 82040 ASSAY OF SERUM ALBUMIN: CPT | Performed by: HOSPITALIST

## 2025-07-07 RX ORDER — MULTIPLE VITAMINS W/ MINERALS TAB 9MG-400MCG
1 TAB ORAL DAILY
Qty: 100 TABLET | Refills: 0 | Status: SHIPPED | OUTPATIENT
Start: 2025-07-07

## 2025-07-07 RX ORDER — LANOLIN ALCOHOL/MO/W.PET/CERES
100 CREAM (GRAM) TOPICAL DAILY
Qty: 30 TABLET | Refills: 0 | Status: SHIPPED | OUTPATIENT
Start: 2025-07-07

## 2025-07-07 RX ORDER — SPIRONOLACTONE 25 MG/1
25 TABLET ORAL DAILY
Qty: 30 TABLET | Refills: 0 | Status: SHIPPED | OUTPATIENT
Start: 2025-07-07

## 2025-07-07 RX ORDER — GABAPENTIN 300 MG/1
300 CAPSULE ORAL EVERY 8 HOURS
Qty: 90 CAPSULE | Refills: 0 | Status: SHIPPED | OUTPATIENT
Start: 2025-07-08 | End: 2025-07-17

## 2025-07-07 RX ORDER — FUROSEMIDE 20 MG/1
20 TABLET ORAL DAILY
Qty: 30 TABLET | Refills: 0 | Status: SHIPPED | OUTPATIENT
Start: 2025-07-07

## 2025-07-07 RX ADMIN — Medication 1 TABLET: at 09:35

## 2025-07-07 RX ADMIN — FOLIC ACID 1 MG: 1 TABLET ORAL at 09:35

## 2025-07-07 RX ADMIN — THIAMINE HCL TAB 100 MG 100 MG: 100 TAB at 09:35

## 2025-07-07 RX ADMIN — Medication 1 CAPSULE: at 09:35

## 2025-07-07 RX ADMIN — FUROSEMIDE 20 MG: 20 TABLET ORAL at 09:35

## 2025-07-07 RX ADMIN — SPIRONOLACTONE 25 MG: 25 TABLET, FILM COATED ORAL at 09:35

## 2025-07-07 RX ADMIN — GABAPENTIN 600 MG: 300 CAPSULE ORAL at 01:51

## 2025-07-07 RX ADMIN — GABAPENTIN 600 MG: 300 CAPSULE ORAL at 09:35

## 2025-07-07 ASSESSMENT — ACTIVITIES OF DAILY LIVING (ADL)
ADLS_ACUITY_SCORE: 38

## 2025-07-07 NOTE — PROGRESS NOTES
Care Management Discharge Note    Discharge Date: 07/07/2025       Discharge Disposition: Home    Discharge Services: [facility]    Discharge DME: None    Discharge Transportation: family or friend will provide    Private pay costs discussed: Not applicable    Does the patient's insurance plan have a 3 day qualifying hospital stay waiver?  No    PAS Confirmation Code: NA  Patient/family educated on Medicare website which has current facility and service quality ratings: yes    Education Provided on the Discharge Plan: Yes  Persons Notified of Discharge Plans: patient, per treatment team   Patient/Family in Agreement with the Plan: yes    Handoff Referral Completed: No, handoff not indicated or clinically appropriate    Additional Information:  Reviewed chart. Patient discharging home to prior living environment. CD consult pending. SW added chemical dependency resources to discharge instructions in case patient discharges prior to being seen by CD. Family transport.     9:46 AM  Discussed with MD. Reached out to [facility] evaluator to inquire if there is availability for patient to be seen before discharge.     9:56 AM  Confirmed with CD evaluator that patient will be seen before discharge this afternoon. Updated MD and bedside RN.     YARELY Cruz

## 2025-07-07 NOTE — PLAN OF CARE
Problem: Adult Inpatient Plan of Care  Goal: Optimal Comfort and Wellbeing  Outcome: Progressing  Intervention: Provide Person-Centered Care  Recent Flowsheet Documentation  Taken 7/7/2025 0800 by Shameka Read, RN  Trust Relationship/Rapport: care explained   Goal Outcome Evaluation:   Patient was cooperative with cars and treatments. Patient walked out side his room for 150 feet. Patient in good spirits.    Shameka Torres, RN

## 2025-07-07 NOTE — DISCHARGE INSTRUCTIONS
MENTAL HEALTH RESOURCES & SERVICES:   [facility] Scheduling  During your hospitalization, you were seen by a licensed mental health professional through Triage and Transition sevices for a brief mental health assessment and/or psychotherapy at [facility] , a part of [facility].  It is recommended that you follow up with your established providers (psychiatrist, mental health therapist, and/or primary care doctor - as relevant) as soon as possible. An [facility] Behavioral Coordinator will be calling you within the next 24-48 hours to ensure that you have the resources you need.      You can connect with a [facility] Behavioral Coordinator directly by [phone].    You have been scheduled for the following mental health appointments:     Date: Thursday, 2025  Time: 5:30 pm - 6:30 pm  Provider: Elvin DOVER  Location: [facility], [facility], [address] [place] [address]  Phone: [phone]  Type: Therapy - (In-Person)    Patient instructions:  All first meetings are in person. Please expect to have a short phone conversation prior to the first appointment. Your provider will call you. The clinic website is [facility]       [facility] maintains an extensive network of licensed behavioral health providers to connect patients with the services they need.  We do not charge providers a fee to participate in our referral network.  We match patients with providers based on a patient s specific needs, insurance coverage, and location.  Our first effort will be to refer you to a provider within your care system, and will utilize providers outside your care system as needed.      Referrals/ Alternatives:  [facility]: (self pay)  Phone: [phone]  Fax: [phone]  Email: [email or web address]  Address:  [address] [address]  [email or web address]     [facility]  [place] [facility]: In-person only  Coordinator: Chelsy Turner  Phone: [phone]  email: [email or web address]  [email or web address]               [facility] Co-occurring IOP: M,W, TH 9am-12pm (Accepts Medicare)               [place] [facility]-occur[facility] IOP Evening: M,T,W 5:00pm-8:00pm (accepts Medicare)               [place] [facility]: ?M,T,W 12:00pm-2:00pm (Accepts Medicare)     [facility] Virtual Addiction Therapy:  Call [phone] to request a virtual addiction therapy appointment.  [email or web address]            Substance Use Disorder Direct Access Resources    It is recommended that you abstain from all mood altering chemicals. Please contact the [employer] hotline ([phone]) as needed; phones are answered 24 hours a day, 7 days a week.    To access substance use treatment you must have a comprehensive assessment completed to begin any treatment program.     If uninsured, please contact your county of residence for eligibility screen to substance use disorder evaluation and treatment:    [place] - [phone]   [facility] [phone]   [facility] [phone]   [facility] [phone]   [facility] [phone]   [facility] [phone]   [facility] [phone]   [place] - [phone]     If you have private insurance, call the customer service number on the back of your insurance card to find an in-network substance abuse use disorder assessment. The ideal provider will be a treatment facility, licensed in the [facility].     Community ALEXI Evaluations: Clients may call their county for a full list of providers - Availability and services listed belo are subject to change, please call the provider to confirm    [facility]  [phone]  [facility] [place], [place], [address]  *Please call the above number to schedule a comprehensive assessment for determination of level of care needs. In person and virtual appointments available  Mon-Fri.    [facility], [address], First Floor, [address], [place] [address] (next to the outpatient lab)    Phone: [phone]   Provides bridging services to people with Opiate Use Disorders (OUD) seeking care. This is a front door to Medication Assisted Treatments (MAT), ages 16+  Walk In hours: Monday-Friday 9:00am-3:00pm    [facility]  [phone]  Walk in Assessments: Mon-Friday 7a-1:45p  [address], [place], [address]    [facility]  Central Access [phone]  [address] [place], [place], [address]  *by appointment only    [facility]  [phone] (phone consultation available )  Locations in: [place], [facility], and [place], [place]  [facility] IOP [phone] or visit [email or web address]   Also offers LGBTQ programming     [facility]  [phone]  [address] [facility] [address]  [place], [place], [address]  *Currently only offered via telehealth - call to set up an appointment    [facility]  [address] [place] [address], [address]  Co-Occuring Recovery Program  For more information to to make a referral call:  [phone]  Walk-in on   9-11 a.m.    [facility]  [phone]  [address] [place], [place], [address]  *available by appointments only    [facility]  [phone]  [address] [place], [place], [address]  *available by appointment only    [facility]  [phone]  [address] [place], [place], [address]  *walk in assessments available M-F starting at 7 am.    [facility]  [phone]  Locations: [facility], [facility], and [place]  *Walk in assessments availble M-F starting at 8 am -virtual only    [facility]  [phone]  [address] [place] [address]    [facility]  Virtual + Locations:  [place], [place], [place], [place], [facility], [place], [place], [place]   [phone]  *available by appointment only    [facility]  [phone]  [address]  [place], [place], [address]    [facility]  [phone]  [address]  [place], [place], [address]  *available by appointment    [facility]  [phone]  [address]  [place], [place], [address]  *walk ins available M-TH from 9-3    [facility]  MAT program: [phone]  [address] [place], [place], [address]    [place]  [phone]  Same day substance use disorder assessments are available Monday - Friday, via walk-in or by appointment at the [place] location.  [address] [facility], [address], [place] [address]     [facility] - adolescent and adult SUDs services  [phone]  Offer services Monday through Friday, as well as evening hours Monday through Thursday. Normally, a first appointment will be scheduled within one week  [email or web address]  Locations all over [place]    If you are intoxicated, you may be required to detox at a detox facility before starting treatment. The following are detox facilities that you can self present to. All detox facilities are able to help you complete an assessment prior to discharge if you choose:    [facility]: Arrive at a [facility] for immediate medical evaluation    [facility]: [address] [place], [place], [address].         [phone]    [facility]: [address] [place], [place], [address]  [phone]     [facility] ([facility]): [address] [place], [place], [address] [phone]     [facility]: [address] [place], [address], [phone]         Ways to help cope with sobriety:    -- Take prescribed medicines as scheduled  -- Keep follow-up appointments  -- Talk to others about your  concerns  -- Get regular exercise  -- Practice deep breathing skills  -- Eat a healthy diet  -- Use community resources, including hotline numbers, [facility] crisis and support meetings  -- Stay sober and avoid places/people/things associated with substance use  --Maintain a daily schedule/routine  --Get at least 7-8 hours of sleep per night  --Create a list 10--20 healthy activities that you can do that are enjoyable and do not involve substance use  --Create daily goals (approx. 1-4 goals) per day and work to achieve them throughout the day.       Free Resources:    [facility] ([facility])  [facility] connects people seeking recovery to resources that help foster and sustain long-term recovery. Whether you are seeking resources for treatment, transportation, housing, job training, education, health care or other pathways to recovery, [facility] is a great place to start.  Phone: [phone]. www.[employer].com (Great listing of all types of recovery and non-recovery related resources)    Alcoholics Anonymous  Phone: 1-426-ALCOHOL  Website: HTTP://WWW.AA.ORG/  [facility] [place] (083-713-9274 or http://aaPergunter.org)  [facility] [place] (162-329-7131 or www.aastpaul.org)     Narcotics Anonymous  Phone: 822.367.2032  Website: www.[employer].    [facility]  [address] [place] [address], [address], [place], [place],  Phone: [phone]  Drop-in Hours: Monday-Friday 9-11:30 am. By appointment at other times.  Provides: [facility] is a drop-in center on the east side [facility] that provides a safe space for individuals who are homeless and have a history of chemical use. Sobriety is not a requirement but drugs and alcohol are not allowed on the property.  Services: Non-clients can access drop-in services such as Recovery and Harm Reduction Groups, referrals to case management, community activities, shower facilities, and a pool table. Individuals who are homeless and have chemical health needs may be eligible  for enrollment into [facility]'s case management program. Clients and  work together to access benefits, treatment, health care, shelter, and external housing resources.

## 2025-07-07 NOTE — DISCHARGE SUMMARY
[facility] MEDICINE  DISCHARGE SUMMARY     Primary Care Physician: [facility]  Admission Date: 7/2/2025   Discharge Provider: Dulce Prieto MD Discharge Date: 7/7/2025      Code Status: Full Code     Condition at Discharge: Stable     REASON FOR PRESENTATION(See Admission Note for Details)   melena    PRINCIPAL & ACTIVE DISCHARGE DIAGNOSES     Active Problems:    GI bleed    Secondary esophageal varices with bleeding (H)    ABLA (acute blood loss anemia)    Alcoholic cirrhosis of liver with ascites (H)    Alcoholism (H)    Lymphedema    Chronic systolic heart failure (H)    Loose stools    Restless legs syndrome (RLS)      PENDING LABS     Unresulted Labs Ordered in the Past 30 Days of this Admission       Date and Time Order Name Status Description    7/2/2025  6:51 PM Ascites Fluid Aerobic Bacterial Culture Routine With Gram Stain Preliminary             PROCEDURES ( this hospitalization only)      Procedure(s):  ESOPHAGOGASTRODUODENOSCOPY WITH ESOPHAGEAL VARICES BANDING    RECOMMENDATIONS TO OUTPATIENT PROVIDER FOR F/U VISIT     Follow-up Appointments       Follow Up      Appointments:  Follow up with GI in 1 month with the liver (hepatology) department. If you do not hear from them, call [facility] at [phone] to schedule.  Follow up with alcohol treatment program.  Follow up with lymphedema clinic.    Tests:  Set of labs with primary doctor in about 1 week.  You should get another ultrasound of the heart (echocardiogram) when you have been sober from alcohol for about 1 month. Your primary doctor can order this.  MRI of the liver in 3-6 months. The GI clinic can order this.        Hospital Follow-up with Existing Primary Care Provider (PCP)          Schedule Primary Care visit within: 7 Days   Recommended labs and Imaging (to be ordered by Primary Care Provider): CBC, hepatic profile, INR, BMP               DISPOSITION     Home    SUMMARY OF HOSPITAL COURSE:       Malcom Chowdhury is a 75 year old male with history of alcoholism, ADHD, mood disorder presented for evaluation of melena found to have variceal bleeding, now status post banding.  New diagnosis of alcoholic cirrhosis. Hospital Day: 6        # Acute upper GI bleed due to new diagnosis of esophageal varices, now status post banding  # Acute blood loss anemia  -Presented with 2 days of melenic stools  -hgb 13.3-->10.8  - EGD completed 7/3 showing esophageal varices which were banded  -got vitamin K, & Octreotide drip x72 hrs  -no ongoing bleeding, Hgb stable  -tolerating diet  -followup EGD 1 month with GI     # New diagnosis of alcoholic cirrhosis  # Probably some component of alcoholic hepatitis  #Extensive findings of portal hypertension  -CT abdomen showed:  2.  Cirrhosis with portal venous hypertension, including moderate ascites, numerous portosystemic collaterals, and splenomegaly.  4.  Wall thickening involving the ascending colon and proximal small bowel could be secondary to portal enterocolopathy secondary to underlying liver disease.  5.  Several enlarged gastrohepatic and althea hepatis lymph nodes, possibly reactive.    -MELD-Na of 20  -GI following, initial workup ordered, needs outpatient hepatology followup  -Bilirubin still climbing but INR stable today. Should get a set of MELD labs in 1 week. Return precautions discussed     # Large volume ascites due to above  -SAAG >1.1 c/w portal hypertension  -No growth on culture  -started Lasix and spironolactone per GI     #9mm liver nodule  -MRI showed 9 mm hyperenhancing nodule in segment 4A of the liver  - Radiologist recommends follow-up MRI in 3 to 6 months     # Alcoholism  -Was intoxicated on admission  -did not have severe withdrawal  - gave resources. Patient states motivated to quit drinking, planning intensive outpatient program and individual therapy  -Rx for mvi and B1    #RLS  - started on gabapentin     # Peripheral edema  - continue  "compression  -follow up with his regular Vascular provider outpatient     # New diagnosis of chronic systolic heart failure  -Echocardiogram noting a mildly reduced LVEF 50 to 55%.  No prior echocardiogram available for review  -Suspect possibly due to alcoholism.   - Does not appear grossly volume overloaded right now, does have peripheral edema which is chronic and currently worse likely due to mild hypoalbuminemia as well as noncompliance with compression  -Started on diuretics for ascites per GI recs as above  -recommend followup echo in 1-2 months while sober; if remains abnormal would have him see cardiology for eval     #Heart murmur  #Mild-moderate aortic stenosis  -followup echo as above    #Loose stools  -incontinent at times  -improved with fiber supplement  -continue fiber at discharge       Clinically Significant Risk Factors         # Hyponatremia: Lowest Na = 134 mmol/L in last 2 days, will monitor as appropriate       # Hypoalbuminemia: Lowest albumin = 2.8 g/dL at 7/7/2025  6:46 AM, will monitor as appropriate  # Coagulation Defect: INR = 1.70 (Ref range: 0.85 - 1.15) and/or PTT = N/A, will monitor for bleeding               # Obesity: Estimated body mass index is 37.39 kg/m  as calculated from the following:    Height as of this encounter: 1.753 m (5' 9.02\").    Weight as of this encounter: 114.9 kg (253 lb 5 oz).      # Financial/Environmental Concerns: none            Discharge Medications with Med changes:     Current Discharge Medication List        START taking these medications    Details   furosemide (LASIX) 20 MG tablet Take 1 tablet (20 mg) by mouth daily.  Qty: 30 tablet, Refills: 0    Associated Diagnoses: Alcoholic cirrhosis of liver with ascites (H)      gabapentin (NEURONTIN) 300 MG capsule Take 1 capsule (300 mg) by mouth every 8 hours.  Qty: 90 capsule, Refills: 0    Associated Diagnoses: Restless legs syndrome (RLS)      multivitamin w/minerals (THERA-VIT-M) tablet Take 1 tablet by " mouth daily.  Qty: 100 tablet, Refills: 0    Associated Diagnoses: Alcoholic cirrhosis of liver with ascites (H)      psyllium (METAMUCIL/KONSYL) capsule Take 1 capsule by mouth daily.  Qty: 90 capsule, Refills: 3    Comments: Pharmacy to dispense capsule size that is available.  Associated Diagnoses: Loose stools      spironolactone (ALDACTONE) 25 MG tablet Take 1 tablet (25 mg) by mouth daily.  Qty: 30 tablet, Refills: 0    Associated Diagnoses: Alcoholic cirrhosis of liver with ascites (H)      thiamine (B-1) 100 MG tablet Take 1 tablet (100 mg) by mouth daily.  Qty: 30 tablet, Refills: 0    Associated Diagnoses: Alcoholic cirrhosis of liver with ascites (H)               Consults     GASTROENTEROLOGY IP CONSULT  CARE MANAGEMENT / SOCIAL WORK IP CONSULT  LYMPHEDEMA THERAPY IP CONSULT  NUTRITION SERVICES ADULT IP CONSULT  CHEMICAL DEPENDENCY IP CONSULT      SIGNIFICANT IMAGING FINDINGS     Results for orders placed or performed during the hospital encounter of 07/02/25   CTA GI Bleed    Impression    IMPRESSION:  1.  No evidence for active GI bleed.  2.  Cirrhosis with portal venous hypertension, including moderate ascites, numerous portosystemic collaterals, and splenomegaly.  3.  Innumerable hypodense nodules throughout the liver, most likely representing regenerative nodules. MRI recommended for further evaluation.  4.  Wall thickening involving the ascending colon and proximal small bowel could be secondary to portal enterocolopathy secondary to underlying liver disease.  5.  Several enlarged gastrohepatic and althea hepatis lymph nodes, possibly reactive.    MR Liver wo & w Contrast    Impression    IMPRESSION:  1.  Cirrhosis.  2.  0.9 cm hyperenhancing nodule within segment 4A. This represents a LI-RADS 3. Recommend follow-up MRI in 3-6 months.  3.  Mild splenomegaly.  4.  Small to moderate amount of ascites. Small periesophageal varices.   US Paracentesis with Albumin    Impression    IMPRESSION:  1.  Status  post ultrasound-guided paracentesis.    Reference CPT Code: 71579   Echocardiogram Complete   Result Value Ref Range    LVEF  50-55% (borderline)        SIGNIFICANT LABORATORY FINDINGS     Most Recent 3 CBC's:  Recent Labs   Lab Test 07/07/25  0646 07/06/25  0647 07/05/25  0634   WBC 6.7 6.5 6.2   HGB 11.3* 10.9* 10.6*   * 104* 105*    146* 136*     Most Recent 3 BMP's:  Recent Labs   Lab Test 07/07/25  1258 07/07/25  0646 07/06/25  2135 07/06/25  1733 07/06/25  0647 07/05/25  1123 07/05/25  0634   NA  --  134*  --   --  134*  --  134*   POTASSIUM  --  3.7  --   --  3.6  --  4.1   CHLORIDE  --  101  --   --  100  --  101   CO2  --  28  --   --  28  --  26   BUN  --  7.7*  --   --  8.9  --  10.0   CR  --  0.71  --   --  0.71  --  0.80   ANIONGAP  --  5*  --   --  6*  --  7   ANDRES  --  8.7*  --   --  8.7*  --  8.4*   * 104* 131*   < > 117*   < > 141*    < > = values in this interval not displayed.     Most Recent 2 LFT's:  Recent Labs   Lab Test 07/07/25  0646 07/06/25  0647   * 114*   ALT 39 33   ALKPHOS 120 113   BILITOTAL 11.6* 9.5*     Most Recent 3 INR's:  Recent Labs   Lab Test 07/07/25  0646 07/06/25  0647 07/05/25  0634   INR 1.70* 1.78* 1.54*         Discharge Orders        Lymphedema Therapy  Referral      Reason for your hospital stay    Cirrhosis of liver. GI bleed     Activity    Your activity upon discharge: activity as tolerated     When to contact your care team    Call your primary doctor if you have any of the following: chest pain, shortness of breath, fever, chills, fainting, dizziness, vomiting, constipation, dehydration, worsening pain, bleeding, skin or eyes turning more yellow, or trouble urinating, or any other symptoms that are new or concerning to you.     Follow Up    Appointments:  Follow up with GI in 1 month with the liver (hepatology) department. If you do not hear from them, call [facility] at [phone] to schedule.  Follow up with alcohol treatment  program.  Follow up with lymphedema clinic.    Tests:  Set of labs with primary doctor in about 1 week.  You should get another ultrasound of the heart (echocardiogram) when you have been sober from alcohol for about 1 month. Your primary doctor can order this.  MRI of the liver in 3-6 months. The GI clinic can order this.     Diet    Follow this diet upon discharge:     2 Gram Sodium per day     Hospital Follow-up with Existing Primary Care Provider (PCP)            Examination   Physical Exam   Temp:  [97.5  F (36.4  C)-99.5  F (37.5  C)] 99.5  F (37.5  C)  Pulse:  [75-77] 77  Resp:  [20-22] 20  BP: (110-120)/(70-74) 120/71  SpO2:  [93 %-94 %] 93 %  Wt Readings from Last 1 Encounters:   07/06/25 114.9 kg (253 lb 5 oz)       General: in no apparent distress, non-toxic, and alert male lying in hospital bed oriented x3  HEENT: Head normocephalic atraumatic, oral mucosa moist. Sclerae icteric  Skin: pale.  jaundiced  Extremities: 2+ pitting edema bilateral ankles, wraps in place  Psych: Normal affect, mood euthymic  Neuro: Grossly normal    Please see EMR for more detailed significant labs, imaging, consultant notes etc.    IDulce MD, personally saw the patient today and spent greater than 30 minutes discharging this patient.    Dulce Prieto MD  [facility] [place]    CC:Clinic, [facility]

## 2025-07-07 NOTE — PLAN OF CARE
Problem: Adult Inpatient Plan of Care  Goal: Plan of Care Review  Description: The Plan of Care Review/Shift note should be completed every shift.  The Outcome Evaluation is a brief statement about your assessment that the patient is improving, declining, or no change.  This information will be displayed automatically on your shift  note.  Outcome: Progressing     Problem: Infection  Goal: Absence of Infection Signs and Symptoms  Outcome: Progressing   Goal Outcome Evaluation:       A & O x4. Room air. Denies pain. Up to toilet independently. Slept well the night.

## 2025-07-07 NOTE — PROGRESS NOTES
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   Inhalant Use Disorder The patient does not meet the criteria for an Inhalant use disorder.   Opioid Use Disorder The patient does not meet the criteria for an Opioid use disorder.   Sedative, Hypnotic, or Anxiolytic Use Disorder The patient does not meet the criteria for a Sedative/Hypnotic use disorder.   Stimulant Related Disorder The patient does not meet the criteria for a Stimulant use disorder.   Tobacco Use Disorder The patient does not meet the criteria for a Tobacco use disorder.   Other (or unknown) Substance Use Disorder The patient does not meet the criteria for a Other (or unknown) Substance use disorder.     Recommendations:   Recommendations: Patient meets criteria for the following levels of care based on ASAM Criteria:    Withdrawal Management - No Withdrawal Management Indicated  Treatment/Recovery Services - 2.1 Intensive Outpatient Services.  Patient's placement align's with the assessment and placement level of care recommendation based on current ASAM Dimension scale ratings.     Patient reports they are willing to follow these recommendations.    Patient would like the following family or other support people  involved in their treatment: na.   Patient does not have a history of opiate use.    Clinical Substantiation:  Pt has a long history of alcohol use. He stopped drinking for 3 years beginning in 2001. He has never attended a A[facility] treatment program before.  Pt appears motivated for sobriety. He is willing to attend individual therapy at first due to feeling anxious about being in a group setting.    Referrals/ Alternatives:  [facility]: (self pay)  Phone: [phone]  Fax: [phone]  Email: [email or web address]  Address:  [address] [place] [address]  [email or web address]    [facility] IOP: In-person only  Coordinator: Chelsy Turner  Phone: [phone]  email: [email or web address]  [email or web address]   [facility]: M,W, TH 9am-12pm (Accepts Medicare)   [facility] Evening: M,T,W 5:00pm-8:00pm (accepts Medicare)   [facility]: ?M,T,W 12:00pm-2:00pm (Accepts Medicare)    [facility]:  Call [phone] to request a virtual addiction therapy appointment.  [email or web address]      ALEXI consult completed by:   Karissa Flores [facility]   ALEXI Evaluation Counselor  Email: [email or web address] ; Phone: [phone]

## 2025-07-07 NOTE — CONSULTS
7/7/2025  Pt completed his ALEXI CA today. He is willing to attend individual therapy at first due to feeling anxious about being in a group setting. I had my coordinator schedule an individual therapy appt for when he discharged. I emailed the below tx programs to him today.    DAANES Service Initiation Date ID: [id number]    Recommendations:   Recommendations: Patient meets criteria for the following levels of care based on ASAM Criteria:    Withdrawal Management - No Withdrawal Management Indicated  Treatment/Recovery Services - 2.1 Intensive Outpatient Services.  Patient's placement align's with the assessment and placement level of care recommendation based on current ASAM Dimension scale ratings.     Patient reports they are willing to follow these recommendations.    Patient would like the following family or other support people involved in their treatment: na.   Patient does not have a history of opiate use.    Clinical Substantiation:  Pt has a long history of alcohol use. He stopped drinking for 3 years beginning in 2001. He has never attended a A[facility]I treatment program before.  Pt appears motivated for sobriety. He is willing to attend individual therapy at first due to feeling anxious about being in a group setting.    Referrals/ Alternatives:  [facility]: (self pay)  Phone: [phone]  Fax: [phone]  Email: [email or web address]  Address:  [address] [facility] [place] [address]  [email or web address]    [facility] IOP: In-person only  Coordinator: Chelsy Turner  Phone: [phone]  email: [email or web address]  [email or web address]   [facility]: M,W, TH 9am-12pm (Accepts Medicare)   [facility] Evening: M,T,W 5:00pm-8:00pm (accepts Medicare)   [facility]: ?M,T,W 12:00pm-2:00pm (Accepts Medicare)    [facility]:  Call [phone] to request a virtual addiction therapy  appointment.  [email or web address]    ALEXI consult completed by:   Karissa Flores [facility]   ALEXI Evaluation Counselor  Email: [email or web address] ; Phone: [phone]

## 2025-07-07 NOTE — PLAN OF CARE
Lymphedema Discharge Summary    Reason for therapy discharge:    Discharged to home with outpatient lymphedema therapy.    Progress towards therapy goal(s). See goals on Care Plan in [facility] electronic health record for goal details.  Goals partially met.  Barriers to achieving goals:   discharge from facility.    Therapy recommendation(s):    Continued therapy is recommended.  Rationale/Recommendations:  recommend outpatient lymphedema therapy.      Bernice De, PT  7/7/2025

## 2025-07-08 LAB
BACTERIA FLD CULT: NO GROWTH
GRAM STAIN RESULT: NORMAL
GRAM STAIN RESULT: NORMAL

## 2025-07-16 ENCOUNTER — APPOINTMENT (OUTPATIENT)
Dept: CT IMAGING | Facility: HOSPITAL | Age: 75
End: 2025-07-16
Attending: EMERGENCY MEDICINE
Payer: COMMERCIAL

## 2025-07-16 ENCOUNTER — APPOINTMENT (OUTPATIENT)
Dept: ULTRASOUND IMAGING | Facility: HOSPITAL | Age: 75
End: 2025-07-16
Attending: EMERGENCY MEDICINE
Payer: COMMERCIAL

## 2025-07-16 ENCOUNTER — HOSPITAL ENCOUNTER (OUTPATIENT)
Facility: HOSPITAL | Age: 75
Setting detail: OBSERVATION
LOS: 1 days | Discharge: HOME | End: 2025-07-17
Attending: EMERGENCY MEDICINE | Admitting: HOSPITALIST
Payer: COMMERCIAL

## 2025-07-16 DIAGNOSIS — K70.31 ALCOHOLIC CIRRHOSIS OF LIVER WITH ASCITES (H): Primary | ICD-10-CM

## 2025-07-16 DIAGNOSIS — E87.1 HYPONATREMIA: ICD-10-CM

## 2025-07-16 DIAGNOSIS — D72.829 LEUKOCYTOSIS, UNSPECIFIED TYPE: ICD-10-CM

## 2025-07-16 DIAGNOSIS — R17 JAUNDICE: ICD-10-CM

## 2025-07-16 DIAGNOSIS — E80.6 HYPERBILIRUBINEMIA: ICD-10-CM

## 2025-07-16 DIAGNOSIS — G25.81 RESTLESS LEGS SYNDROME (RLS): ICD-10-CM

## 2025-07-16 DIAGNOSIS — K70.31 ASCITES DUE TO ALCOHOLIC CIRRHOSIS (H): ICD-10-CM

## 2025-07-16 LAB
% LINING CELLS, BODY FLUID: 5 %
ALBUMIN BODY FLUID SOURCE: NORMAL
ALBUMIN FLD-MCNC: 0.5 G/DL
ALBUMIN SERPL BCG-MCNC: 3 G/DL (ref 3.5–5.2)
ALP SERPL-CCNC: 161 U/L (ref 40–150)
ALT SERPL W P-5'-P-CCNC: 44 U/L (ref 0–70)
ANION GAP SERPL CALCULATED.3IONS-SCNC: 11 MMOL/L (ref 7–15)
APPEARANCE FLD: CLEAR
APTT PPP: 42 SECONDS (ref 22–38)
AST SERPL W P-5'-P-CCNC: 114 U/L (ref 0–45)
BASOPHILS # BLD AUTO: 0.2 10E3/UL (ref 0–0.2)
BASOPHILS NFR BLD AUTO: 1 %
BILIRUB DIRECT SERPL-MCNC: 19 MG/DL (ref 0–0.3)
BILIRUB SERPL-MCNC: 26.9 MG/DL
BUN SERPL-MCNC: 14.9 MG/DL (ref 8–23)
CALCIUM SERPL-MCNC: 8.5 MG/DL (ref 8.8–10.4)
CHLORIDE SERPL-SCNC: 95 MMOL/L (ref 98–107)
COLOR FLD: YELLOW
CREAT SERPL-MCNC: 0.93 MG/DL (ref 0.67–1.17)
EGFRCR SERPLBLD CKD-EPI 2021: 87 ML/MIN/1.73M2
EOSINOPHIL # BLD AUTO: 0.3 10E3/UL (ref 0–0.7)
EOSINOPHIL NFR BLD AUTO: 2 %
ERYTHROCYTE [DISTWIDTH] IN BLOOD BY AUTOMATED COUNT: 14.9 % (ref 10–15)
GLUCOSE SERPL-MCNC: 133 MG/DL (ref 70–99)
HCO3 SERPL-SCNC: 21 MMOL/L (ref 22–29)
HCT VFR BLD AUTO: 36.2 % (ref 40–53)
HGB BLD-MCNC: 12.6 G/DL (ref 13.3–17.7)
HOLD SPECIMEN: NORMAL
IMM GRANULOCYTES # BLD: 0.2 10E3/UL
IMM GRANULOCYTES NFR BLD: 1 %
INR PPP: 1.65 (ref 0.85–1.15)
LACTATE SERPL-SCNC: 1.9 MMOL/L (ref 0.7–2)
LIPASE SERPL-CCNC: 160 U/L (ref 13–60)
LYMPHOCYTES # BLD AUTO: 1.1 10E3/UL (ref 0.8–5.3)
LYMPHOCYTES NFR BLD AUTO: 8 %
LYMPHOCYTES NFR FLD MANUAL: 10 %
MCH RBC QN AUTO: 34.2 PG (ref 26.5–33)
MCHC RBC AUTO-ENTMCNC: 34.8 G/DL (ref 31.5–36.5)
MCV RBC AUTO: 98 FL (ref 78–100)
MONOCYTES # BLD AUTO: 1.4 10E3/UL (ref 0–1.3)
MONOCYTES NFR BLD AUTO: 10 %
MONOS+MACROS NFR FLD MANUAL: 79 %
NEUTROPHILS # BLD AUTO: 11 10E3/UL (ref 1.6–8.3)
NEUTROPHILS # FLD: 15.1 /UL (ref ?–250)
NEUTROPHILS NFR BLD AUTO: 78 %
NEUTS BAND NFR FLD MANUAL: 6 %
NRBC # BLD AUTO: 0 10E3/UL
NRBC BLD AUTO-RTO: 0 /100
PLATELET # BLD AUTO: 330 10E3/UL (ref 150–450)
POTASSIUM SERPL-SCNC: 3.9 MMOL/L (ref 3.4–5.3)
PROT FLD-MCNC: 0.9 G/DL
PROT SERPL-MCNC: 6.7 G/DL (ref 6.4–8.3)
PROTEIN BODY FLUID SOURCE: NORMAL
PROTHROMBIN TIME: 19.6 SECONDS (ref 11.8–14.8)
RBC # BLD AUTO: 3.68 10E6/UL (ref 4.4–5.9)
SODIUM SERPL-SCNC: 127 MMOL/L (ref 135–145)
SPECIMEN SOURCE FLD: NORMAL
WBC # BLD AUTO: 14.1 10E3/UL (ref 4–11)
WBC # FLD AUTO: 251 /UL

## 2025-07-16 PROCEDURE — 120N000001 HC R&B MED SURG/OB

## 2025-07-16 PROCEDURE — 272N000710 US PARACENTESIS WITH ALBUMIN

## 2025-07-16 PROCEDURE — 87070 CULTURE OTHR SPECIMN AEROBIC: CPT | Performed by: EMERGENCY MEDICINE

## 2025-07-16 PROCEDURE — 80048 BASIC METABOLIC PNL TOTAL CA: CPT | Performed by: EMERGENCY MEDICINE

## 2025-07-16 PROCEDURE — 99285 EMERGENCY DEPT VISIT HI MDM: CPT | Mod: 25

## 2025-07-16 PROCEDURE — 83690 ASSAY OF LIPASE: CPT | Performed by: EMERGENCY MEDICINE

## 2025-07-16 PROCEDURE — 84155 ASSAY OF PROTEIN SERUM: CPT | Performed by: EMERGENCY MEDICINE

## 2025-07-16 PROCEDURE — 99222 1ST HOSP IP/OBS MODERATE 55: CPT | Performed by: HOSPITALIST

## 2025-07-16 PROCEDURE — 74177 CT ABD & PELVIS W/CONTRAST: CPT

## 2025-07-16 PROCEDURE — 87205 SMEAR GRAM STAIN: CPT | Performed by: EMERGENCY MEDICINE

## 2025-07-16 PROCEDURE — 87040 BLOOD CULTURE FOR BACTERIA: CPT | Performed by: EMERGENCY MEDICINE

## 2025-07-16 PROCEDURE — 89050 BODY FLUID CELL COUNT: CPT | Performed by: EMERGENCY MEDICINE

## 2025-07-16 PROCEDURE — 36415 COLL VENOUS BLD VENIPUNCTURE: CPT | Performed by: EMERGENCY MEDICINE

## 2025-07-16 PROCEDURE — 85610 PROTHROMBIN TIME: CPT | Performed by: EMERGENCY MEDICINE

## 2025-07-16 PROCEDURE — 85730 THROMBOPLASTIN TIME PARTIAL: CPT | Performed by: EMERGENCY MEDICINE

## 2025-07-16 PROCEDURE — 84157 ASSAY OF PROTEIN OTHER: CPT | Performed by: EMERGENCY MEDICINE

## 2025-07-16 PROCEDURE — 82042 OTHER SOURCE ALBUMIN QUAN EA: CPT | Performed by: EMERGENCY MEDICINE

## 2025-07-16 PROCEDURE — 85004 AUTOMATED DIFF WBC COUNT: CPT | Performed by: EMERGENCY MEDICINE

## 2025-07-16 PROCEDURE — 250N000011 HC RX IP 250 OP 636: Performed by: EMERGENCY MEDICINE

## 2025-07-16 PROCEDURE — 83605 ASSAY OF LACTIC ACID: CPT | Performed by: EMERGENCY MEDICINE

## 2025-07-16 RX ORDER — SPIRONOLACTONE 25 MG/1
25 TABLET ORAL DAILY
Status: DISCONTINUED | OUTPATIENT
Start: 2025-07-17 | End: 2025-07-17 | Stop reason: HOSPADM

## 2025-07-16 RX ORDER — MAGNESIUM HYDROXIDE/ALUMINUM HYDROXICE/SIMETHICONE 120; 1200; 1200 MG/30ML; MG/30ML; MG/30ML
30 SUSPENSION ORAL EVERY 4 HOURS PRN
Status: DISCONTINUED | OUTPATIENT
Start: 2025-07-16 | End: 2025-07-17 | Stop reason: HOSPADM

## 2025-07-16 RX ORDER — AMOXICILLIN 250 MG
1 CAPSULE ORAL 2 TIMES DAILY PRN
Status: DISCONTINUED | OUTPATIENT
Start: 2025-07-16 | End: 2025-07-17 | Stop reason: HOSPADM

## 2025-07-16 RX ORDER — ACETAMINOPHEN 325 MG/1
650 TABLET ORAL EVERY 8 HOURS PRN
Status: DISCONTINUED | OUTPATIENT
Start: 2025-07-16 | End: 2025-07-17 | Stop reason: HOSPADM

## 2025-07-16 RX ORDER — POLYETHYLENE GLYCOL 3350 17 G/17G
17 POWDER, FOR SOLUTION ORAL 2 TIMES DAILY PRN
Status: DISCONTINUED | OUTPATIENT
Start: 2025-07-16 | End: 2025-07-17 | Stop reason: HOSPADM

## 2025-07-16 RX ORDER — ACETAMINOPHEN 650 MG/1
650 SUPPOSITORY RECTAL EVERY 8 HOURS PRN
Status: DISCONTINUED | OUTPATIENT
Start: 2025-07-16 | End: 2025-07-17 | Stop reason: HOSPADM

## 2025-07-16 RX ORDER — BISACODYL 10 MG
10 SUPPOSITORY, RECTAL RECTAL DAILY PRN
Status: DISCONTINUED | OUTPATIENT
Start: 2025-07-16 | End: 2025-07-17 | Stop reason: HOSPADM

## 2025-07-16 RX ORDER — MULTIPLE VITAMINS W/ MINERALS TAB 9MG-400MCG
1 TAB ORAL DAILY
Status: DISCONTINUED | OUTPATIENT
Start: 2025-07-17 | End: 2025-07-17 | Stop reason: HOSPADM

## 2025-07-16 RX ORDER — CALCIUM CARBONATE 500 MG/1
1000 TABLET, CHEWABLE ORAL 4 TIMES DAILY PRN
Status: DISCONTINUED | OUTPATIENT
Start: 2025-07-16 | End: 2025-07-17 | Stop reason: HOSPADM

## 2025-07-16 RX ORDER — FUROSEMIDE 20 MG/1
20 TABLET ORAL DAILY
Status: DISCONTINUED | OUTPATIENT
Start: 2025-07-17 | End: 2025-07-17 | Stop reason: HOSPADM

## 2025-07-16 RX ORDER — AMOXICILLIN 250 MG
2 CAPSULE ORAL 2 TIMES DAILY PRN
Status: DISCONTINUED | OUTPATIENT
Start: 2025-07-16 | End: 2025-07-17 | Stop reason: HOSPADM

## 2025-07-16 RX ORDER — GABAPENTIN 300 MG/1
300 CAPSULE ORAL 3 TIMES DAILY PRN
Status: DISCONTINUED | OUTPATIENT
Start: 2025-07-16 | End: 2025-07-17 | Stop reason: HOSPADM

## 2025-07-16 RX ORDER — LIDOCAINE 40 MG/G
CREAM TOPICAL
Status: DISCONTINUED | OUTPATIENT
Start: 2025-07-16 | End: 2025-07-17 | Stop reason: HOSPADM

## 2025-07-16 RX ORDER — ALBUMIN (HUMAN) 12.5 G/50ML
25-50 SOLUTION INTRAVENOUS ONCE
Status: COMPLETED | OUTPATIENT
Start: 2025-07-16 | End: 2025-07-16

## 2025-07-16 RX ORDER — IOPAMIDOL 755 MG/ML
90 INJECTION, SOLUTION INTRAVASCULAR ONCE
Status: COMPLETED | OUTPATIENT
Start: 2025-07-16 | End: 2025-07-16

## 2025-07-16 RX ADMIN — IOPAMIDOL 90 ML: 755 INJECTION, SOLUTION INTRAVENOUS at 10:36

## 2025-07-16 ASSESSMENT — ACTIVITIES OF DAILY LIVING (ADL)
ADLS_ACUITY_SCORE: 54

## 2025-07-16 ASSESSMENT — ENCOUNTER SYMPTOMS
COUGH: 0
NAUSEA: 0
ABDOMINAL DISTENTION: 1
ABDOMINAL PAIN: 0
SHORTNESS OF BREATH: 0
FEVER: 0
DIARRHEA: 0
VOMITING: 0
COLOR CHANGE: 1

## 2025-07-16 NOTE — H&P
[facility]    History and Physical - Hospitalist Service       Date of Admission:  2025  Malcom Chowdhury,  1950, MRN [id number]  PCP: [facility]    Assessment & Plan      Malcom Chowdhury is a 75 year old male admitted on 2025. He has history of alcoholism, mood disorder, recently hospitalized - found to have new diagnosis of bleeding esophageal varices and alcoholic cirrhosis, presents for evaluation of worsening jaundice.       # Recent diagnosis of alcoholic cirrhosis  # Alcoholic hepatitis- worsening  -new diagnosis of alcoholic cirrhosis during last hospital stay. Bilirubin was still climbing at time of discharge. Presents with severe jaundice, total bili now 26  -MELD-Na up to 30 now. Prognosis is worrisome if labs do not start to improve soon  -last EtOH use ~2 weeks ago  -Consult GI  -Per GI, steroid contraindicated given concerning WBC of 14  -possible worsening alcoholic hepatitis, SBP could also trigger worsening of labs. Ruling out SBP  -trend LFT, BMP, CBC, INR.  -no clear hepatic encephalopathy, no asterixis, mentation perhaps slightly slowed compared to previous. Monitor closely    #Portal hypertension  # Large volume ascites due to above  -recently started Lasix and spironolactone to control ascites  -diagnostic paracentesis done today with 3L removed (this is the max GI wanted removed)  -follow up fluid studies  -continue diuretics with hold parameters  -hold on abx pending cell count    #Leukocytosis  -as above r/o SBP  -no other s/s infection  -trend WBC    #Hyponatremia  -mild, likely due to hypoalbuminemia and cirrhosis  -trend    # Esophageal varices, recent banding 7/3  -no signs of ongoing bleeding  -Hgb higher than at recent discharge  -ok for oral diet  -due for followup EGD ~8/3     # Alcoholism  -last EtOH use ~  -states sobriety going well. He had one chem dep counseling session but did not like his counselor.  -recommend  schedule with new counselor ASAP after discharge. Patient wants to focus on medical issues first  -continue home vitamins    #RLS  - started on gabapentin during last hospital stay with much benefit  -he would like to change from TID to PRN dosing, reasonable     # Peripheral edema  - continue his home compression stockings  -follow up with his regular Vascular provider outpatient    Chronic stable issues:     # Recent diagnosis of chronic systolic heart failure  -recent Echocardiogram noting a mildly reduced LVEF 50 to 55%, with no prior echocardiogram available for review  -Suspect possibly myocarditis due to alcoholism.   -edema is better controlled now compared to discharge. Not in acute CHF  -recommend followup echo in 1-2 months while sober; if remains abnormal would have him see cardiology for eval     #Heart murmur  #Mild-moderate aortic stenosis  -followup echo as above    #9mm liver nodule  -MRI showed 9 mm hyperenhancing nodule in segment 4A of the liver  - Radiologist recommends follow-up MRI in 3 to 6 months      #Loose stools  -recent issue  -improved with fiber supplement  -continue fiber       DVT Prophylaxis: Moderate risk. Pharmacologic prophylaxis contraindicated due to coagulopathy  Diet: Combination Diet 2 gm NA Diet    Erazo Catheter: Not present  Lines: None     Cardiac Monitoring: None  Code Status: Full Code. Discussed and confirmed with patient. His wife Lynnette is his HCPOA. He brought his Advance Directive in to be scanned.    Clinically Significant Risk Factors Present on Admission         # Hyponatremia: Lowest Na = 127 mmol/L in last 2 days, will monitor as appropriate  # Hypochloremia: Lowest Cl = 95 mmol/L in last 2 days, will monitor as appropriate      # Hypoalbuminemia: Lowest albumin = 3 g/dL at 7/16/2025  9:42 AM, will monitor as appropriate    # Coagulation Defect: INR = 1.65 (Ref range: 0.85 - 1.15) and/or PTT = 42 Seconds (Ref range: 22 - 38 Seconds), will monitor for bleeding   "            # Obesity: Estimated body mass index is 37.49 kg/m  as calculated from the following:    Height as of 7/2/25: 1.753 m (5' 9.02\").    Weight as of this encounter: 115.2 kg (254 lb).         # Financial/Environmental Concerns:           Disposition Plan      Expected Discharge Date: 07/18/2025                The patient's care was discussed with the Patient.    Dulce Prieto MD  Hospitalist Service  [facility]  Securely message with [employer] (more info)  Text page via [employer] Paging/Directory     ______________________________________________________________________    Chief Complaint   jaundice    History is obtained from the patient    History of Present Illness   Malcom Chowdhury is a 75 year old male who is here for aforementioned symptoms.   Patient reports he is actually feeling pretty well.  He came in because his wife noticed his skin was becoming more yellow.  He had not been able to get into primary care for a follow-up lab check, therefore they went to urgent care.  Urgent care then called him that his bilirubin was very high and so he came into the ED.  Patient reports he has been noticing some abdominal distention and is happy he was able to get paracentesis done, he is more comfortable now.  His appetite has been good.  His stools have been firming up.  He has been sleeping well.  He denies any pain.  Denies shortness of breath.  No cough, fever, abdominal pain, nausea, vomiting, urinary issues.  He has not had alcohol since before his recent hospitalization.  He feel sobriety has been going well.  He did have 1 appointment with the chemical dependency counselor but did not have a good rapport with that individual and is not planning to see him any longer.  He states he has not had alcohol cravings and is not worried about relapse.      Past Medical History    Past Medical History:   Diagnosis Date    Alcoholic cirrhosis of liver with ascites (H)     Esophageal varices (H)  "    Peripheral edema     Restless legs syndrome (RLS)        Past Surgical History   History reviewed. No pertinent surgical history.    Prior to Admission Medications   Prior to Admission Medications   Prescriptions Last Dose Informant Patient Reported? Taking?   furosemide (LASIX) 20 MG tablet 7/15/2025 Morning  No Yes   Sig: Take 1 tablet (20 mg) by mouth daily.   gabapentin (NEURONTIN) 300 MG capsule 7/15/2025 Evening  No Yes   Sig: Take 1 capsule (300 mg) by mouth every 8 hours.   multivitamin w/minerals (THERA-VIT-M) tablet 7/15/2025 Morning  No Yes   Sig: Take 1 tablet by mouth daily.   psyllium (METAMUCIL/KONSYL) capsule 7/15/2025 Morning  No Yes   Sig: Take 1 capsule by mouth daily.   spironolactone (ALDACTONE) 25 MG tablet 7/15/2025 Morning  No Yes   Sig: Take 1 tablet (25 mg) by mouth daily.   thiamine (B-1) 100 MG tablet 7/15/2025 Morning  No Yes   Sig: Take 1 tablet (100 mg) by mouth daily.      Facility-Administered Medications: None        Social History   I have reviewed this patient's social history and updated it with pertinent information if needed.  Social History     Tobacco Use    Smoking status: Never   Substance Use Topics    Alcohol use: Not Currently    Drug use: Never        Physical Exam   Vital Signs: Temp: 98.3  F (36.8  C) Temp src: Oral BP: 98/64 Pulse: 76   Resp: 17 SpO2: 96 % O2 Device: None (Room air)    Weight: 254 lbs 0 oz  General: in no apparent distress, non-toxic, and alert male lying in hospital bed oriented x3  HEENT: Head normocephalic atraumatic, oral mucosa moist. Sclerae icteric  CV: Regular rhythm, normal rate, loud holosystolic murmur heard best LUSB  Resp: No wheezes, no rales or rhonchi, no focal consolidations  GI: Belly soft, nondistended, nontender, bowel sounds present  Skin: extremely jaundiced  Extremities: 1+ pitting edema bilateral ankles. Compression stockings in place  Psych: Normal affect, mood dysthymic  Neuro: Grossly normal. Slightly slowed mentation  compared to previous.    Medical Decision Making                 Data   Imaging results reviewed over the past 24 hrs:   Recent Results (from the past 24 hours)   CT Abdomen Pelvis w Contrast    Narrative    EXAM: CT ABDOMEN PELVIS W CONTRAST  LOCATION: [facility]  DATE: 7/16/2025    INDICATION: new onset jaundice, abd distension  COMPARISON: Liver MRI 7/3/2025; CT angiogram of the abdomen and pelvis 7/2/2025  TECHNIQUE: CT scan of the abdomen and pelvis was performed following injection of IV contrast. Multiplanar reformats were obtained. Dose reduction techniques were used.  CONTRAST: IsoVue 370 90mL    FINDINGS:   LOWER CHEST: Symmetric subpleural atelectasis in both lower lobes near the diaphragm. There are moderate degenerative calcifications of aortic valve leaflets. Small lower paraesophageal varices.    HEPATOBILIARY: Nodular liver contour and diffuse liver parenchymal heterogeneity consistent with cirrhosis/fibrosis and probable regenerative nodules. No calcified gallstones. No bile duct enlargement.    PANCREAS: Normal.    SPLEEN: Mildly enlarged spleen has been calcifications along the lateral capsule.    ADRENAL GLANDS: Normal.    KIDNEYS/BLADDER: Kidneys are normal in size with symmetric parenchymal enhancement and normal cortex thickness. No hydronephrosis or hydroureter. Urinary bladder is decompressed.    BOWEL:  Stomach is mostly decompressed. Centralization of small bowel in the anterior abdomen. Colon is mostly decompressed. No mucosal hyperenhancement or mechanical obstruction. Four-quadrant abdominal ascites. Mesenteric and omental hazy attenuation   consistent with edema/congestion. Normal contrast opacification of the mesenteric and portal veins.    LYMPH NODES: No new enlarged lymph nodes in the abdomen or pelvis.    VASCULATURE: Moderate patchy aortoiliac atheromatous calcifications.    PELVIC ORGANS: Prostate gland is normal in size. Seminal vesicles are  symmetric.    MUSCULOSKELETAL: There are contiguous degenerative osteophytes of the lower thoracic spine. Moderate osteophytes of the lumbar spine. No spondylolisthesis or compression deformity.      Impression    IMPRESSION:     Heterogeneous, nodular liver consistent with cirrhosis. No definite liver mass. Associated portal hypertension with splenomegaly, ascites, mesenteric congestion and portosystemic varices.     US Paracentesis with Albumin    Narrative    EXAM:  1. PARACENTESIS  2. ULTRASOUND GUIDANCE  LOCATION: [facility]  DATE: 7/16/2025    INDICATION: Ascites.    PROCEDURE: Informed consent obtained. Time out performed. The abdomen was prepped and draped in a sterile fashion. 10 mL of 1% lidocaine was infused into local soft tissues. A 5 Yoruba catheter system was introduced into the abdominal ascites under   ultrasound guidance.    3 liters of clear fluid were removed and sent to lab if requested.    Patient tolerated procedure well.    Ultrasound imaging was obtained and placed in the patient's permanent medical record.      Impression    IMPRESSION:  1.  Status post ultrasound-guided paracentesis.    Reference CPT Code: 26466     Recent Results (from the past 12 hours)   Basic metabolic panel    Collection Time: 07/16/25  9:42 AM   Result Value Ref Range    Sodium 127 (L) 135 - 145 mmol/L    Potassium 3.9 3.4 - 5.3 mmol/L    Chloride 95 (L) 98 - 107 mmol/L    Carbon Dioxide (CO2) 21 (L) 22 - 29 mmol/L    Anion Gap 11 7 - 15 mmol/L    Urea Nitrogen 14.9 8.0 - 23.0 mg/dL    Creatinine 0.93 0.67 - 1.17 mg/dL    GFR Estimate 87 >60 mL/min/1.73m2    Calcium 8.5 (L) 8.8 - 10.4 mg/dL    Glucose 133 (H) 70 - 99 mg/dL   Hepatic function panel    Collection Time: 07/16/25  9:42 AM   Result Value Ref Range    Protein Total 6.7 6.4 - 8.3 g/dL    Albumin 3.0 (L) 3.5 - 5.2 g/dL    Bilirubin Total 26.9 (HH) <=1.2 mg/dL    Alkaline Phosphatase 161 (H) 40 - 150 U/L     (H) 0 - 45 U/L     ALT 44 0 - 70 U/L    Bilirubin Direct 19.00 (H) 0.00 - 0.30 mg/dL   Lipase    Collection Time: 07/16/25  9:42 AM   Result Value Ref Range    Lipase 160 (H) 13 - 60 U/L   Extra Red Top Tube    Collection Time: 07/16/25  9:42 AM   Result Value Ref Range    Hold Specimen [facility]    Extra Green Top (Lithium Heparin) Tube    Collection Time: 07/16/25  9:42 AM   Result Value Ref Range    Hold Specimen [facility]    Extra Blood Bank Purple Top Tube    Collection Time: 07/16/25  9:42 AM   Result Value Ref Range    Hold Specimen C    CBC with platelets and differential    Collection Time: 07/16/25  9:42 AM   Result Value Ref Range    WBC Count 14.1 (H) 4.0 - 11.0 10e3/uL    RBC Count 3.68 (L) 4.40 - 5.90 10e6/uL    Hemoglobin 12.6 (L) 13.3 - 17.7 g/dL    Hematocrit 36.2 (L) 40.0 - 53.0 %    MCV 98 78 - 100 fL    MCH 34.2 (H) 26.5 - 33.0 pg    MCHC 34.8 31.5 - 36.5 g/dL    RDW 14.9 10.0 - 15.0 %    Platelet Count 330 150 - 450 10e3/uL    % Neutrophils 78 %    % Lymphocytes 8 %    % Monocytes 10 %    % Eosinophils 2 %    % Basophils 1 %    % Immature Granulocytes 1 %    NRBCs per 100 WBC 0 <1 /100    Absolute Neutrophils 11.0 (H) 1.6 - 8.3 10e3/uL    Absolute Lymphocytes 1.1 0.8 - 5.3 10e3/uL    Absolute Monocytes 1.4 (H) 0.0 - 1.3 10e3/uL    Absolute Eosinophils 0.3 0.0 - 0.7 10e3/uL    Absolute Basophils 0.2 0.0 - 0.2 10e3/uL    Absolute Immature Granulocytes 0.2 <=0.4 10e3/uL    Absolute NRBCs 0.0 10e3/uL   INR    Collection Time: 07/16/25 11:56 AM   Result Value Ref Range    INR 1.65 (H) 0.85 - 1.15    PT 19.6 (H) 11.8 - 14.8 Seconds   PTT    Collection Time: 07/16/25 11:56 AM   Result Value Ref Range    aPTT 42 (H) 22 - 38 Seconds   Lactic Acid Whole Blood with 1X Repeat in 2 HR when >2    Collection Time: 07/16/25 11:56 AM   Result Value Ref Range    Lactic Acid, Initial 1.9 0.7 - 2.0 mmol/L   Ascites Fluid Aerobic Bacterial Culture Routine With Gram Stain    Collection Time: 07/16/25  1:39 PM    Specimen: Peritoneum; Ascites  Fluid   Result Value Ref Range    Gram Stain Result No organisms seen    Cell Count Body Fluid    Collection Time: 07/16/25  1:39 PM   Result Value Ref Range    Color Yellow Colorless, Yellow    Clarity Clear Clear    Cell Count Fluid Source Abdomen     Total Nucleated Cells 251 /uL   Extra Body Fluid/CSF Collection    Collection Time: 07/16/25  1:39 PM   Result Value Ref Range    Hold Specimen [facility]

## 2025-07-16 NOTE — ED NOTES
Pt to bathroom and back with steady gait to void. Pt denies dizziness with position change and activity.

## 2025-07-16 NOTE — CONSULTS
[facility] Consultation     Malcom Chowdhury  [address]  [facility] [id number]  75 year old male     Admission Date/Time: 7/16/2025  Primary Care Provider: Rosa Alfaro  Referring / Attending Physician:  Ida     We were asked to see the patient in consultation for evaluation of jaundice.       CC: jaundice, abnormal labs     HPI:  Malcom Chowdhury is a 75 year old male with PMH ADHD, mood disorder, RLS, AUD, recent admission 7/2-7/7/25 for a new diagnosis of cirrhosis with ascites, esophageal varices, liver lesion, probable alcohol-related hepatitis not treated with steroids, new diagnosis of CHF, and aortic stenosis who presented to ER from urgent care for abnormal labs with an elevated bilirubin and elevated WBC. He reports that he has not had recurrence of shortness of breath after his initial paracentesis last admission but his abdomen has had ongoing distension. LE edema improved with compression stockings. He has been compliant with a low sodium diet and diuretics - furosemide 20 mg and spironolactone 25 mg daily. He states he had a very poor appetite in recent months but in the past two days it has really increased and he has been eating well. He denies recurrent melena or any hematochezia since last admission, stools are brown. He denies confusion. His jaundice was noticed by his wife who recommended UR visit yesterday because she read that gabapentin, which was started last admission for his restless leg syndrome, could cause it.     He denies etoh since last admission and states he has had no cravings or difficulty with this. He denies tobacco use, herbal supplements. There is family history of alcohol misuse but known cirrhosis or liver cancer.      ROS: A comprehensive ten point review of systems was negative aside from those in mentioned in the HPI.      PAST MEDICAL HISTORY:  Patient Active Problem List    Diagnosis Date Noted    Loose stools 07/07/2025     Priority: Medium     Restless legs syndrome (RLS) 07/07/2025     Priority: Medium    Secondary esophageal varices with bleeding (H) 07/04/2025     Priority: Medium    ABLA (acute blood loss anemia) 07/04/2025     Priority: Medium    Alcoholic cirrhosis of liver with ascites (H) 07/04/2025     Priority: Medium    Alcoholism (H) 07/04/2025     Priority: Medium    Lymphedema 07/04/2025     Priority: Medium    Chronic systolic heart failure (H) 07/04/2025     Priority: Medium    GI bleed 07/02/2025     Priority: Medium     SOCIAL HISTORY:   No tobacco, no etoh since June 2025.     FAMILY HISTORY:  History reviewed. No pertinent family history.  ALLERGIES:   Allergies   Allergen Reactions    Penicillins Unknown    Sulfa Antibiotics Unknown   No family history of cirrhosis or liver cancer.     MEDICATIONS:   Current Facility-Administered Medications   Medication Dose Route Frequency Provider Last Rate Last Admin    albumin human 25 % injection 25-50 g  25-50 g Intravenous Once Torie Pradhan MD         Current Outpatient Medications   Medication Sig Dispense Refill    furosemide (LASIX) 20 MG tablet Take 1 tablet (20 mg) by mouth daily. 30 tablet 0    gabapentin (NEURONTIN) 300 MG capsule Take 1 capsule (300 mg) by mouth every 8 hours. 90 capsule 0    multivitamin w/minerals (THERA-VIT-M) tablet Take 1 tablet by mouth daily. 100 tablet 0    psyllium (METAMUCIL/KONSYL) capsule Take 1 capsule by mouth daily. 90 capsule 3    spironolactone (ALDACTONE) 25 MG tablet Take 1 tablet (25 mg) by mouth daily. 30 tablet 0    thiamine (B-1) 100 MG tablet Take 1 tablet (100 mg) by mouth daily. 30 tablet 0     PHYSICAL EXAM:   /62   Pulse 89   Temp 99.5  F (37.5  C) (Temporal)   Resp 18   Wt 115.2 kg (254 lb)   SpO2 94%   BMI 37.49 kg/m     GEN: NAD, male appears stated age sitting up in bed  HEENT: + icterus, no lymphadenopathy  HRT: RRR, systolic murmur  LUNGS: CTA  ABD: distended, obese abdomen, +BS, semi-soft, nontender  SKIN:  deeply jaundiced, no rash  MSKL: b/l LE edema, strength 5/5 all 4 extrems  NEURO: Alert and oriented, appropriate mood and affect     ADDITIONAL DATA:   I reviewed the patient's new clinical lab test results.   Recent Labs   Lab Test 07/16/25  1156 07/16/25  0942 07/07/25  0646 07/06/25  0647   WBC  --  14.1* 6.7 6.5   HGB  --  12.6* 11.3* 10.9*   MCV  --  98 103* 104*   PLT  --  330 157 146*   INR 1.65*  --  1.70* 1.78*     Recent Labs   Lab Test 07/16/25  0942 07/07/25  0646 07/06/25  0647   POTASSIUM 3.9 3.7 3.6   CHLORIDE 95* 101 100   CO2 21* 28 28   BUN 14.9 7.7* 8.9   ANIONGAP 11 5* 6*     Recent Labs   Lab Test 07/16/25  0942 07/07/25  0646 07/06/25  0647 07/03/25  0635 07/02/25  1759   ALBUMIN 3.0* 2.8* 2.8*   < >  --    BILITOTAL 26.9* 11.6* 9.5*   < >  --    ALT 44 39 33   < >  --    * 124* 114*   < >  --    PROTEIN  --   --   --   --  Negative   LIPASE 160*  --   --   --   --     < > = values in this interval not displayed.     MELD 3.0: 27 at 7/16/2025 11:56 AM  MELD-Na: 30 at 7/16/2025 11:56 AM  Calculated from:  Serum Creatinine: 0.93 mg/dL (Using min of 1 mg/dL) at 7/16/2025  9:42 AM  Serum Sodium: 127 mmol/L at 7/16/2025  9:42 AM  Total Bilirubin: 26.9 mg/dL at 7/16/2025  9:42 AM  Serum Albumin: 3 g/dL at 7/16/2025  9:42 AM  INR(ratio): 1.65 at 7/16/2025 11:56 AM  Age at listing (hypothetical): 75 years  Sex: Male at 7/16/2025 11:56 AM    MDF - Control Prothrombin 13: 57.26 at 7/16/2025 11:56 AM  Calculated from:  Total Bilirubin: 26.9 mg/dL at 7/16/2025  9:42 AM  Prothrombin Time: 19.6 Seconds at 7/16/2025 11:56 AM  MDF = (4.6 * (PT - Control PT)) + Bilirubin     Hep B/C screen negative 7/4/25    Imaging results:  US-guided paracentesis 7/16/25:  IMPRESSION:  1.  Status post ultrasound-guided paracentesis with 3 L removed.     CT abdomen/pelvis 7/16/25:  FINDINGS:   LOWER CHEST: Symmetric subpleural atelectasis in both lower lobes near the diaphragm. There are moderate degenerative  calcifications of aortic valve leaflets. Small lower paraesophageal varices.  HEPATOBILIARY: Nodular liver contour and diffuse liver parenchymal heterogeneity consistent with cirrhosis/fibrosis and probable regenerative nodules. No calcified gallstones. No bile duct enlargement.  PANCREAS: Normal.  SPLEEN: Mildly enlarged spleen has been calcifications along the lateral capsule.  ADRENAL GLANDS: Normal.  KIDNEYS/BLADDER: Kidneys are normal in size with symmetric parenchymal enhancement and normal cortex thickness. No hydronephrosis or hydroureter. Urinary bladder is decompressed.  BOWEL:  Stomach is mostly decompressed. Centralization of small bowel in the anterior abdomen. Colon is mostly decompressed. No mucosal hyperenhancement or mechanical obstruction. Four-quadrant abdominal ascites. Mesenteric and omental hazy attenuation consistent with edema/congestion. Normal contrast opacification of the mesenteric and portal veins.  LYMPH NODES: No new enlarged lymph nodes in the abdomen or pelvis.  VASCULATURE: Moderate patchy aortoiliac atheromatous calcifications.  PELVIC ORGANS: Prostate gland is normal in size. Seminal vesicles are symmetric.  MUSCULOSKELETAL: There are contiguous degenerative osteophytes of the lower thoracic spine. Moderate osteophytes of the lumbar spine. No spondylolisthesis or compression deformity.                                                                   IMPRESSION:   Heterogeneous, nodular liver consistent with cirrhosis. No definite liver mass. Associated portal hypertension with splenomegaly, ascites, mesenteric congestion and portosystemic varices.    MRI liver 7/3/25:  FINDINGS: Moderate motion artifact.  LIVER: Nodular hepatic contour with  left hepatic lobe hypertrophy consistent with cirrhosis. 0.9 cm hyperenhancing nodule within segment 4A (image 25 series 11). 1 cm cyst within segment 2. Multiple, additional, small low T1, nonenhancing nodules likely representing multiple  regenerative nodules. Normal portal venous and hepatic venous enhancement.  ADDITIONAL FINDINGS: Mild splenomegaly. Spleen measures 15.9 cm in length. Normal splenic vein enhancement. Pancreas, adrenals and visualized portions of the kidneys are within its. Small to moderate amount of ascites within the upper abdomen. Normal   caliber visualized small bowel and colon. Several, stable mildly prominent peripancreatic and althea hepatis lymph nodes, which are likely reactive. Small paraesophageal varices.                                                                   IMPRESSION:  1.  Cirrhosis.  2.  0.9 cm hyperenhancing nodule within segment 4A. This represents a LI-RADS 3. Recommend follow-up MRI in 3-6 months.  3.  Mild splenomegaly.  4.  Small to moderate amount of ascites. Small periesophageal varices.    Procedure results:  EGD 7/3/25 ([facility]):  Findings:       Large (> 5 mm) varices were found in the lower third of the esophagus.        Five bands were successfully placed with complete eradication, resulting        in deflation of varices. There was no bleeding during and at the end of        the procedure.        Moderate portal hypertensive gastropathy was found in the entire        examined stomach.        The examined duodenum was normal.                                                                                    Impression:    - Large (> 5 mm) esophageal varices. Completely                          eradicated. Banded.                          - Portal hypertensive gastropathy.                          - Normal examined duodenum.                          - No specimens collected.   Recommendation:        - [facility].                          - Repeat upper endoscopy in 1 month for retreatment.         ASSESSMENT:    Alcohol-related cirrhosis with ascites  Esophageal varices  Jaundice/alcohol-related hepatitis  This is a 76 y/o male with PMH ADHD, mood disorder,  RLS, AUD, recent admission 7/2-7/7/25 for a new diagnosis of cirrhosis with ascites, esophageal varices, liver lesion, probable alcohol-related hepatitis not treated with steroids, new diagnosis of CHF, and aortic stenosis admitted today for worsening bilirubin and elevated WBC. He has not drank alcohol since last admission but likely he has alcohol-related hepatitis as cause for his lab derangements. Maddrey score is high but would not recommend steroid given elevated WBC and concern for infection. Diagnostic paracentesis preliminarily negative for SBP. No signs of HE or asterixis. No recurrent GI bleeding since last admission and hgb stable. MELD 3.0 is high at 27.     PLAN:  - Low sodium diet  - Await final paracentesis fluid analysis  - Hold diuretics for now  - Trend labs  - Outpatient EGD 8/8 and follow up visit 9/12 at [facility] as scheduled   - Continue to avoid alcohol      Xuan Call PA-C  [facility]  7/16/2025 12:33 PM  [phone] (office)    This case was discussed with Dr. Olivas who agrees to the above assessment and plan.    38 minutes of total time was spent today providing patient care, including patient evaluation, reviewing documentation/test result, and .   ________________________________________________________________________      GI staff addendum    The patient was seen and examined, I agree with the above assessment and plan by Xuan Gonzalez.  The patient is a 75 years old male with history of alcohol disorder, decompensated cirrhosis with ascites, esophageal varices (EGD with banding on 7/3/2025).  He comes to the hospital with jaundice and ongoing abdominal distention.  No fever or chills, no loss of consciousness.  He has been started on gabapentin recently for restless leg syndrome.    Physical exam:  Alert awake and oriented icteric  No acute distress  Vital stable  Abdomen: Full, nontender.  Preliminary ascitic tap negative for  SBP.  Assessment/Plan.  Alcohol-related cirrhosis with ascites, esophageal varices s/p banding.  Jaundice/elevated bilirubin/alcohol-related hepatitis.  Elevated WBC count--- no steroids at this time given elevated WBC count.  Low-sodium diet, trend LFTs.  Continue alcohol abstinence.  Agree with supportive care.  GI team will follow.    Total time spent was 25 minutes.    Vinny Olivas MD

## 2025-07-16 NOTE — PHARMACY-ADMISSION MEDICATION HISTORY
Pharmacist Admission Medication History    Admission medication history is complete. The information provided in this note is only as accurate as the sources available at the time of the update.    Information Source(s): Patient via in-person    Pertinent Information: Patient has been trying to stop using gabapentin since he is concern it is contributing to his jaundice. He noted he needed to take 1 dose yesterday for his restless legs.     Changes made to PTA medication list:  Added: None  Deleted: None  Changed: None    Allergies reviewed with patient and updates made in EHR: yes    Medication History Completed By: Elena Soto RPH 7/16/2025 11:37 AM    PTA Med List   Medication Sig Note Last Dose/Taking    furosemide (LASIX) 20 MG tablet Take 1 tablet (20 mg) by mouth daily.  7/15/2025 Morning    gabapentin (NEURONTIN) 300 MG capsule Take 1 capsule (300 mg) by mouth every 8 hours. 7/16/2025: Patient has been trying to stop.  7/15/2025 Evening    multivitamin w/minerals (THERA-VIT-M) tablet Take 1 tablet by mouth daily.  7/15/2025 Morning    psyllium (METAMUCIL/KONSYL) capsule Take 1 capsule by mouth daily.  7/15/2025 Morning    spironolactone (ALDACTONE) 25 MG tablet Take 1 tablet (25 mg) by mouth daily.  7/15/2025 Morning    thiamine (B-1) 100 MG tablet Take 1 tablet (100 mg) by mouth daily.  7/15/2025 Morning

## 2025-07-16 NOTE — ED PROVIDER NOTES
"    EMERGENCY DEPARTMENT ENCOUNTER      NAME: Malcom Chowdhury  AGE: 75 year old male  YOB: 1950  MRN: [id number]  EVALUATION DATE & TIME: No admission date for patient encounter.    PCP: Rosa Alfaro    ED PROVIDER: Torie Pradhan M.D.        Chief Complaint   Patient presents with    Abnormal Labs         FINAL IMPRESSION:    1. Hyponatremia    2. Jaundice    3. Ascites due to alcoholic cirrhosis (H)    4. Leukocytosis, unspecified type    5. Hyperbilirubinemia            MEDICAL DECISION MAKING:    Malcom Chowdhury is a 75 year old male with history of use, esophageal varices with bleeding, alcoholic cirrhosis with ascites, CHF, restless leg who presents to the ER with complaints of jaundice and elevated bilirubin.    Patient was hospitalized earlier this month and discharged on July 7, 2025 with GI bleed and alcoholic cirrhosis.  He states now starting yesterday or so that his wife noticed that he started to become jaundiced again.  They went to urgent care yesterday and were called today to come to the ER because of elevated bilirubin.  Also states that a test was sent to \"[facility] \" but he does not know what that test is.    Elevated around 27.  He is otherwise stable.  WBC now elevated.  CT imaging nonspecific.  Diagnostic paracentesis has been ordered.  GI does agree to allow 2-3 L to be removed but prefer no more.  This was conveyed to the ultrasound department and the patient.    This time is admission to the hospitalist service for ongoing management of this elevated bilirubin and SBP workup with paracentesis.  GI would like me to hold off on antibiotics until we get his labs back.    Patient has been accepted to the hospitalist service and will go to [facility] under inpatient status.      ED COURSE:  8:57 AM  I met with the patient to gather history and perform my exam. ED course and treatment discussed. This patient was taken to a private exam room setting while in the waiting " room during ED overcrowding. Patient gave verbal permission to be seen.     10:02 AM  Patient does not appear toxic or septic, but given everything that is going on and the elevated WBC that I did confirm with our laboratory today I have ordered a paracentesis.  Will also do CT abdomen to look for findings to explain obstruction for the bilirubin.  I updated patient about all this in private and he agrees with this plan.  He understands that he will most likely be admitted to the hospital but still waiting results for that final decision.    10:29 AM   Latest Reference Range & Units 07/06/25 06:47 07/07/25 06:46 07/16/25 09:42   Bilirubin Total <=1.2 mg/dL 9.5 (H) 11.6 (H) 26.9 (HH)       [facility] Everywhere:  Component 07/15/25   GLUCOSE 131 High     UREA NITROGEN (BUN) 14   CREATININE 1.18   EGFR 65   BUN/CREATININE RATIO SEE NOTE:    SODIUM 127 Low    POTASSIUM 3.9   CHLORIDE 97 Low    CARBON DIOXIDE 19 Low    CALCIUM 8.3 Low    PROTEIN, TOTAL 6.4   ALBUMIN 2.9 Low    GLOBULIN 3.5   ALBUMIN/GLOBULIN RATIO 0.8 Low    BILIRUBIN, TOTAL 27.9 High     ALKALINE PHOSPHATASE 152 High     High      12:30 PM  Spoke with Dr Olivas of [facility].  He agrees with paracentesis but if there is any concerns for SBP, especially given the elevated WBC, he would not want more than 2-3 L of ascites fluid removed.  I did speak with the ultrasound department about this and they understand and agree.  They will make note of this.  He is scheduled to come down at 1 PM.  [facility] does want him admitted to the hospital and they will participate in his care.    12:35 PM  Updated patient on plan for admission, my discussion with [facility], and the plan to do the paracentesis but only taken 2-3 L off if that much is present.  Not taking anymore.  He understands all this and agrees.  He agrees with the plan for admission to the hospital.  [facility] would like to await the paracentesis results before initiating antibiotics.    12:39 PM  Patient has  been accepted to the hospital hospitalist.  He is a bounce back to their service from recent discharge a few days ago.  Patient agrees with the plan for admission.  He will go to [facility] under inpatient status.    I considered ACS, PE, ruptured AAA, aortic dissection, bowel obstruction, bowel ischemia, cholecystitis, pancreatitis, appendicitis, diverticulitis, kidney stone, pyelonephritis, incarcerated or strangulated hernia, testicular torsion, viscus perforation, perforated GI ulcer, and other such etiologies at this time.      At the conclusion of the encounter I discussed the results of all of the tests and the disposition. Their questions were answered. The patient (and any family present) acknowledged understanding and were agreeable with the care plan.    CONSULTANTS:  Hospitalist - Dr. Ida GUADARRAMA GI - Dr. Olivas         MEDICATIONS GIVEN IN THE EMERGENCY:  Medications   albumin human 25 % injection 25-50 g (has no administration in time range)   iopamidol (ISOVUE-370) solution 90 mL (90 mLs Intravenous $Given 7/16/25 1036)           NEW PRESCRIPTIONS STARTED AT TODAY'S ER VISIT     Medication List      There are no discharge medications for this visit.             CONDITION:  stable        DISPOSITION:  Med surg  as accepted by Dr. Prieto, hospitalist         =================================================================  =================================================================  TRIAGE ASSESSMENT:  He had abnormal labs at urgent care. He has liver issues.    ED Triage Vitals [07/16/25 0834]   Encounter Vitals Group      /62      Systolic BP Percentile       Diastolic BP Percentile       Pulse 89      Resp 18      Temp 99.5  F (37.5  C)      Temp src Temporal      SpO2 94 %      Weight 115.2 kg (254 lb)     ================================================================  ================================================================    HPI    Patient information was obtained from:  "patient and family    Use of Intrepreter: N/A      Malcom Chowdhury is a 75 year old male with history of use, esophageal varices with bleeding, alcoholic cirrhosis with ascites, CHF, restless leg who presents to the ER with complaints of jaundice and elevated bilirubin.    Patient was hospitalized earlier this month and discharged on July 7, 2025 with GI bleed and alcoholic cirrhosis.  He states now starting yesterday or so that his wife noticed that he started to become jaundiced again.  They went to urgent care yesterday and were called today to come to the ER because of elevated bilirubin.  Also states that a test was sent to \"[place] \" but he does not know what that test is.    He otherwise denies fevers, cough, chest pain, shortness of breath, abdominal pain, vomiting or diarrhea.  He states he feels tight from the ascites but denies actual pain.  He states that in the hospital he had hoped to get 6 L off of him but were only able to drain 2.    It was they had not received a call today to come to the ER because of abnormal labs he would not have come today.    He states last alcohol use was July 1.      CHART REVIEW:  Julienne Camilo NP  PHYS- Nurse Practitioner  Specialty: Family Medicine     Telephone Encounter  Signed     Encounter Date: 7/16/2025  Note Received: 7/16/2025  8:32 AM        On call, critical value. Bilirubin 27.9.      Called patient. He feels \"perfectly fine\". I explained the critical value and the recommendation to go to the ED for further evaluation. He will head over now.  Julienne Camilo NP ....................  7/16/2025   6:06 AM          REVIEW OF SYSTEMS  Review of Systems   Constitutional:  Negative for fever.   Respiratory:  Negative for cough and shortness of breath.    Cardiovascular:  Negative for chest pain.   Gastrointestinal:  Positive for abdominal distention. Negative for abdominal pain, diarrhea, nausea and vomiting.   Skin:  Positive for color change (jaundiced " skin).   All other systems reviewed and are negative.      PAST MEDICAL HISTORY:  History reviewed. No pertinent past medical history.      PAST SURGICAL HISTORY:  History reviewed. No pertinent surgical history.      CURRENT MEDICATIONS:    Prior to Admission medications    Medication Sig Start Date End Date Taking? Authorizing Provider   furosemide (LASIX) 20 MG tablet Take 1 tablet (20 mg) by mouth daily. 7/7/25   Dulce Prieto MD   gabapentin (NEURONTIN) 300 MG capsule Take 1 capsule (300 mg) by mouth every 8 hours. 7/8/25   Dulce Prieto MD   multivitamin w/minerals (THERA-VIT-M) tablet Take 1 tablet by mouth daily. 7/7/25   Dulce Prieto MD   psyllium (METAMUCIL/KONSYL) capsule Take 1 capsule by mouth daily. 7/7/25 7/7/26  Dulce Prieto MD   spironolactone (ALDACTONE) 25 MG tablet Take 1 tablet (25 mg) by mouth daily. 7/7/25   Dulce Prieto MD   thiamine (B-1) 100 MG tablet Take 1 tablet (100 mg) by mouth daily. 7/7/25   Dulce Prieto MD         ALLERGIES:  Allergies   Allergen Reactions    Penicillins Unknown    Sulfa Antibiotics Unknown         FAMILY HISTORY:  History reviewed. No pertinent family history.      SOCIAL HISTORY:  Social History     Socioeconomic History    Marital status:      Social Drivers of Health     Financial Resource Strain: Low Risk  (7/15/2025)    Received from [facility]    Financial Resource Strain     Difficulty of Paying Living Expenses: 3   Food Insecurity: No Food Insecurity (7/15/2025)    Received from [facility]    Food Insecurity     Do you worry your food will run out before you are able to buy more?: 1   Transportation Needs: No Transportation Needs (7/15/2025)    Received from [facility]    Transportation Needs     Does lack of transportation keep you from medical appointments?: 1     Does lack of transportation keep you from work, meetings or getting things  that you need?: 1   Social Connections: Socially Integrated (7/15/2025)    Received from [facility]    Social Connections     Do you often feel lonely or isolated from those around you?: 0   Interpersonal Safety: Low Risk  (7/2/2025)    Interpersonal Safety     Do you feel physically and emotionally safe where you currently live?: Yes     Within the past 12 months, have you been hit, slapped, kicked or otherwise physically hurt by someone?: No     Within the past 12 months, have you been humiliated or emotionally abused in other ways by your partner or ex-partner?: No   Housing Stability: Low Risk  (7/15/2025)    Received from [facility]    Housing Stability     What is your housing situation today?: 1         VITALS:  Patient Vitals for the past 24 hrs:   BP Temp Temp src Pulse Resp SpO2 Weight   07/16/25 0834 131/62 99.5  F (37.5  C) Temporal 89 18 94 % 115.2 kg (254 lb)       Wt Readings from Last 3 Encounters:   07/16/25 115.2 kg (254 lb)   07/06/25 114.9 kg (253 lb 5 oz)       Estimated Creatinine Clearance: 88.6 mL/min (based on SCr of 0.93 mg/dL).    PHYSICAL EXAM    Constitutional:  Well developed, Well nourished, NAD  HENT:  Normocephalic, Atraumatic, Bilateral external ears normal, Nose normal. Neck- Supple, No stridor.   Eyes:  PERRL, EOMI, Conjunctiva icteric, no discharge.  Respiratory:  Normal breath sounds, No respiratory distress, No wheezing, Speaks full sentences easily. No cough.   Cardiovascular:  Normal heart rate, Regular rhythm, No murmurs , No rubs, No gallops. Chest wall nontender.   GI:  +obesity.  Bowel sounds normal, Soft, No tenderness, No masses, No flank tenderness. No rebound or guarding. +distension c/w ascites  : deferred  Musculoskeletal: +BLE edema. No cyanosis, No clubbing. Good range of motion in all major joints. No major deformities noted.   Integument:  Warm, Dry, No erythema, No rash.  No petechiae.  +jaundice appreciated  Neurologic:  Alert & oriented x 3, Grossly normal motor function, Grossly normal sensory function, No focal deficits noted. Normal gait.   Psychiatric:  Affect normal, Cooperative      LAB:  All pertinent labs reviewed and interpreted.  Recent Results (from the past 24 hours)   Basic metabolic panel    Collection Time: 07/16/25  9:42 AM   Result Value Ref Range    Sodium 127 (L) 135 - 145 mmol/L    Potassium 3.9 3.4 - 5.3 mmol/L    Chloride 95 (L) 98 - 107 mmol/L    Carbon Dioxide (CO2) 21 (L) 22 - 29 mmol/L    Anion Gap 11 7 - 15 mmol/L    Urea Nitrogen 14.9 8.0 - 23.0 mg/dL    Creatinine 0.93 0.67 - 1.17 mg/dL    GFR Estimate 87 >60 mL/min/1.73m2    Calcium 8.5 (L) 8.8 - 10.4 mg/dL    Glucose 133 (H) 70 - 99 mg/dL   Hepatic function panel    Collection Time: 07/16/25  9:42 AM   Result Value Ref Range    Protein Total 6.7 6.4 - 8.3 g/dL    Albumin 3.0 (L) 3.5 - 5.2 g/dL    Bilirubin Total 26.9 (HH) <=1.2 mg/dL    Alkaline Phosphatase 161 (H) 40 - 150 U/L     (H) 0 - 45 U/L    ALT 44 0 - 70 U/L    Bilirubin Direct 19.00 (H) 0.00 - 0.30 mg/dL   Lipase    Collection Time: 07/16/25  9:42 AM   Result Value Ref Range    Lipase 160 (H) 13 - 60 U/L   Extra Red Top Tube    Collection Time: 07/16/25  9:42 AM   Result Value Ref Range    Hold Specimen [facility]    Extra Green Top (Lithium Heparin) Tube    Collection Time: 07/16/25  9:42 AM   Result Value Ref Range    Hold Specimen [facility]    Extra Blood Bank Purple Top Tube    Collection Time: 07/16/25  9:42 AM   Result Value Ref Range    Hold Specimen [facility]    CBC with platelets and differential    Collection Time: 07/16/25  9:42 AM   Result Value Ref Range    WBC Count 14.1 (H) 4.0 - 11.0 10e3/uL    RBC Count 3.68 (L) 4.40 - 5.90 10e6/uL    Hemoglobin 12.6 (L) 13.3 - 17.7 g/dL    Hematocrit 36.2 (L) 40.0 - 53.0 %    MCV 98 78 - 100 fL    MCH 34.2 (H) 26.5 - 33.0 pg    MCHC 34.8 31.5 - 36.5 g/dL    RDW 14.9 10.0 - 15.0 %    Platelet Count 330 150 - 450  10e3/uL    % Neutrophils 78 %    % Lymphocytes 8 %    % Monocytes 10 %    % Eosinophils 2 %    % Basophils 1 %    % Immature Granulocytes 1 %    NRBCs per 100 WBC 0 <1 /100    Absolute Neutrophils 11.0 (H) 1.6 - 8.3 10e3/uL    Absolute Lymphocytes 1.1 0.8 - 5.3 10e3/uL    Absolute Monocytes 1.4 (H) 0.0 - 1.3 10e3/uL    Absolute Eosinophils 0.3 0.0 - 0.7 10e3/uL    Absolute Basophils 0.2 0.0 - 0.2 10e3/uL    Absolute Immature Granulocytes 0.2 <=0.4 10e3/uL    Absolute NRBCs 0.0 10e3/uL   INR    Collection Time: 07/16/25 11:56 AM   Result Value Ref Range    INR 1.65 (H) 0.85 - 1.15    PT 19.6 (H) 11.8 - 14.8 Seconds   PTT    Collection Time: 07/16/25 11:56 AM   Result Value Ref Range    aPTT 42 (H) 22 - 38 Seconds   Lactic Acid Whole Blood with 1X Repeat in 2 HR when >2    Collection Time: 07/16/25 11:56 AM   Result Value Ref Range    Lactic Acid, Initial 1.9 0.7 - 2.0 mmol/L       Lab Results   Component Value Date    ABORH A POS 07/02/2025           RADIOLOGY:  Reviewed all pertinent imaging. Please see official radiology report.    CT Abdomen Pelvis w Contrast   Final Result   IMPRESSION:       Heterogeneous, nodular liver consistent with cirrhosis. No definite liver mass. Associated portal hypertension with splenomegaly, ascites, mesenteric congestion and portosystemic varices.         US Paracentesis with Albumin    (Results Pending)         EKG:    none        PROCEDURES:  Paracentesis by Radiology department    Medical Decision Making  I obtained history from Family Member/Significant Other  I reviewed the EMR: Inpatient Record: see HPI and ED course  I discussed the care with another health care provider: see consults  Admit.    MIPS (CTPE, Dental pain, Erazo, Sinusitis, Asthma/COPD, Head Trauma): Not Applicable    SEPSIS: None    Torie Pradhan M.D. [facility]  Emergency Medicine and Medical Toxicology  Formerly [facility] EMERGENCY DEPARTMENT  [address]  [facility] [address]  [phone]  Dept: [phone]           Torie Pradhan MD  07/16/25 1240

## 2025-07-17 VITALS
DIASTOLIC BLOOD PRESSURE: 56 MMHG | WEIGHT: 254 LBS | OXYGEN SATURATION: 96 % | BODY MASS INDEX: 37.49 KG/M2 | SYSTOLIC BLOOD PRESSURE: 111 MMHG | HEART RATE: 84 BPM | TEMPERATURE: 97.7 F | RESPIRATION RATE: 18 BRPM

## 2025-07-17 LAB
ALBUMIN SERPL BCG-MCNC: 2.7 G/DL (ref 3.5–5.2)
ALP SERPL-CCNC: 153 U/L (ref 40–150)
ALT SERPL W P-5'-P-CCNC: 40 U/L (ref 0–70)
ANION GAP SERPL CALCULATED.3IONS-SCNC: 10 MMOL/L (ref 7–15)
AST SERPL W P-5'-P-CCNC: 101 U/L (ref 0–45)
BACTERIA FLD CULT: NORMAL
BACTERIA SPEC CULT: NORMAL
BACTERIA SPEC CULT: NORMAL
BILIRUB SERPL-MCNC: 24.8 MG/DL
BUN SERPL-MCNC: 15.3 MG/DL (ref 8–23)
CALCIUM SERPL-MCNC: 8.1 MG/DL (ref 8.8–10.4)
CHLORIDE SERPL-SCNC: 97 MMOL/L (ref 98–107)
CREAT SERPL-MCNC: 0.84 MG/DL (ref 0.67–1.17)
EGFRCR SERPLBLD CKD-EPI 2021: >90 ML/MIN/1.73M2
ERYTHROCYTE [DISTWIDTH] IN BLOOD BY AUTOMATED COUNT: 15 % (ref 10–15)
GLUCOSE SERPL-MCNC: 121 MG/DL (ref 70–99)
GRAM STAIN RESULT: NORMAL
GRAM STAIN RESULT: NORMAL
HCO3 SERPL-SCNC: 20 MMOL/L (ref 22–29)
HCT VFR BLD AUTO: 33 % (ref 40–53)
HGB BLD-MCNC: 11.9 G/DL (ref 13.3–17.7)
INR PPP: 1.75 (ref 0.85–1.15)
MCH RBC QN AUTO: 35.3 PG (ref 26.5–33)
MCHC RBC AUTO-ENTMCNC: 36.1 G/DL (ref 31.5–36.5)
MCV RBC AUTO: 98 FL (ref 78–100)
PLATELET # BLD AUTO: 263 10E3/UL (ref 150–450)
POTASSIUM SERPL-SCNC: 3.6 MMOL/L (ref 3.4–5.3)
PROT SERPL-MCNC: 6.2 G/DL (ref 6.4–8.3)
PROTHROMBIN TIME: 20.6 SECONDS (ref 11.8–14.8)
RBC # BLD AUTO: 3.37 10E6/UL (ref 4.4–5.9)
SODIUM SERPL-SCNC: 127 MMOL/L (ref 135–145)
WBC # BLD AUTO: 11.1 10E3/UL (ref 4–11)

## 2025-07-17 PROCEDURE — 85014 HEMATOCRIT: CPT | Performed by: HOSPITALIST

## 2025-07-17 PROCEDURE — 36415 COLL VENOUS BLD VENIPUNCTURE: CPT | Performed by: HOSPITALIST

## 2025-07-17 PROCEDURE — 99239 HOSP IP/OBS DSCHRG MGMT >30: CPT | Performed by: HOSPITALIST

## 2025-07-17 PROCEDURE — 80053 COMPREHEN METABOLIC PANEL: CPT | Performed by: HOSPITALIST

## 2025-07-17 PROCEDURE — 85610 PROTHROMBIN TIME: CPT | Performed by: HOSPITALIST

## 2025-07-17 PROCEDURE — G0378 HOSPITAL OBSERVATION PER HR: HCPCS

## 2025-07-17 PROCEDURE — 250N000013 HC RX MED GY IP 250 OP 250 PS 637: Performed by: HOSPITALIST

## 2025-07-17 RX ORDER — GABAPENTIN 300 MG/1
300 CAPSULE ORAL 3 TIMES DAILY PRN
COMMUNITY
Start: 2025-07-17

## 2025-07-17 RX ADMIN — FUROSEMIDE 20 MG: 20 TABLET ORAL at 09:32

## 2025-07-17 RX ADMIN — Medication 1 TABLET: at 09:32

## 2025-07-17 RX ADMIN — THIAMINE HCL TAB 100 MG 100 MG: 100 TAB at 09:32

## 2025-07-17 RX ADMIN — SPIRONOLACTONE 25 MG: 25 TABLET, FILM COATED ORAL at 09:32

## 2025-07-17 RX ADMIN — Medication 1 CAPSULE: at 09:32

## 2025-07-17 ASSESSMENT — ACTIVITIES OF DAILY LIVING (ADL)
ADLS_ACUITY_SCORE: 54

## 2025-07-17 NOTE — PROGRESS NOTES
Noted change to observation from inpatient. Went to give LR and patient already discharged from the ER. I did verify with his RN that they had indeed just discharged this patient. UR notified-Elmira Tejeda about his discharge prior to giving the MOON.

## 2025-07-17 NOTE — DISCHARGE SUMMARY
"[facility] MEDICINE  DISCHARGE SUMMARY     Primary Care Physician: [facility]  Admission Date: 7/16/2025   Discharge Provider: Dulce Prieto MD Discharge Date: 7/17/2025      Code Status: Full Code     Condition at Discharge: {CONDITION:466783::\"Stable\"}     REASON FOR PRESENTATION(See Admission Note for Details)   ***    PRINCIPAL & ACTIVE DISCHARGE DIAGNOSES     Principal Problem:    Jaundice  Active Problems:    Lymphedema    Chronic systolic heart failure (H)    Restless legs syndrome (RLS)    Hyperbilirubinemia    Hyponatremia    Ascites due to alcoholic cirrhosis (H)    Leukocytosis, unspecified type      PENDING LABS     Unresulted Labs Ordered in the Past 30 Days of this Admission       Date and Time Order Name Status Description    7/16/2025 10:20 AM Blood Culture Peripheral blood (BC) Arm, Right Preliminary     7/16/2025 10:20 AM Blood Culture Peripheral blood (BC) Arm, Left Preliminary     7/16/2025  9:58 AM Ascites Fluid Aerobic Bacterial Culture Routine With Gram Stain Preliminary             PROCEDURES ( this hospitalization only)          RECOMMENDATIONS TO OUTPATIENT PROVIDER FOR F/U VISIT     Follow-up Appointments       Follow Up      Follow up with [facility] as scheduled        Hospital Follow-up with Existing Primary Care Provider (PCP)          Schedule Primary Care visit within: 7 Days   Recommended labs and Imaging (to be ordered by Primary Care Provider): hepatic profile, INR, CBC, BMP               DISPOSITION     {Blank single:09254::\"Home with home care\",\"Skilled Nursing Facility\",\"Home with Hospice\",\"Skilled Nursing Facility with Hospice\",\"Acute Rehab\",\"Home\"}    SUMMARY OF HOSPITAL COURSE:      ***    Clinically Significant Risk Factors Present on Admission   { TIP  This section helps capture the illness of the patient on admission.     - Review diagnoses highlighted in blue; right click, edit & delete if not appropriate   - If blank, no " "additional diagnoses identified   :27598}      # Hyponatremia: Lowest Na = 127 mmol/L in last 2 days, will monitor as appropriate  # Hypochloremia: Lowest Cl = 95 mmol/L in last 2 days, will monitor as appropriate      # Hypoalbuminemia: Lowest albumin = 2.7 g/dL at 7/17/2025  7:03 AM, will monitor as appropriate  # Coagulation Defect: INR = 1.75 (Ref range: 0.85 - 1.15) and/or PTT = 42 Seconds (Ref range: 22 - 38 Seconds), will monitor for bleeding              # Obesity: Estimated body mass index is 37.49 kg/m  as calculated from the following:    Height as of 7/2/25: 1.753 m (5' 9.02\").    Weight as of this encounter: 115.2 kg (254 lb).       # Financial/Environmental Concerns:              Discharge Medications with Med changes:     Current Discharge Medication List        CONTINUE these medications which have CHANGED    Details   gabapentin (NEURONTIN) 300 MG capsule Take 1 capsule (300 mg) by mouth 3 times daily as needed (restless legs).    Associated Diagnoses: Restless legs syndrome (RLS)           CONTINUE these medications which have NOT CHANGED    Details   furosemide (LASIX) 20 MG tablet Take 1 tablet (20 mg) by mouth daily.  Qty: 30 tablet, Refills: 0    Associated Diagnoses: Alcoholic cirrhosis of liver with ascites (H)      multivitamin w/minerals (THERA-VIT-M) tablet Take 1 tablet by mouth daily.  Qty: 100 tablet, Refills: 0    Associated Diagnoses: Alcoholic cirrhosis of liver with ascites (H)      psyllium (METAMUCIL/KONSYL) capsule Take 1 capsule by mouth daily.  Qty: 90 capsule, Refills: 3    Comments: Pharmacy to dispense capsule size that is available.  Associated Diagnoses: Loose stools      spironolactone (ALDACTONE) 25 MG tablet Take 1 tablet (25 mg) by mouth daily.  Qty: 30 tablet, Refills: 0    Associated Diagnoses: Alcoholic cirrhosis of liver with ascites (H)      thiamine (B-1) 100 MG tablet Take 1 tablet (100 mg) by mouth daily.  Qty: 30 tablet, Refills: 0    Associated Diagnoses: " Alcoholic cirrhosis of liver with ascites (H)               Consults     None       Anticoagulation Information      Recent INR results:   Recent Labs   Lab 07/17/25  0703 07/16/25  1156   INR 1.75* 1.65*     Warfarin doses (if applicable) or name of other anticoagulant: ***      SIGNIFICANT IMAGING FINDINGS     Results for orders placed or performed during the hospital encounter of 07/16/25   US Paracentesis with Albumin    Impression    IMPRESSION:  1.  Status post ultrasound-guided paracentesis.    Reference CPT Code: 58929   CT Abdomen Pelvis w Contrast    Impression    IMPRESSION:     Heterogeneous, nodular liver consistent with cirrhosis. No definite liver mass. Associated portal hypertension with splenomegaly, ascites, mesenteric congestion and portosystemic varices.         SIGNIFICANT LABORATORY FINDINGS     {Choose Individual Labs to Include:312501}    ***    Discharge Orders        INR     CBC with platelets     Comprehensive metabolic panel (BMP + Alb, Alk Phos, ALT, AST, Total. Bili, TP)     Reason for your hospital stay    jaundice     Activity    Your activity upon discharge: activity as tolerated     When to contact your care team    Call your primary doctor if you have any of the following: chest pain, shortness of breath, fever, chills, fainting, dizziness, vomiting, constipation, dehydration, worsening pain, bleeding, or trouble urinating, or any other symptoms that are new or concerning to you.     Follow Up    Follow up with [facility] as scheduled     Discharge Instructions    [facility] should reach out to you about getting the labs rechecked. A backup option would be to come get them drawn at [facility], but at [facility] would be ideal since they will see the results sooner.     Diet    Follow this diet upon discharge: low salt diet     Hospital Follow-up with Existing Primary Care Provider (PCP)            Examination   Physical Exam   Temp:  [97.7  F (36.5  C)-98.8  F (37.1  C)] 97.7  F (36.5  C)  Pulse:   "[75-84] 84  Resp:  [16-18] 18  BP: ()/(56-68) 111/56  SpO2:  [94 %-97 %] 96 %  Wt Readings from Last 1 Encounters:   07/16/25 115.2 kg (254 lb)       General: {EXAM; GENERAL APPEARANCE:5021} male {Blank single:19197::\"standing at bedside\",\"sitting in bedside chair\",\"sitting on edge of bed\",\"lying in hospital bed\"} {Blank single:19197::\"unresponsive\",\"not oriented to person, place or time\",\"oriented to person and place\",\"oriented x3\"}  HEENT: Head normocephalic atraumatic, oral mucosa {Blank single:19197::\"dry\",\"tacky\",\"moist\"}. Sclerae {Blank single:19197::\"icteric\",\"anicteric\"}  CV: {Blank single:19197::\"Irregularly irregular rhythm\",\"Regular rhythm with PACs/PVCs\",\"Regular rhythm\"}, {Blank single:19197::\"bradycardic\",\"tachycardic\",\"normal rate\"}, {Blank single:19197::\"systolic crescendo/decrescendo murmur heard throughout the precordium\",\"soft holosystolic murmur heard best LUSB\",\"no murmurs\"}  Resp: {Blank single:19197::\"Scattered end expiratory wheezes\",\"Coarse expiratory wheezes throughout\",\"Breath sounds decreased bilateral bases\",\"No wheezes\"}, {Blank single:19197::\"fine rales bilateral bases\",\"no rales or rhonchi, no focal consolidations\"}  GI: Belly {Blank single:19197::\"firm\",\"soft\"}, {Blank single:19197::\"nondistended\"}, {Blank single:19197::\"mildly tender to palpation ***\",\"nontender\"}, bowel sounds {Blank single:19197::\"hypoactive\",\"absent\",\"present\"}  Skin: {Blank single:19197::\"Changes of stasis dermatitis bilateral shins\",\"Numerous ecchymoses\",\"No rashes or lesions\"}  Extremities: {Blank single:19197::\"Woody edema bilateral lower legs\",\"2+ pitting edema bilateral ankles\",\"1+ pitting edema bilateral ankles\",\"Trace ankle edema bilaterally\",\"No peripheral edema\"}  Psych: {Blank single:19197::\"Flat affect\",\"Normal affect\"}, {Blank single:19197::\"anxious mood\",\"irritable mood\",\"mood dysthymic\",\"mood euthymic\"}  Neuro: {Blank single:19197::\"Strength 5/5 BUE and BLE, sensation intact BUE and BLE, " "cranial nerves CNII-XII intact\",\"CNII-XII grossly intact, moving all 4 extremities\",\"Grossly normal\"}    Please see EMR for more detailed significant labs, imaging, consultant notes etc.    {How much time did you spend on discharge?:28946691}    Dulce Prieto MD  [facility]    CC:Clinic, [facility]    "

## 2025-07-17 NOTE — PLAN OF CARE
"  Problem: Adult Inpatient Plan of Care  Goal: Plan of Care Review  Description: The Plan of Care Review/Shift note should be completed every shift.  The Outcome Evaluation is a brief statement about your assessment that the patient is improving, declining, or no change.  This information will be displayed automatically on your shift  note.  Outcome: Progressing  Goal: Patient-Specific Goal (Individualized)  Description: You can add care plan individualizations to a care plan. Examples of Individualization might be:  \"Parent requests to be called daily at 9am for status\", \"I have a hard time hearing out of my right ear\", or \"Do not touch me to wake me up as it startles  me\".  Outcome: Progressing  Goal: Absence of Hospital-Acquired Illness or Injury  Outcome: Progressing  Intervention: Identify and Manage Fall Risk  Recent Flowsheet Documentation  Taken 7/17/2025 0000 by Lia Laguna RN  Safety Promotion/Fall Prevention:   clutter free environment maintained   nonskid shoes/slippers when out of bed   room near nurse's station  Taken 7/16/2025 2000 by Lia Laguna RN  Safety Promotion/Fall Prevention:   clutter free environment maintained   nonskid shoes/slippers when out of bed   room near nurse's station  Intervention: Prevent Skin Injury  Recent Flowsheet Documentation  Taken 7/17/2025 0000 by Lia Laguna RN  Body Position: position changed independently  Taken 7/16/2025 2000 by Lia Laguna RN  Body Position: position changed independently  Intervention: Prevent Infection  Recent Flowsheet Documentation  Taken 7/17/2025 0000 by Lia Laguna RN  Infection Prevention: hand hygiene promoted  Taken 7/16/2025 2000 by Lia Laguna RN  Infection Prevention: hand hygiene promoted  Goal: Optimal Comfort and Wellbeing  Outcome: Progressing  Goal: Readiness for Transition of Care  Outcome: Progressing     Problem: Delirium  Goal: Optimal Coping  Outcome: Progressing  Goal: Improved Behavioral " Control  Outcome: Progressing  Intervention: Minimize Safety Risk  Recent Flowsheet Documentation  Taken 7/17/2025 0000 by Lia Laguna RN  Enhanced Safety Measures:   pain management   review medications for side effects with activity  Taken 7/16/2025 2000 by Lia Laguna RN  Enhanced Safety Measures:   pain management   review medications for side effects with activity  Goal: Improved Attention and Thought Clarity  Outcome: Progressing  Goal: Improved Sleep  Outcome: Progressing   Goal Outcome Evaluation:  NURSING PROGRESS NOTE  Shift Summary      Date: July 17, 2025     Neuro/Musculoskeletal:  A&Ox4.   Cardiac:   VSS.     Respiratory:  Sating in the 90s on room air.  GI/:  Adequate urine output.  LBM: 7/16  Diet/Appetite:  Tolerating 2 gram sodium diet.  Activity:  IND   Pain:  Denies.   Skin:  No new deficits noted.   LDAs + Drips/IVF:  PIV RAC  Protocols/Labs:  CBC, CMP, and INR ordered this morning.    Pertinent Shift Updates:  Pt slept most of shift.  Per pt, takes pills whole with water.  No medications were given this shift.  Pt's last drink was 2 weeks ago per Dr. Gomez progress notes, so no CIWA assessments needed.  Pt has been appropriate.  Pt had paracentesis yesterday, and he had 3 liters removed.  Pt's ABD is still distended, but he feels better.        Plan:  Continue to monitor labs and ABD distention.  GI consulted.  Pt should discharge home when medically stable.      Lia Laguna RN  ....................................................

## 2025-07-17 NOTE — PROGRESS NOTES
[facility] Progress Note       SUBJECTIVE:  Patient feels good, abdomen feels much better today. Tolerating diet. Had a normal BM this AM.        OBJECTIVE:  /56   Pulse 84   Temp 97.7  F (36.5  C) (Oral)   Resp 18   Wt 115.2 kg (254 lb)   SpO2 96%   BMI 37.49 kg/m    Temp (24hrs), Av.2  F (36.8  C), Min:97.7  F (36.5  C), Max:98.8  F (37.1  C)    Patient Vitals for the past 72 hrs:   Weight   25 0834 115.2 kg (254 lb)       Intake/Output Summary (Last 24 hours) at 2025 1016  Last data filed at 2025 2200  Gross per 24 hour   Intake 240 ml   Output --   Net 240 ml        PHYSICAL EXAM  GEN: NAD, male appears stated age sitting up in chair eating breakfast  HRT: + LE edema  RESP: unlabored  ABD: distended, obese abdomen- nontender  SKIN: + jaundice, no rash      Additional Data:  I have reviewed the patient's new clinical lab results:     Recent Labs   Lab Test 25  0703 25  1156 25  0942 25  0646   WBC 11.1*  --  14.1* 6.7   HGB 11.9*  --  12.6* 11.3*   MCV 98  --  98 103*     --  330 157   INR 1.75* 1.65*  --  1.70*     Recent Labs   Lab Test 25  0703 25  0942 25  0646   POTASSIUM 3.6 3.9 3.7   CHLORIDE 97* 95* 101   CO2 20* 21* 28   BUN 15.3 14.9 7.7*   ANIONGAP 10 11 5*     Recent Labs   Lab Test 25  0703 25  0942 25  0646 25  0635 25  1759   ALBUMIN 2.7* 3.0* 2.8*   < >  --    BILITOTAL 24.8* 26.9* 11.6*   < >  --    ALT 40 44 39   < >  --    * 114* 124*   < >  --    PROTEIN  --   --   --   --  Negative   LIPASE  --  160*  --   --   --     < > = values in this interval not displayed.     MELD 3.0: 28 at 2025  7:03 AM  MELD-Na: 30 at 2025  7:03 AM  Calculated from:  Serum Creatinine: 0.84 mg/dL (Using min of 1 mg/dL) at 2025  7:03 AM  Serum Sodium: 127 mmol/L at 2025  7:03 AM  Total Bilirubin: 24.8 mg/dL at 2025  7:03 AM  Serum Albumin: 2.7 g/dL at 2025  7:03  AM  INR(ratio): 1.75 at 7/17/2025  7:03 AM  Age at listing (hypothetical): 75 years  Sex: Male at 7/17/2025  7:03 AM    Hep B/C screen negative 7/4/25       Imaging results:  US-guided paracentesis 7/16/25:  IMPRESSION:  1.  Status post ultrasound-guided paracentesis with 3 L removed, negative SBP.    Latest Reference Range & Units 07/16/25 13:39   Cell Count Fluid Source  Abdomen   Total Nucleated Cells /uL 251   Absolute Neutrophils, Body Fluid /uL 15.1   % Neutrophils Fluid % 6   % Lymphocytes Fluid % 10   % Mono/Macro Fluid % 79   % Lining Cells % 5   Color Fluid Colorless, Yellow  Yellow   Appearance Fluid Clear  Clear   Albumin Fluid Source  Abdomen   Albumin Fluid g/dL 0.5   Protein Fluid Source  Abdomen   Protein Total Fluid g/dL 0.9      CT abdomen/pelvis 7/16/25:  FINDINGS:   LOWER CHEST: Symmetric subpleural atelectasis in both lower lobes near the diaphragm. There are moderate degenerative calcifications of aortic valve leaflets. Small lower paraesophageal varices.  HEPATOBILIARY: Nodular liver contour and diffuse liver parenchymal heterogeneity consistent with cirrhosis/fibrosis and probable regenerative nodules. No calcified gallstones. No bile duct enlargement.  PANCREAS: Normal.  SPLEEN: Mildly enlarged spleen has been calcifications along the lateral capsule.  ADRENAL GLANDS: Normal.  KIDNEYS/BLADDER: Kidneys are normal in size with symmetric parenchymal enhancement and normal cortex thickness. No hydronephrosis or hydroureter. Urinary bladder is decompressed.  BOWEL:  Stomach is mostly decompressed. Centralization of small bowel in the anterior abdomen. Colon is mostly decompressed. No mucosal hyperenhancement or mechanical obstruction. Four-quadrant abdominal ascites. Mesenteric and omental hazy attenuation consistent with edema/congestion. Normal contrast opacification of the mesenteric and portal veins.  LYMPH NODES: No new enlarged lymph nodes in the abdomen or pelvis.  VASCULATURE: Moderate  patchy aortoiliac atheromatous calcifications.  PELVIC ORGANS: Prostate gland is normal in size. Seminal vesicles are symmetric.  MUSCULOSKELETAL: There are contiguous degenerative osteophytes of the lower thoracic spine. Moderate osteophytes of the lumbar spine. No spondylolisthesis or compression deformity.                                                                   IMPRESSION:   Heterogeneous, nodular liver consistent with cirrhosis. No definite liver mass. Associated portal hypertension with splenomegaly, ascites, mesenteric congestion and portosystemic varices.     MRI liver 7/3/25:  FINDINGS: Moderate motion artifact.  LIVER: Nodular hepatic contour with  left hepatic lobe hypertrophy consistent with cirrhosis. 0.9 cm hyperenhancing nodule within segment 4A (image 25 series 11). 1 cm cyst within segment 2. Multiple, additional, small low T1, nonenhancing nodules likely representing multiple regenerative nodules. Normal portal venous and hepatic venous enhancement.  ADDITIONAL FINDINGS: Mild splenomegaly. Spleen measures 15.9 cm in length. Normal splenic vein enhancement. Pancreas, adrenals and visualized portions of the kidneys are within its. Small to moderate amount of ascites within the upper abdomen. Normal   caliber visualized small bowel and colon. Several, stable mildly prominent peripancreatic and althea hepatis lymph nodes, which are likely reactive. Small paraesophageal varices.                                                                   IMPRESSION:  1.  Cirrhosis.  2.  0.9 cm hyperenhancing nodule within segment 4A. This represents a LI-RADS 3. Recommend follow-up MRI in 3-6 months.  3.  Mild splenomegaly.  4.  Small to moderate amount of ascites. Small periesophageal varices.    Procedure results:  EGD 7/3/25 ([facility]):  Findings:       Large (> 5 mm) varices were found in the lower third of the esophagus.        Five bands were successfully placed with complete eradication, resulting         in deflation of varices. There was no bleeding during and at the end of        the procedure.        Moderate portal hypertensive gastropathy was found in the entire        examined stomach.        The examined duodenum was normal.                                                                                    Impression:    - Large (> 5 mm) esophageal varices. Completely                          eradicated. Banded.                          - Portal hypertensive gastropathy.                          - Normal examined duodenum.                          - No specimens collected.   Recommendation:        - [facility].                          - Repeat upper endoscopy in 1 month for retreatment.        IMPRESSION:  Alcohol-related cirrhosis with ascites  Esophageal varices  Jaundice/alcohol-related hepatitis  This is a 76 y/o male with PMH ADHD, mood disorder, RLS, AUD, recent admission 7/2-7/7/25 for a new diagnosis of cirrhosis with ascites, esophageal varices, liver lesion, probable alcohol-related hepatitis not treated with steroids, new diagnosis of CHF, and aortic stenosis admitted 7/16 for worsening bilirubin and elevated WBC. He has not drank alcohol since last admission but likely he has alcohol-related hepatitis as cause for his lab derangements. Maddrey score is high but would not recommend steroids given elevated WBC and concern for infection, though no obvious source found. WBC improving today. Diagnostic paracentesis negative for SBP, kidney function is good with low-dose diuretics. No signs of HE or GI bleeding. MELD 3.0 is high at 28. He feels good now and I can have outpatient paracentesis orders set up for him through [facility].  D/w Hospitalist.      PLAN:  - Low sodium diet   - Continue home-dose diuretics  - Labs at [facility] in 1 week and if MDF still high with normal WBC I will start steroids  - Outpatient EGD 8/8 and follow up visit 9/12 at [facility] as scheduled - I  will also have [facility] clinical support nurses contact him next week and he can schedule outpatient weekly paracenteses as needed  - Continue to avoid alcohol  - Okay to discharge today per GI      (Dr. Olivas)  Xuan Call PA-C  [facility]  7/17/2025 10:16 AM  [phone] (office)    38 minutes of total time was spent providing patient care, including patient evaluation, reviewing documentation/test results, , and documentation.  ________________________________________________________________________

## 2025-07-17 NOTE — PLAN OF CARE
Goal Ou  Problem: Adult Inpatient Plan of Care  Goal: Absence of Hospital-Acquired Illness or Injury  Intervention: Prevent Skin Injury  Recent Flowsheet Documentation  Taken 7/17/2025 0938 by Gadiel Aggarwal RN  Body Position: position changed independently  Intervention: Prevent and Manage VTE (Venous Thromboembolism) Risk  Recent Flowsheet Documentation  Taken 7/17/2025 1017 by Gadiel Aggarwal RN  VTE Prevention/Management: compression stockings on  Intervention: Prevent Infection  Recent Flowsheet Documentation  Taken 7/17/2025 1017 by Gadiel Aggarwal RN  Infection Prevention:   hand hygiene promoted   rest/sleep promoted   single patient room provided  Goal: Optimal Comfort and Wellbeing  Outcome: Adequate for Care Transition     Problem: Delirium  Goal: Improved Behavioral Control  Outcome: Adequate for Care Transition  Goal: Improved Attention and Thought Clarity  Outcome: Adequate for Care Transition  Intervention: Maximize Cognitive Function  Recent Flowsheet Documentation  Taken 7/17/2025 1017 by Gadiel Aggarwal RN  Sensory Stimulation Regulation: television on  Reorientation Measures: clock in view     Patient is A/O x4. VSS. Skin remains very jaundiced. Patient denies pain. Discharge paperwork discussed at bedside and all questions answered. Patient transported off unit via wheelchair and brought home by spouse in automobile.

## 2025-07-17 NOTE — PLAN OF CARE
Problem: Adult Inpatient Plan of Care  Goal: Optimal Comfort and Wellbeing  Outcome: Progressing  Pt up independent. Reporting abdominal fullness, slightly better since paracentesis. Tolerating regular diet. Denies pain.

## 2025-07-19 ENCOUNTER — PATIENT OUTREACH (OUTPATIENT)
Dept: CARE COORDINATION | Facility: CLINIC | Age: 75
End: 2025-07-19
Payer: COMMERCIAL

## 2025-07-19 NOTE — PROGRESS NOTES
Connected Care Resource Center:   [facility] Contact  [facility]/Voicemail     Clinical Data: Post-Discharge Outreach     Outreach attempted x 2.  Left message on patient's voicemail, providing [facility]'s central phone number of [phone] ([phone]) for questions/concerns and/or to schedule an appt with an [facility] provider, if they do not have a PCP.      Plan:  [facility] will do no further outreaches at this time.       Gardenia Alonso MA  Connected [facility] Resource Center, [facility]    *Connected Care Resource Team does NOT follow patient ongoing. Referrals are identified based on internal discharge reports and the outreach is to ensure patient has an understanding of their discharge instructions.

## 2025-07-20 ENCOUNTER — HEALTH MAINTENANCE LETTER (OUTPATIENT)
Age: 75
End: 2025-07-20

## 2025-07-21 LAB
BACTERIA FLD CULT: NO GROWTH
BACTERIA SPEC CULT: NO GROWTH
BACTERIA SPEC CULT: NO GROWTH
GRAM STAIN RESULT: NORMAL
GRAM STAIN RESULT: NORMAL

## 2025-07-31 ENCOUNTER — HOSPITAL ENCOUNTER (INPATIENT)
Facility: HOSPITAL | Age: 75
End: 2025-07-31
Attending: EMERGENCY MEDICINE
Payer: COMMERCIAL

## 2025-07-31 ENCOUNTER — APPOINTMENT (OUTPATIENT)
Dept: CT IMAGING | Facility: HOSPITAL | Age: 75
DRG: 432 | End: 2025-07-31
Attending: EMERGENCY MEDICINE
Payer: COMMERCIAL

## 2025-07-31 VITALS
OXYGEN SATURATION: 94 % | SYSTOLIC BLOOD PRESSURE: 115 MMHG | HEART RATE: 77 BPM | DIASTOLIC BLOOD PRESSURE: 58 MMHG | RESPIRATION RATE: 23 BRPM | TEMPERATURE: 97.9 F

## 2025-07-31 DIAGNOSIS — E87.1 HYPONATREMIA: ICD-10-CM

## 2025-07-31 DIAGNOSIS — R65.10 SIRS (SYSTEMIC INFLAMMATORY RESPONSE SYNDROME) (H): ICD-10-CM

## 2025-07-31 DIAGNOSIS — I89.0 LYMPHEDEMA: ICD-10-CM

## 2025-07-31 DIAGNOSIS — R14.0 ABDOMINAL DISTENSION: Primary | ICD-10-CM

## 2025-07-31 DIAGNOSIS — K70.31 ASCITES DUE TO ALCOHOLIC CIRRHOSIS (H): ICD-10-CM

## 2025-07-31 DIAGNOSIS — N17.9 ACUTE KIDNEY INJURY: ICD-10-CM

## 2025-07-31 DIAGNOSIS — G25.81 RESTLESS LEGS SYNDROME (RLS): ICD-10-CM

## 2025-07-31 DIAGNOSIS — R06.00 DYSPNEA, UNSPECIFIED TYPE: ICD-10-CM

## 2025-07-31 PROBLEM — K72.10 END STAGE LIVER DISEASE (H): Status: ACTIVE | Noted: 2025-07-31

## 2025-07-31 PROBLEM — I50.32 CHRONIC DIASTOLIC HEART FAILURE (H): Status: ACTIVE | Noted: 2025-07-31

## 2025-07-31 PROBLEM — A41.9 SEPSIS (H): Status: ACTIVE | Noted: 2025-07-31

## 2025-07-31 LAB
ALBUMIN SERPL BCG-MCNC: 2.5 G/DL (ref 3.5–5.2)
ALP SERPL-CCNC: 166 U/L (ref 40–150)
ALT SERPL W P-5'-P-CCNC: 84 U/L (ref 0–70)
AMMONIA PLAS-SCNC: 25 UMOL/L (ref 16–60)
ANION GAP SERPL CALCULATED.3IONS-SCNC: 14 MMOL/L (ref 7–15)
AST SERPL W P-5'-P-CCNC: 135 U/L (ref 0–45)
BASOPHILS # BLD AUTO: 0 10E3/UL (ref 0–0.2)
BASOPHILS NFR BLD AUTO: 0 %
BILIRUB SERPL-MCNC: 26.5 MG/DL
BUN SERPL-MCNC: 76.1 MG/DL (ref 8–23)
CALCIUM SERPL-MCNC: 8.4 MG/DL (ref 8.8–10.4)
CHLORIDE SERPL-SCNC: 86 MMOL/L (ref 98–107)
CREAT SERPL-MCNC: 3.89 MG/DL (ref 0.67–1.17)
EGFRCR SERPLBLD CKD-EPI 2021: 16 ML/MIN/1.73M2
EOSINOPHIL # BLD AUTO: 0 10E3/UL (ref 0–0.7)
EOSINOPHIL NFR BLD AUTO: 0 %
ERYTHROCYTE [DISTWIDTH] IN BLOOD BY AUTOMATED COUNT: 14.5 % (ref 10–15)
GLUCOSE SERPL-MCNC: 190 MG/DL (ref 70–99)
HCO3 SERPL-SCNC: 14 MMOL/L (ref 22–29)
HCT VFR BLD AUTO: 33.9 % (ref 40–53)
HGB BLD-MCNC: 12.1 G/DL (ref 13.3–17.7)
IMM GRANULOCYTES # BLD: 0.2 10E3/UL
IMM GRANULOCYTES NFR BLD: 1 %
INR PPP: 1.91 (ref 0.85–1.15)
LACTATE SERPL-SCNC: 2.8 MMOL/L (ref 0.7–2)
LIPASE SERPL-CCNC: 212 U/L (ref 13–60)
LYMPHOCYTES # BLD AUTO: 0.3 10E3/UL (ref 0.8–5.3)
LYMPHOCYTES NFR BLD AUTO: 2 %
MCH RBC QN AUTO: 33.8 PG (ref 26.5–33)
MCHC RBC AUTO-ENTMCNC: 35.7 G/DL (ref 31.5–36.5)
MCV RBC AUTO: 95 FL (ref 78–100)
MONOCYTES # BLD AUTO: 0.7 10E3/UL (ref 0–1.3)
MONOCYTES NFR BLD AUTO: 4 %
NEUTROPHILS # BLD AUTO: 14.1 10E3/UL (ref 1.6–8.3)
NEUTROPHILS NFR BLD AUTO: 92 %
NRBC # BLD AUTO: 0 10E3/UL
NRBC BLD AUTO-RTO: 0 /100
PLATELET # BLD AUTO: 277 10E3/UL (ref 150–450)
POTASSIUM SERPL-SCNC: 5.3 MMOL/L (ref 3.4–5.3)
PROT SERPL-MCNC: 6.2 G/DL (ref 6.4–8.3)
PROTHROMBIN TIME: 22 SECONDS (ref 11.8–14.8)
RBC # BLD AUTO: 3.58 10E6/UL (ref 4.4–5.9)
SODIUM SERPL-SCNC: 114 MMOL/L (ref 135–145)
WBC # BLD AUTO: 15.3 10E3/UL (ref 4–11)

## 2025-07-31 PROCEDURE — 85610 PROTHROMBIN TIME: CPT | Performed by: EMERGENCY MEDICINE

## 2025-07-31 PROCEDURE — 120N000001 HC R&B MED SURG/OB

## 2025-07-31 PROCEDURE — 93005 ELECTROCARDIOGRAM TRACING: CPT | Performed by: EMERGENCY MEDICINE

## 2025-07-31 PROCEDURE — 85018 HEMOGLOBIN: CPT | Performed by: EMERGENCY MEDICINE

## 2025-07-31 PROCEDURE — 74176 CT ABD & PELVIS W/O CONTRAST: CPT

## 2025-07-31 PROCEDURE — 84155 ASSAY OF PROTEIN SERUM: CPT | Performed by: EMERGENCY MEDICINE

## 2025-07-31 PROCEDURE — 80051 ELECTROLYTE PANEL: CPT | Performed by: EMERGENCY MEDICINE

## 2025-07-31 PROCEDURE — 85014 HEMATOCRIT: CPT | Performed by: EMERGENCY MEDICINE

## 2025-07-31 PROCEDURE — 36415 COLL VENOUS BLD VENIPUNCTURE: CPT | Performed by: EMERGENCY MEDICINE

## 2025-07-31 PROCEDURE — 82140 ASSAY OF AMMONIA: CPT | Performed by: EMERGENCY MEDICINE

## 2025-07-31 PROCEDURE — 99223 1ST HOSP IP/OBS HIGH 75: CPT | Performed by: HOSPITALIST

## 2025-07-31 PROCEDURE — 83690 ASSAY OF LIPASE: CPT | Performed by: EMERGENCY MEDICINE

## 2025-07-31 PROCEDURE — 99285 EMERGENCY DEPT VISIT HI MDM: CPT | Mod: 25 | Performed by: EMERGENCY MEDICINE

## 2025-07-31 PROCEDURE — 83605 ASSAY OF LACTIC ACID: CPT | Performed by: EMERGENCY MEDICINE

## 2025-07-31 RX ORDER — MULTIPLE VITAMINS W/ MINERALS TAB 9MG-400MCG
1 TAB ORAL DAILY
Status: ACTIVE | OUTPATIENT
Start: 2025-08-01

## 2025-07-31 RX ORDER — CALCIUM CARBONATE 500 MG/1
1000 TABLET, CHEWABLE ORAL 4 TIMES DAILY PRN
Status: ACTIVE | OUTPATIENT
Start: 2025-07-31

## 2025-07-31 RX ORDER — ONDANSETRON 4 MG/1
4 TABLET, ORALLY DISINTEGRATING ORAL EVERY 6 HOURS PRN
Status: ACTIVE | OUTPATIENT
Start: 2025-07-31

## 2025-07-31 RX ORDER — LIDOCAINE 40 MG/G
CREAM TOPICAL
Status: ACTIVE | OUTPATIENT
Start: 2025-07-31

## 2025-07-31 RX ORDER — ONDANSETRON 2 MG/ML
4 INJECTION INTRAMUSCULAR; INTRAVENOUS EVERY 6 HOURS PRN
Status: ACTIVE | OUTPATIENT
Start: 2025-07-31

## 2025-07-31 RX ORDER — PIPERACILLIN SODIUM, TAZOBACTAM SODIUM 4; .5 G/20ML; G/20ML
4.5 INJECTION, POWDER, LYOPHILIZED, FOR SOLUTION INTRAVENOUS EVERY 6 HOURS
OUTPATIENT
Start: 2025-08-01

## 2025-07-31 RX ORDER — HYDROMORPHONE HCL IN WATER/PF 6 MG/30 ML
0.2 PATIENT CONTROLLED ANALGESIA SYRINGE INTRAVENOUS
Status: ACTIVE | OUTPATIENT
Start: 2025-07-31

## 2025-07-31 RX ORDER — SODIUM CHLORIDE 9 MG/ML
INJECTION, SOLUTION INTRAVENOUS CONTINUOUS
Status: ACTIVE | OUTPATIENT
Start: 2025-08-01

## 2025-07-31 RX ORDER — HYDROMORPHONE HCL IN WATER/PF 6 MG/30 ML
0.4 PATIENT CONTROLLED ANALGESIA SYRINGE INTRAVENOUS
Status: ACTIVE | OUTPATIENT
Start: 2025-07-31

## 2025-07-31 RX ORDER — AMOXICILLIN 250 MG
2 CAPSULE ORAL 2 TIMES DAILY PRN
Status: ACTIVE | OUTPATIENT
Start: 2025-07-31

## 2025-07-31 RX ORDER — GABAPENTIN 300 MG/1
300 CAPSULE ORAL 3 TIMES DAILY PRN
Status: ACTIVE | OUTPATIENT
Start: 2025-07-31

## 2025-07-31 RX ORDER — AMOXICILLIN 250 MG
1 CAPSULE ORAL 2 TIMES DAILY PRN
Status: ACTIVE | OUTPATIENT
Start: 2025-07-31

## 2025-07-31 RX ORDER — ALBUMIN (HUMAN) 12.5 G/50ML
25 SOLUTION INTRAVENOUS ONCE
Status: DISPENSED | OUTPATIENT
Start: 2025-08-01

## 2025-07-31 ASSESSMENT — ACTIVITIES OF DAILY LIVING (ADL)
ADLS_ACUITY_SCORE: 54
ADLS_ACUITY_SCORE: 54

## 2025-07-31 ASSESSMENT — COLUMBIA-SUICIDE SEVERITY RATING SCALE - C-SSRS
1. IN THE PAST MONTH, HAVE YOU WISHED YOU WERE DEAD OR WISHED YOU COULD GO TO SLEEP AND NOT WAKE UP?: NO
2. HAVE YOU ACTUALLY HAD ANY THOUGHTS OF KILLING YOURSELF IN THE PAST MONTH?: NO
6. HAVE YOU EVER DONE ANYTHING, STARTED TO DO ANYTHING, OR PREPARED TO DO ANYTHING TO END YOUR LIFE?: NO

## 2025-08-01 ENCOUNTER — APPOINTMENT (OUTPATIENT)
Dept: ULTRASOUND IMAGING | Facility: HOSPITAL | Age: 75
DRG: 432 | End: 2025-08-01
Attending: EMERGENCY MEDICINE
Payer: COMMERCIAL

## 2025-08-01 PROBLEM — E87.20 METABOLIC ACIDEMIA: Status: ACTIVE | Noted: 2025-08-01

## 2025-08-01 PROBLEM — I85.10 SECONDARY ESOPHAGEAL VARICES WITHOUT BLEEDING (H): Status: ACTIVE | Noted: 2025-08-01

## 2025-08-01 PROBLEM — K76.6 PORTAL HYPERTENSION (H): Status: ACTIVE | Noted: 2025-08-01

## 2025-08-01 PROBLEM — R14.0 ABDOMINAL DISTENSION: Status: ACTIVE | Noted: 2025-08-01

## 2025-08-01 LAB
ALBUMIN BODY FLUID SOURCE: NORMAL
ALBUMIN FLD-MCNC: 0.4 G/DL
ALBUMIN SERPL BCG-MCNC: 2.7 G/DL (ref 3.5–5.2)
ALBUMIN UR-MCNC: NEGATIVE MG/DL
ALP SERPL-CCNC: 155 U/L (ref 40–150)
ALT SERPL W P-5'-P-CCNC: 83 U/L (ref 0–70)
ANION GAP SERPL CALCULATED.3IONS-SCNC: 16 MMOL/L (ref 7–15)
APPEARANCE FLD: ABNORMAL
APPEARANCE UR: CLEAR
AST SERPL W P-5'-P-CCNC: 131 U/L (ref 0–45)
ATRIAL RATE - MUSE: 75 BPM
BACTERIA #/AREA URNS HPF: ABNORMAL /HPF
BASE EXCESS BLDV CALC-SCNC: -9.1 MMOL/L (ref -3–3)
BILIRUB SERPL-MCNC: 28.2 MG/DL
BILIRUB UR QL STRIP: ABNORMAL
BUN SERPL-MCNC: 76.8 MG/DL (ref 8–23)
CALCIUM SERPL-MCNC: 8.5 MG/DL (ref 8.8–10.4)
CHLORIDE SERPL-SCNC: 86 MMOL/L (ref 98–107)
CHOLEST SERPL-MCNC: ABNORMAL MG/DL
COLOR FLD: YELLOW
COLOR UR AUTO: ABNORMAL
CREAT SERPL-MCNC: 3.77 MG/DL (ref 0.67–1.17)
CREAT UR-MCNC: 79.3 MG/DL
DIASTOLIC BLOOD PRESSURE - MUSE: 56 MMHG
EGFRCR SERPLBLD CKD-EPI 2021: 16 ML/MIN/1.73M2
ERYTHROCYTE [DISTWIDTH] IN BLOOD BY AUTOMATED COUNT: 14.5 % (ref 10–15)
FRACT EXCRET NA UR+SERPL-RTO: 0.8 %
GLUCOSE SERPL-MCNC: 158 MG/DL (ref 70–99)
GLUCOSE UR STRIP-MCNC: NEGATIVE MG/DL
HCO3 BLDV-SCNC: 17 MMOL/L (ref 21–28)
HCO3 SERPL-SCNC: 15 MMOL/L (ref 22–29)
HCT VFR BLD AUTO: 32.7 % (ref 40–53)
HDLC SERPL-MCNC: 9 MG/DL
HGB BLD-MCNC: 11.6 G/DL (ref 13.3–17.7)
HGB UR QL STRIP: NEGATIVE
INR PPP: 1.92 (ref 0.85–1.15)
INTERPRETATION ECG - MUSE: NORMAL
KETONES UR STRIP-MCNC: NEGATIVE MG/DL
LACTATE SERPL-SCNC: 3.1 MMOL/L (ref 0.7–2)
LACTATE SERPL-SCNC: 3.4 MMOL/L (ref 0.7–2)
LDLC SERPL CALC-MCNC: ABNORMAL MG/DL
LEUKOCYTE ESTERASE UR QL STRIP: NEGATIVE
MCH RBC QN AUTO: 33.7 PG (ref 26.5–33)
MCHC RBC AUTO-ENTMCNC: 35.5 G/DL (ref 31.5–36.5)
MCV RBC AUTO: 95 FL (ref 78–100)
NITRATE UR QL: NEGATIVE
NONHDLC SERPL-MCNC: ABNORMAL MG/DL
O2/TOTAL GAS SETTING VFR VENT: 21 %
OSMOLALITY SERPL: 282 MMOL/KG (ref 280–301)
OSMOLALITY UR: 338 MMOL/KG (ref 100–1200)
OXYHGB MFR BLDV: 27 % (ref 70–75)
P AXIS - MUSE: 53 DEGREES
PCO2 BLDV: 37 MM HG (ref 40–50)
PH BLDV: 7.28 [PH] (ref 7.32–7.43)
PH UR STRIP: 5 [PH] (ref 5–7)
PLATELET # BLD AUTO: 240 10E3/UL (ref 150–450)
PO2 BLDV: 23 MM HG (ref 25–47)
POTASSIUM SERPL-SCNC: 4.7 MMOL/L (ref 3.4–5.3)
PR INTERVAL - MUSE: 256 MS
PROCALCITONIN SERPL IA-MCNC: NORMAL NG/ML
PROT FLD-MCNC: 0.7 G/DL
PROT SERPL-MCNC: 6.1 G/DL (ref 6.4–8.3)
PROTEIN BODY FLUID SOURCE: NORMAL
PROTHROMBIN TIME: 22.1 SECONDS (ref 11.8–14.8)
QRS DURATION - MUSE: 96 MS
QT - MUSE: 432 MS
QTC - MUSE: 479 MS
R AXIS - MUSE: 110 DEGREES
RBC # BLD AUTO: 3.44 10E6/UL (ref 4.4–5.9)
RBC URINE: 1 /HPF
SAO2 % BLDV: 27.2 % (ref 70–75)
SODIUM SERPL-SCNC: 115 MMOL/L (ref 135–145)
SODIUM SERPL-SCNC: 116 MMOL/L (ref 135–145)
SODIUM SERPL-SCNC: 117 MMOL/L (ref 135–145)
SODIUM UR-SCNC: 20 MMOL/L
SP GR UR STRIP: 1.01 (ref 1–1.03)
SPECIMEN SOURCE FLD: ABNORMAL
SQUAMOUS EPITHELIAL: <1 /HPF
SYSTOLIC BLOOD PRESSURE - MUSE: 114 MMHG
T AXIS - MUSE: -3 DEGREES
TRIGL SERPL-MCNC: ABNORMAL MG/DL
UROBILINOGEN UR STRIP-MCNC: NORMAL MG/DL
VENTRICULAR RATE- MUSE: 74 BPM
WBC # BLD AUTO: 13.3 10E3/UL (ref 4–11)
WBC # FLD AUTO: ABNORMAL /UL
WBC URINE: 2 /HPF

## 2025-08-01 PROCEDURE — 250N000013 HC RX MED GY IP 250 OP 250 PS 637: Performed by: INTERNAL MEDICINE

## 2025-08-01 PROCEDURE — 85610 PROTHROMBIN TIME: CPT | Performed by: HOSPITALIST

## 2025-08-01 PROCEDURE — 80321 ALCOHOLS BIOMARKERS 1OR 2: CPT | Performed by: INTERNAL MEDICINE

## 2025-08-01 PROCEDURE — 89050 BODY FLUID CELL COUNT: CPT | Performed by: EMERGENCY MEDICINE

## 2025-08-01 PROCEDURE — 84145 PROCALCITONIN (PCT): CPT | Performed by: HOSPITALIST

## 2025-08-01 PROCEDURE — 80048 BASIC METABOLIC PNL TOTAL CA: CPT | Performed by: HOSPITALIST

## 2025-08-01 PROCEDURE — 85014 HEMATOCRIT: CPT | Performed by: HOSPITALIST

## 2025-08-01 PROCEDURE — 258N000003 HC RX IP 258 OP 636: Performed by: HOSPITALIST

## 2025-08-01 PROCEDURE — P9047 ALBUMIN (HUMAN), 25%, 50ML: HCPCS | Performed by: EMERGENCY MEDICINE

## 2025-08-01 PROCEDURE — 120N000001 HC R&B MED SURG/OB

## 2025-08-01 PROCEDURE — 84300 ASSAY OF URINE SODIUM: CPT | Performed by: HOSPITALIST

## 2025-08-01 PROCEDURE — 83930 ASSAY OF BLOOD OSMOLALITY: CPT | Performed by: STUDENT IN AN ORGANIZED HEALTH CARE EDUCATION/TRAINING PROGRAM

## 2025-08-01 PROCEDURE — 36415 COLL VENOUS BLD VENIPUNCTURE: CPT | Performed by: HOSPITALIST

## 2025-08-01 PROCEDURE — 258N000003 HC RX IP 258 OP 636: Performed by: INTERNAL MEDICINE

## 2025-08-01 PROCEDURE — 82805 BLOOD GASES W/O2 SATURATION: CPT | Performed by: HOSPITALIST

## 2025-08-01 PROCEDURE — 82042 OTHER SOURCE ALBUMIN QUAN EA: CPT | Performed by: EMERGENCY MEDICINE

## 2025-08-01 PROCEDURE — 250N000013 HC RX MED GY IP 250 OP 250 PS 637: Performed by: PHYSICIAN ASSISTANT

## 2025-08-01 PROCEDURE — 99222 1ST HOSP IP/OBS MODERATE 55: CPT | Performed by: INTERNAL MEDICINE

## 2025-08-01 PROCEDURE — 83605 ASSAY OF LACTIC ACID: CPT | Performed by: HOSPITALIST

## 2025-08-01 PROCEDURE — 36415 COLL VENOUS BLD VENIPUNCTURE: CPT | Performed by: INTERNAL MEDICINE

## 2025-08-01 PROCEDURE — 250N000009 HC RX 250: Performed by: HOSPITALIST

## 2025-08-01 PROCEDURE — 250N000011 HC RX IP 250 OP 636: Performed by: PHYSICIAN ASSISTANT

## 2025-08-01 PROCEDURE — 250N000011 HC RX IP 250 OP 636: Performed by: HOSPITALIST

## 2025-08-01 PROCEDURE — 99233 SBSQ HOSP IP/OBS HIGH 50: CPT | Performed by: STUDENT IN AN ORGANIZED HEALTH CARE EDUCATION/TRAINING PROGRAM

## 2025-08-01 PROCEDURE — 87205 SMEAR GRAM STAIN: CPT | Performed by: EMERGENCY MEDICINE

## 2025-08-01 PROCEDURE — 84157 ASSAY OF PROTEIN OTHER: CPT | Performed by: EMERGENCY MEDICINE

## 2025-08-01 PROCEDURE — 250N000013 HC RX MED GY IP 250 OP 250 PS 637: Performed by: HOSPITALIST

## 2025-08-01 PROCEDURE — 250N000012 HC RX MED GY IP 250 OP 636 PS 637: Mod: JA | Performed by: INTERNAL MEDICINE

## 2025-08-01 PROCEDURE — 82310 ASSAY OF CALCIUM: CPT | Performed by: HOSPITALIST

## 2025-08-01 PROCEDURE — 83935 ASSAY OF URINE OSMOLALITY: CPT | Performed by: HOSPITALIST

## 2025-08-01 PROCEDURE — 87040 BLOOD CULTURE FOR BACTERIA: CPT | Performed by: HOSPITALIST

## 2025-08-01 PROCEDURE — P9047 ALBUMIN (HUMAN), 25%, 50ML: HCPCS | Performed by: INTERNAL MEDICINE

## 2025-08-01 PROCEDURE — 49083 ABD PARACENTESIS W/IMAGING: CPT

## 2025-08-01 PROCEDURE — 250N000011 HC RX IP 250 OP 636: Performed by: EMERGENCY MEDICINE

## 2025-08-01 PROCEDURE — 81003 URINALYSIS AUTO W/O SCOPE: CPT | Performed by: HOSPITALIST

## 2025-08-01 PROCEDURE — 84295 ASSAY OF SERUM SODIUM: CPT | Performed by: INTERNAL MEDICINE

## 2025-08-01 PROCEDURE — 80061 LIPID PANEL: CPT | Performed by: STUDENT IN AN ORGANIZED HEALTH CARE EDUCATION/TRAINING PROGRAM

## 2025-08-01 PROCEDURE — 87070 CULTURE OTHR SPECIMN AEROBIC: CPT | Performed by: EMERGENCY MEDICINE

## 2025-08-01 PROCEDURE — 258N000003 HC RX IP 258 OP 636: Performed by: PHYSICIAN ASSISTANT

## 2025-08-01 PROCEDURE — 250N000011 HC RX IP 250 OP 636: Performed by: INTERNAL MEDICINE

## 2025-08-01 RX ORDER — NALOXONE HYDROCHLORIDE 0.4 MG/ML
0.2 INJECTION, SOLUTION INTRAMUSCULAR; INTRAVENOUS; SUBCUTANEOUS
Status: DISCONTINUED | OUTPATIENT
Start: 2025-08-01 | End: 2025-08-09 | Stop reason: HOSPADM

## 2025-08-01 RX ORDER — MIDODRINE HYDROCHLORIDE 5 MG/1
5 TABLET ORAL
Status: DISCONTINUED | OUTPATIENT
Start: 2025-08-01 | End: 2025-08-02

## 2025-08-01 RX ORDER — CEFTRIAXONE 2 G/1
2 INJECTION, POWDER, FOR SOLUTION INTRAMUSCULAR; INTRAVENOUS EVERY 24 HOURS
Status: DISCONTINUED | OUTPATIENT
Start: 2025-08-01 | End: 2025-08-03

## 2025-08-01 RX ORDER — BUMETANIDE 0.25 MG/ML
4 INJECTION, SOLUTION INTRAMUSCULAR; INTRAVENOUS EVERY 8 HOURS
Status: DISCONTINUED | OUTPATIENT
Start: 2025-08-02 | End: 2025-08-02

## 2025-08-01 RX ORDER — NALOXONE HYDROCHLORIDE 0.4 MG/ML
0.4 INJECTION, SOLUTION INTRAMUSCULAR; INTRAVENOUS; SUBCUTANEOUS
Status: DISCONTINUED | OUTPATIENT
Start: 2025-08-01 | End: 2025-08-09 | Stop reason: HOSPADM

## 2025-08-01 RX ORDER — LACTULOSE 10 G/15ML
10 SOLUTION ORAL DAILY
Status: DISCONTINUED | OUTPATIENT
Start: 2025-08-01 | End: 2025-08-09 | Stop reason: HOSPADM

## 2025-08-01 RX ORDER — ALBUMIN (HUMAN) 12.5 G/50ML
25 SOLUTION INTRAVENOUS EVERY 8 HOURS
Status: DISCONTINUED | OUTPATIENT
Start: 2025-08-01 | End: 2025-08-03

## 2025-08-01 RX ORDER — GABAPENTIN 300 MG/1
300 CAPSULE ORAL
Status: DISCONTINUED | OUTPATIENT
Start: 2025-08-01 | End: 2025-08-09 | Stop reason: HOSPADM

## 2025-08-01 RX ORDER — BUMETANIDE 0.25 MG/ML
2 INJECTION, SOLUTION INTRAMUSCULAR; INTRAVENOUS EVERY 8 HOURS
Status: DISCONTINUED | OUTPATIENT
Start: 2025-08-01 | End: 2025-08-01

## 2025-08-01 RX ADMIN — SODIUM BICARBONATE: 84 INJECTION, SOLUTION INTRAVENOUS at 13:49

## 2025-08-01 RX ADMIN — OCTREOTIDE ACETATE 50 MCG/HR: 500 INJECTION, SOLUTION INTRAVENOUS; SUBCUTANEOUS at 13:49

## 2025-08-01 RX ADMIN — PANTOPRAZOLE SODIUM 40 MG: 40 INJECTION, POWDER, FOR SOLUTION INTRAVENOUS at 00:17

## 2025-08-01 RX ADMIN — Medication 1 TABLET: at 07:41

## 2025-08-01 RX ADMIN — BUMETANIDE 2 MG: 0.25 INJECTION INTRAMUSCULAR; INTRAVENOUS at 15:43

## 2025-08-01 RX ADMIN — PHYTONADIONE 10 MG: 10 INJECTION, EMULSION INTRAMUSCULAR; INTRAVENOUS; SUBCUTANEOUS at 15:48

## 2025-08-01 RX ADMIN — LACTULOSE SOLUTION USP, 10 G/15 ML 10 G: 10 SOLUTION ORAL; RECTAL at 09:51

## 2025-08-01 RX ADMIN — MIDODRINE HYDROCHLORIDE 5 MG: 5 TABLET ORAL at 17:04

## 2025-08-01 RX ADMIN — SODIUM CHLORIDE 500 ML: 0.9 INJECTION, SOLUTION INTRAVENOUS at 02:46

## 2025-08-01 RX ADMIN — RIFAXIMIN 550 MG: 550 TABLET ORAL at 20:15

## 2025-08-01 RX ADMIN — MIDODRINE HYDROCHLORIDE 5 MG: 5 TABLET ORAL at 13:49

## 2025-08-01 RX ADMIN — ALBUMIN HUMAN 25 G: 0.25 SOLUTION INTRAVENOUS at 15:32

## 2025-08-01 RX ADMIN — CEFTRIAXONE SODIUM 2 G: 2 INJECTION, POWDER, FOR SOLUTION INTRAMUSCULAR; INTRAVENOUS at 00:21

## 2025-08-01 RX ADMIN — SODIUM CHLORIDE 500 ML: 0.9 INJECTION, SOLUTION INTRAVENOUS at 00:30

## 2025-08-01 RX ADMIN — THIAMINE HCL TAB 100 MG 100 MG: 100 TAB at 07:41

## 2025-08-01 RX ADMIN — RIFAXIMIN 550 MG: 550 TABLET ORAL at 15:49

## 2025-08-01 RX ADMIN — ALBUMIN HUMAN 25 G: 0.25 SOLUTION INTRAVENOUS at 00:36

## 2025-08-01 RX ADMIN — ALBUMIN HUMAN 25 G: 0.25 SOLUTION INTRAVENOUS at 22:27

## 2025-08-01 RX ADMIN — SODIUM BICARBONATE: 84 INJECTION, SOLUTION INTRAVENOUS at 01:08

## 2025-08-01 RX ADMIN — BUMETANIDE 4 MG: 0.25 INJECTION INTRAMUSCULAR; INTRAVENOUS at 23:55

## 2025-08-01 RX ADMIN — SODIUM CHLORIDE: 0.9 INJECTION, SOLUTION INTRAVENOUS at 00:17

## 2025-08-01 ASSESSMENT — ACTIVITIES OF DAILY LIVING (ADL)
ADLS_ACUITY_SCORE: 41
ADLS_ACUITY_SCORE: 41
ADLS_ACUITY_SCORE: 57
ADLS_ACUITY_SCORE: 41
ADLS_ACUITY_SCORE: 37
ADLS_ACUITY_SCORE: 41
ADLS_ACUITY_SCORE: 54
DEPENDENT_IADLS:: INDEPENDENT
ADLS_ACUITY_SCORE: 54
ADLS_ACUITY_SCORE: 37
ADLS_ACUITY_SCORE: 41
ADLS_ACUITY_SCORE: 57
ADLS_ACUITY_SCORE: 57
ADLS_ACUITY_SCORE: 54
ADLS_ACUITY_SCORE: 41
ADLS_ACUITY_SCORE: 37

## 2025-08-01 NOTE — ED TRIAGE NOTES
Pt states increasing shortness of breath and progressive abdominal distension.  Pt has history of needing paracentesis with last reported tap near the beginning of the month.  Pt also appears markedly jaundiced.  Pt reports abdominal pain 3/10.

## 2025-08-01 NOTE — CONSULTS
Care Management Initial Consult    General Information  Assessment completed with: Patient,    Type of CM/SW Visit: Initial Assessment    Primary Care Provider verified and updated as needed:     Readmission within the last 30 days: current reason for admission unrelated to previous admission   Return Category: Progression of disease  Reason for Consult: discharge planning  Advance Care Planning: Advance Care Planning Reviewed: no concerns identified          Communication Assessment  Patient's communication style: spoken language (English or Bilingual)    Hearing Difficulty or Deaf: yes   Wear Glasses or Blind: yes    Cognitive  Cognitive/Neuro/Behavioral: WDL                      Living Environment:   People in home: spouse     Current living Arrangements: house      Able to return to prior arrangements: yes       Family/Social Support:  Care provided by: self  Provides care for: no one  Marital Status:   Support system: Wife, Children          Description of Support System: Supportive         Current Resources:   Patient receiving home care services: No        Community Resources: None  Equipment currently used at home: none  Supplies currently used at home: None    Employment/Financial:  Employment Status: retired        Financial Concerns:             Does the patient's insurance plan have a 3 day qualifying hospital stay waiver?  No    Lifestyle & Psychosocial Needs:  Social Drivers of Health     Food Insecurity: Low Risk  (8/1/2025)    Food Insecurity     Within the past 12 months, did you worry that your food would run out before you got money to buy more?: No     Within the past 12 months, did the food you bought just not last and you didn t have money to get more?: No   Depression: Not on file   Housing Stability: Low Risk  (8/1/2025)    Housing Stability     Do you have housing? : Yes     Are you worried about losing your housing?: No   Tobacco Use: Unknown (7/16/2025)    Patient History      Smoking Tobacco Use: Never     Smokeless Tobacco Use: Unknown     Passive Exposure: Not on file   Financial Resource Strain: Low Risk  (8/1/2025)    Financial Resource Strain     Within the past 12 months, have you or your family members you live with been unable to get utilities (heat, electricity) when it was really needed?: No   Alcohol Use: Not on file   Transportation Needs: Low Risk  (8/1/2025)    Transportation Needs     Within the past 12 months, has lack of transportation kept you from medical appointments, getting your medicines, non-medical meetings or appointments, work, or from getting things that you need?: No   Physical Activity: Not on file   Interpersonal Safety: Low Risk  (8/1/2025)    Interpersonal Safety     Do you feel physically and emotionally safe where you currently live?: Yes     Within the past 12 months, have you been hit, slapped, kicked or otherwise physically hurt by someone?: No     Within the past 12 months, have you been humiliated or emotionally abused in other ways by your partner or ex-partner?: No   Stress: Not on file   Social Connections: Socially Integrated (7/15/2025)    Received from [facility]    Social Connections     Do you often feel lonely or isolated from those around you?: 0   Health Literacy: Not on file       Functional Status:  Prior to admission patient needed assistance:   Dependent ADLs:: Independent  Dependent IADLs:: Independent       Mental Health Status:  Mental Health Status: No Current Concerns       Chemical Dependency Status:  Chemical Dependency Status: Past Concern  Chemical Dependency Management: Other (see comment) (reports recently stopped drinking on his own)          Values/Beliefs:  Spiritual, Cultural Beliefs, Congregation Practices, Values that affect care:                 Discussed  Partnership in Safe Discharge Planning  document with patient/family: No    Additional Information:  Initial CM consult received;  CM reviewed chart, discussed with medical team, met with patient in his room.  Reviewed role of care management and current hospitalization.  Patient states all of his care while in the hospital has been great, he is very pleased with all staff and providers.      Briefly discussed his history of drinking.  Patient confirms he has not drank since July 1 and that it has been very easy for him to stop drinking.  He comments that it was much easier than he thought, that he thought it would be harder; he is grateful that it has not been a struggle for him.      Patient confirms he is independent at home with all daily needs.  Reports that family will provide ride home upon discharge; he does not think he will have need for additional support at home upon hospital discharge.     Next Steps:   CM will follow for possible needs for discharge support.  Currently being followed by gastroenterology and nephrology.     MARK PattonW

## 2025-08-01 NOTE — H&P
[facility]    History and Physical - Hospitalist Service       Date of Admission:  7/31/2025    Assessment & Plan      Malcom Chowdhury is a 75 year old male admitted on 7/31/2025.    Principal Problem:    End stage liver disease (H)  Active Problems:    Alcoholic cirrhosis of liver with ascites (H)    Hyperbilirubinemia    Hyponatremia    Ascites due to alcoholic cirrhosis (H)    BRADLEY (acute kidney injury)    Chronic diastolic heart failure (H)    Portal hypertension (H)    Secondary esophageal varices without bleeding (H)    Metabolic acidemia      75 year old M who has history of alcoholism, mood disorder, recently hospitalized 7/2-7/6, 7/16-7/17 found to have new diagnosis of bleeding esophageal varices and alcoholic cirrhosis, - Is on lasix /aldactone now with increasing shortness of breath and progressive abdominal distension. Pt has history of needing paracentesis with last reported tap 7/16-Paracentesis was without evidence of SBP. Pt also is markedly jaundiced. Pt reports abdominal pain 3/10.  He reports that this has been progressively worsening and feels similar to what he experienced prior to previous paracentesis.  He reports that he has an EGD scheduled for 8/8 but he does not have any repeat paracentesis scheduled.  He has not had much abdominal pain but has had some diarrhea.  He denies chest pain, fever, nausea, vomiting, or urinary symptoms.  He reports that he has been taking all of his medications as prescribed.     Vitals on arrival stable.  On initial exam, patient had a fairly significant abdominal distention but no focal abdominal tenderness palpation to suggest underlying SBP. CMP notable for hyponatremia with sodium 114, downtrending from previous. Creatinine also trending up at 3.89. Alk phos remains elevated at 166. AST and ALT elevated 135 and 84, respectively. Bilirubin also trending up at 26.5. Lactate elevated but could be in the setting of liver disease- r/o  sepsis and SBP-WBC of 15.3, increasing from previous. With this will proceed with CT A/P. HGB 12.1, baseline. PLTs wnl.   ED-spoke with Dr. Desai with [facility] about the patient.  He is currently stable otherwise does not meet any SIRS criteria, clinically appears dehydrated.  Will give gentle fluids and blood cultures obtained and will trend lactate after fluids and trend sodium and will admit for further evaluation treatment and consultation with GI and nephro.  Also with metabolic acidemia with pH of 7.28 and low bicarb.  Due to sodium being low to avoid rapid rise will give dextrose with bicarb infusion after 500 ml NS bolus and iv albumin.  Clinically hypovolemic intravascularly despite extravascular anasarca from hypoalbuminemia and liver cirrhosis.      Hyponatremia -114, metabolic acidemia due to BRADLEY    BRADLEY (acute kidney injury) ?Hepatorenal  recently started on Lasix and spironolactone to control ascites- hold with BRADLEY  BRADLEY lb Prerenal- check FENA, CT Imaging   R/o ATN-SBP r/o- orders in to be US guided paracentesis with albumin.  - Monitor trend  - Avoid nephrotoxic meds  -nephro consult  -IV fluids with bicarb- sodium q4 hrs  - Lactic acid is 2.4 due to liver diaseas- assess for sepsis, R/O SBP-Administration of 30 mL/kg of crystalloid fluids would be detrimental or harmful for this patient. Plan to administer 500 mL now and reassess, bolus fluids as tolerated has been provided and no further bolus IV fluids due to risk of progression of CHF, and continue to monitor with IV fluids further  - Obtain Microbiological studies-  Blood cultures and lactates has been obtained  - IV antibiotics- empirically rocephin to cover broad-spectrum coverage for gram-positive negatives and anaerobes while  culture and sensitivities are pending  -Signs of end organ damage suggest from- elevated lactic acidosis >2 and impaired renal functions due to sepsis/Dehydration or liver disease         # Ascites due to alcoholic  cirrhosis (H)    Alcoholic cirrhosis of liver with ascites (H)    Hyperbilirubinemia   Presented with severe jaundice, total bili up to 26  -MELD-Na -up to 30 now, INR 1.91 BRADLEY ?Hepatorenal, Sodium down to 114- no AMS, ammonia WNL, LA>2- ?Sepsis. Prognosis is worrisome  -Per GI previosly steroid contraindicated given WBC elevation, R/O SBP WITH ALBUMIN.    Request consultation to GI,nephrology-appreciated assistance and recommendations.      #Portal hypertension  # Large volume ascites due to above  -recently started on Lasix and spironolactone to control ascites- hold with BRADLEY  (-last diagnostic paracentesis done 7/16 with 3L removed  -fluid studies not c/w SBP)     # Esophageal varices, recent banding 7/3  -no signs of ongoing bleeding  -Hgb stable  than at recent discharge  -scheduled for followup EGD 8/8     # Alcoholism  -last EtOH use ~7/1 denies any active drinking  -states sobriety going well. He had one chem dep counseling session but did not like his counselor.    #RLS  - started on gabapentin during last hospital stay with much benefit  PRN dosing     # Peripheral edema likely due to anasarca and liver cirrhosis and hyperammonemia  - continue his home compression stockings       Chronic stable issues:     # Recent diagnosis of chronic heart failure  -recent Echocardiogram noting a mildly reduced LVEF 50 to 55%, with no prior echocardiogram available for review  -Suspect possibly myocarditis due to alcoholism.   #Heart murmur  #Mild-moderate aortic stenosis  -followup echo as above     #9mm liver nodule  -MRI showed 9 mm hyperenhancing nodule in segment 4A of the liver  - Radiologist recommends follow-up MRI in 3 to 6 months      Initial orders were placed.  Workup and follow-up labs has been placed.   Current problem list also has been updated.    Anticipated length of stay of more than 2 midnights of hospitalization depending on progression, planning,findings,further testing or treatment needs. Services  "are medically necessary for evaluation and treatment of the acute & on chronic superimposed conditions if applicable with the treatment plan as outlined above.    More than 50% of time spent in Counselling & coordination of care.  Disposition:   Pending on further clinical progression, further evaluation findings and recommendations.          Diet: NPO for Procedure/Surgery per Anesthesia Guidelines Except for: Meds; Clear liquids before procedure/surgery: ADULT (Age GREATER than or Equal to 18 years) - Clear liquids 2 hours before procedure/surgery    DVT Prophylaxis: Pneumatic Compression Devices and Anti-embolisim stockings (TEDs)  Erazo Catheter: Not present  Lines: None     Cardiac Monitoring: ACTIVE order. Indication: QTc prolonging medication (48 hours)  Code Status: Full Code    Clinically Significant Risk Factors Present on Admission         # Hyponatremia: Lowest Na = 114 mmol/L in last 2 days, will monitor as appropriate  # Hypochloremia: Lowest Cl = 86 mmol/L in last 2 days, will monitor as appropriate      # Hypoalbuminemia: Lowest albumin = 2.5 g/dL at 7/31/2025 10:15 PM, will monitor as appropriate    # Coagulation Defect: INR = 1.91 (Ref range: 0.85 - 1.15) and/or PTT = 42 Seconds (Ref range: 22 - 38 Seconds), will monitor for bleeding   # Acute Kidney Injury, unspecified: based on a >150% or 0.3 mg/dL increase in last creatinine compared to past 90 day average, will monitor renal function            # Obesity: Estimated body mass index is 37.49 kg/m  as calculated from the following:    Height as of 7/2/25: 1.753 m (5' 9.02\").    Weight as of 7/16/25: 115.2 kg (254 lb).         # Financial/Environmental Concerns:           Disposition Plan     Medically Ready for Discharge: Anticipated in 2-4 Days           Thomas Tejada MD  Hospitalist Service  [facility]  Securely message with [employer] (more info)  Text page via [employer] Paging/Directory "     ______________________________________________________________________    Chief Complaint   ascites    History is obtained from the patient, electronic health record, and emergency department physician    History of Present Illness   Malcom Chowdhury is a 75 year old male who presents with shortness of breath.     Per patient, recently he has had progressively worsening shortness of breath. Also, his abdomen has felt bloated. Then last night (07/30/2025) he had 4-6 hours of diarrhea. When he awoke today (07/31/2025) he had sharp abdominal pain. He did note that 5 years ago he had fluid drained from his abdomen and he said it feels like he may have to have fluid drained again. He does have an endoscopy scheduled. It was noted that he has not been eating a lot lately.     He has had no fever, cough, or urinary symptoms. Here in the ED his abdominal pain from this morning is gone. His legs are not more swollen than they typically are. Currently he has no scheduled paracentesis. He is not on lactulose. He did not mention taking anything before coming in for his symptoms. No daily medications were mentioned.     Chart review:  Per Chart Review, the patient was seen from 07/16/2025-07/17/2025 at [facility] for evaluation of jaundice and elevated bilirubin. His bilirubin in the ED was 26.9. Paracentesis was without evidence of SBP and he was cleared for discharge by GI with plans for repeat labs one week later.     Past Medical History    Past Medical History:   Diagnosis Date    Alcoholic cirrhosis of liver with ascites (H)     Esophageal varices (H)     Peripheral edema     Restless legs syndrome (RLS)        Past Surgical History   No past surgical history on file.    Prior to Admission Medications   Prior to Admission Medications   Prescriptions Last Dose Informant Patient Reported? Taking?   furosemide (LASIX) 20 MG tablet 7/31/2025 Morning  No Yes   Sig: Take 1 tablet (20 mg) by mouth daily.   gabapentin  (NEURONTIN) 300 MG capsule   Yes Yes   Sig: Take 1 capsule (300 mg) by mouth 3 times daily as needed (restless legs).   multivitamin w/minerals (THERA-VIT-M) tablet 7/31/2025 Morning  No Yes   Sig: Take 1 tablet by mouth daily.   psyllium (METAMUCIL/KONSYL) capsule 7/31/2025 Morning  No Yes   Sig: Take 1 capsule by mouth daily.   spironolactone (ALDACTONE) 25 MG tablet 7/31/2025 Morning  No Yes   Sig: Take 1 tablet (25 mg) by mouth daily.   thiamine (B-1) 100 MG tablet 7/31/2025 Morning  No Yes   Sig: Take 1 tablet (100 mg) by mouth daily.      Facility-Administered Medications: None        Review of Systems    The 5 point Review of Systems is negative other than noted in the HPI or here.     Social History   I have reviewed this patient's social history and updated it with pertinent information if needed.  Social History     Tobacco Use    Smoking status: Never   Substance Use Topics    Alcohol use: Not Currently    Drug use: Never         Allergies   Allergies   Allergen Reactions    Penicillins Unknown    Sulfa Antibiotics Unknown        Physical Exam   Vital Signs: Temp: 97.9  F (36.6  C) Temp src: Oral BP: 115/58 Pulse: 77   Resp: 23 SpO2: 94 % O2 Device: None (Room air)    Weight: 0 lbs 0 oz    Awake/easily responsive , oriented   Vision Baseline  Neck supple  Oral mucosa dry  bilateral air entry heard,  S1-S2 normal  Abdomen is soft -ascitic without tenderness  Extremities -2+ visible swelling noted  No skin cyanosis  Neurologically- no new Gross deficits from baseline-Moving all 4 extremities  Psych-mood okay and appropriate to circumstances      Medical Decision Making       75 MINUTES SPENT BY ME on the date of service doing chart review, history, exam, documentation & further activities per the note.      Data     I have personally reviewed the following data over the past 24 hrs:    15.3 (H)  \   12.1 (L)   / 277     114 (LL) 86 (L) 76.1 (H) /  190 (H)   5.3 14 (L) 3.89 (H) \     ALT: 84 (H) AST: 135 (H)  AP: 166 (H) TBILI: 26.5 (HH)   ALB: 2.5 (L) TOT PROTEIN: 6.2 (L) LIPASE: 212 (H)     Procal: N/A CRP: N/A Lactic Acid: 2.8 (H)       INR:  1.91 (H) PTT:  N/A   D-dimer:  N/A Fibrinogen:  N/A       Imaging results reviewed over the past 24 hrs:   Recent Results (from the past 24 hours)   CT Abdomen Pelvis w/o Contrast    Narrative    EXAM: CT ABDOMEN PELVIS W/O CONTRAST  LOCATION: [facility]  DATE: 7/31/2025    INDICATION: Abdominal distention.  COMPARISON: 7/16/2025.  TECHNIQUE: CT scan of the abdomen and pelvis was performed without IV contrast. Multiplanar reformats were obtained. Dose reduction techniques were used.  CONTRAST: None.    FINDINGS:   LOWER CHEST: Mild scattered atelectasis in the lung bases. No consolidation or significant pleural effusion.    HEPATOBILIARY: Cirrhotic morphology of the liver redemonstrated. A 1 m cyst again seen in the left hepatic lobe. Large volume ascites is increased from prior. Gallbladder is normal on this noncontrast enhanced study.    PANCREAS: Normal.    SPLEEN: Mildly enlarged measuring 14.7 cm maximally. Capsular calcification along the lateral splenic surface is unchanged.    ADRENAL GLANDS: Normal.    KIDNEYS/BLADDER: Normal.    BOWEL: No inflammatory bowel wall thickening or bowel obstruction. Appendix is normal. No free air.    LYMPH NODES: Normal.    VASCULATURE: Mild aortoiliac atherosclerotic calcifications. No abdominal aortic aneurysm. Multiple intra-abdominal venous collateral vessels present.    PELVIC ORGANS: Normal.    MUSCULOSKELETAL: Mild spinal degenerative changes. No suspicious osseous lesions or acute fractures.      Impression    IMPRESSION:   1.  Redemonstration of cirrhosis with large volume ascites.    2.  Findings of portal hypertension including mild splenomegaly and intra-abdominal venous collaterals.

## 2025-08-01 NOTE — ED PROVIDER NOTES
EMERGENCY DEPARTMENT ENCOUNTER      NAME: Malcom Chowdhury  AGE: 75 year old male  YOB: 1950  EVALUATION DATE & TIME: 7/31/2025  9:50 PM    ED PROVIDER: Alexandra Roblero MD    Chief Complaint   Patient presents with    Shortness of Breath    Abdominal Pain       FINAL IMPRESSION  No diagnosis found.    MEDICAL DECISION MAKING   Malcom Chowdhury is a 75 year old male who presents via *** with *** for evaluation of ***.    Patient has a long and well-documented history of alcohol use, esophageal varices with bleeding, cirrhosis and ascites, CHF.  He has been hospitalized twice so far this month with symptoms related to cirrhosis.  He was most recently hospitalized 7/16/2025 when he presented to the ED with jaundice and elevated bilirubin.  He had a CT scan that was nonspecific and underwent diagnostic and therapeutic paracentesis.  GI recommended removal of 2 to 3 L but no more.  Febrile with temperature on admission, he did see GI..  Infection.  Multiple show today, he presents again with complaints of abdominal distention and dyspnea.  He reports that this has been progressively worsening and feels similar to what he experienced prior to previous paracentesis.  He reports that he has an EGD scheduled for 8/8 but he does not have any repeat paracentesis scheduled.  He has not had much abdominal pain but has had some diarrhea.  He denies chest pain, fever, nausea, vomiting, or urinary symptoms.  He reports that he has been taking all of his medications as prescribed.    Vitals on arrival stable.  On initial exam, patient had a fairly significant abdominal distention but no focal abdominal tenderness palpation to suggest underlying SBP.  I considered a very broad differential given not limited to progression of known liver disease, obstruction, perforation, SBP, coagulopathy, colitis, diverticulitis, pancreatitis, GERD, peptic ulcer disease, atypical ACS, arrhythmia, CHF, sepsis/bacteremia, ischemia, other.   Discussed options for workup and management with the patient and his significant other.  We have agreed on plan for labs, EKG, paracentesis for both diagnostic and therapeutic purposes, CT abdomen/pelvis.  Anticipate patient will require admission and he states that he was planning for this as well.    ED Course as of 07/31/25 2337   Thu Jul 31, 2025   2244 CBC with Platelets and Differential (Limited Occurrences)(!)  CBC notable for leukocytosis with WBC of 15.3, increasing from previous. With this will proceed with CT A/P. HGB 12.1, baseline. PLTs wnl.    2254 Lipase(!): 212  Elevated, unclear if related to acute pancreatitis or chronic.  Patient not having any focal upper/epigastric pain to suggest acute pancreatitis.  Will continue with plan for CT scan   2307 Comprehensive Metabolic Panel (Limited Occurrences)(!!)  CMP notable for hyponatremia with sodium 114, downtrending from previous.  Creatinine also trending up at 3.89.  Alk phos remains elevated at 166.  AST and ALT elevated 135 and 84, respectively.  Bilirubin also trending up at 26.5.   2307 INR(!): 1.91  Increased as expected with liver disease   2330 Lactic Acid(!): 2.8  Lactate elevated but I suspect this is related to demand and poor clearance in the setting of liver disease.   2330 Ammonia: 25  Within normal limits, reassuring         At the end of the encounter, we reviewed the results, potential diagnoses, as well as return precautions and recommendations for follow up. I instructed Mr. Chowdhury to return to the emergency department immediately if he develops any new or worsening symptoms and provided additional verbal discharge instructions. Mr. Chowdhury expressed understanding and agreement with this plan of care, his questions were answered, and he was discharged in stable condition.      Additional Considerations in MDM  History:  Supplemental history from: the patient and the patient's family member  External Record(s) reviewed:  "07/16/2025-07/17/2025 visit at [facility]    Work Up:  Chart documentation includes differential diagnoses considered and any EKGs or imaging independently interpreted as specified above.   In additional to work up documented, I considered additional advanced imaging and laboratory workup but deferred after shared decision making conversation with patient/family    External Consultation(s):  Discussion of management with another provider as documented above and in ED course     Chronic Illness(es):  Care impacted by chronic illness(es): Alcohol and/or Drug Abuse or Dependence and Heart Disease    Disposition considerations: {ADMIT VS D/C:700087}    MIPS: {ECC MIPS DOCUMENTATION:175058}       Sepsis/STEMI/Stroke: {Sepsis/Stemi/Stroke:218130::\"None\"}        ED COURSE  9:53 PM I met with the patient, obtained history, performed an initial exam, and discussed options and plan for diagnostics and treatment here in the ED.   11:51 PM I spoke with Dr. Desai with [facility] about the patient.  11:54 PM I spoke with Dr. Tejada, the hospitalist, about the patient.    MEDICATIONS GIVEN IN THE ED  Medications - No data to display    NEW PRESCRIPTIONS STARTED AT TODAY'S VISIT  New Prescriptions    No medications on file          =================================================================    HPI:    Use of : N/A     Malcom Chowdhury is a 75 year old male who presents with shortness of breath.    Per patient, recently he has had progressively worsening shortness of breath. Also, his abdomen has felt bloated. Then last night (07/30/2025) he had 4-6 hours of diarrhea. When he awoke today (07/31/2025) he had sharp abdominal pain. He did note that 5 years ago he had fluid drained from his abdomen and he said it feels like he may have to have fluid drained again. He does have an endoscopy scheduled. It was noted that he has not been eating a lot lately.    He has had no fever, cough, or urinary symptoms. Here in the " ED his abdominal pain from this morning is gone. His legs are not more swollen than they typically are. Currently he has no scheduled paracentesis. He is not on lactulose. He did not mention taking anything before coming in for his symptoms. No daily medications were mentioned.    Chart review:  Per Chart Review, the patient was seen from 07/16/2025-07/17/2025 at [facility] for evaluation of jaundice and elevated bilirubin. His bilirubin in the ED was 26.9. Paracentesis was without evidence of SBP and he was cleared for discharge by GI with plans for repeat labs one week later.       RELEVANT HISTORY, MEDICATIONS, & ALLERGIES   Past medical history, surgical history, family history, medications, and allergies reviewed and pertinent noted in HPI.    REVIEW OF SYSTEMS:  A complete review of systems was performed with pertinent positives and negatives noted in the HPI.     PHYSICAL EXAM:    Vitals: /54   Pulse 81   Temp 97.9  F (36.6  C) (Oral)   Resp 14   SpO2 94%    General: Alert and interactive, comfortable appearing.  HENT: Atraumatic. Full AROM of neck. MMM. Scleral icterus.   Cardiovascular: Regular rate and rhythm.   Chest/Pulmonary: Normal work of breathing. Speaking in complete sentences. Lungs CTAB. No chest wall tenderness or deformities.  Abdomen: Distended. Nontender without guarding or rebound.  Extremities: Normal AROM of all major joints.  Skin: Significant jaundice throughout.   Neuro: Speech clear. CNs grossly intact. Moves all extremities spontaneously.   Psych: Normal affect/mood, cooperative, memory appropriate.      LAB  Labs Ordered and Resulted from Time of ED Arrival to Time of ED Departure - No data to display    RADIOLOGY  US Paracentesis without Albumin    (Results Pending)       EKG  Performed at: 10:09 PM on 07/31/2025  Impression: Sinus rhythm with 1st degree A-V block, right axis deviation, possible anterior infarct, age undetermined, abnormal ECG  Rate: 74  BPM  Rhythm: Sinus  QRS Interval: 96 ms  QTc Interval: 479 ms  Comparison: No previous ECG availiable    All laboratory and imaging results and EKG's were personally reviewed and interpreted by myself prior to disposition decision.       PROCEDURES  ***      I, Dillon Liz, am serving as a scribe to document services personally performed by Dr. Alexandra Roblero based on my observation and the provider's statements to me. I, Alexandra Roblero MD attest that Dillon Liz is acting in a scribe capacity, has observed my performance of the services and has documented them in accordance with my direction.    Alexandra Roblero M.D.  Emergency Medicine  [facility] EMERGENCY DEPARTMENT  [facility] [facility] [id number] [phone]  Dept: [phone]    Nitrogen 76.1 (*)     Creatinine 3.89 (*)     GFR Estimate 16 (*)     Calcium 8.4 (*)     Chloride 86 (*)     Glucose 190 (*)     Alkaline Phosphatase 166 (*)      (*)     ALT 84 (*)     Protein Total 6.2 (*)     Albumin 2.5 (*)     Bilirubin Total 26.5 (*)    LIPASE - Abnormal    Lipase 212 (*)    INR - Abnormal    INR 1.91 (*)     PT 22.0 (*)    CBC WITH PLATELETS AND DIFFERENTIAL - Abnormal    WBC Count 15.3 (*)     RBC Count 3.58 (*)     Hemoglobin 12.1 (*)     Hematocrit 33.9 (*)     MCV 95      MCH 33.8 (*)     MCHC 35.7      RDW 14.5      Platelet Count 277      % Neutrophils 92      % Lymphocytes 2      % Monocytes 4      % Eosinophils 0      % Basophils 0      % Immature Granulocytes 1      NRBCs per 100 WBC 0      Absolute Neutrophils 14.1 (*)     Absolute Lymphocytes 0.3 (*)     Absolute Monocytes 0.7      Absolute Eosinophils 0.0      Absolute Basophils 0.0      Absolute Immature Granulocytes 0.2      Absolute NRBCs 0.0     LACTIC ACID WHOLE BLOOD WITH 1X REPEAT IN 2 HR WHEN >2 - Abnormal    Lactic Acid, Initial 2.8 (*)    BLOOD GAS VENOUS - Abnormal    pH Venous 7.28 (*)     pCO2 Venous 37 (*)     pO2 Venous 23 (*)     Bicarbonate Venous 17 (*)     Base Excess/Deficit Venous -9.1 (*)     FIO2 21      Oxyhemoglobin Venous 27 (*)     O2 Sat, Venous 27.2 (*)    SODIUM - Abnormal    Sodium 115 (*)    LACTIC ACID WHOLE BLOOD - Abnormal    Lactic Acid 3.4 (*)    AMMONIA - Normal    Ammonia 25     PROCALCITONIN    Procalcitonin       ALBUMIN FLUID   PROTEIN FLUID   ROUTINE UA WITH MICROSCOPIC REFLEX TO CULTURE   OSMOLALITY, RANDOM URINE   COMPREHENSIVE METABOLIC PANEL (LIMITED OCCURRENCES)   CBC WITH PLATELETS   INR   LACTIC ACID WHOLE BLOOD   SODIUM   AEROBIC BACTERIAL CULTURE ROUTINE   BLOOD CULTURE   BLOOD CULTURE   CELL COUNT WITH DIFFERENTIAL FLUID   FRACTIONAL EXCRETION OF SODIUM       RADIOLOGY  CT Abdomen Pelvis w/o Contrast   Final Result   IMPRESSION:    1.  Redemonstration of cirrhosis with  large volume ascites.      2.  Findings of portal hypertension including mild splenomegaly and intra-abdominal venous collaterals.         US Paracentesis with Albumin    (Results Pending)       EKG  Performed at: 10:09 PM on 07/31/2025  Impression: Sinus rhythm with 1st degree A-V block, right axis deviation.  No acute ischemic changes.  Rate: 74 BPM  Rhythm: Sinus  QRS Interval: 96 ms  QTc Interval: 479 ms  Comparison: No previous ECG availiable    All laboratory and imaging results and EKG's were personally reviewed and interpreted by myself prior to disposition decision.         I, Dillon Liz, am serving as a scribe to document services personally performed by Dr. Alexandra Roblero based on my observation and the provider's statements to me. I, Alexandra Roblero MD attest that Dillon Liz is acting in a scribe capacity, has observed my performance of the services and has documented them in accordance with my direction.    Alexandra Roblero M.D.  Emergency Medicine  [facility] EMERGENCY DEPARTMENT  [facility] [facility] [id number] [phone]  Dept: [phone]      Alexandra Roblero MD  08/01/25 0412

## 2025-08-01 NOTE — PLAN OF CARE
Goal Outcome Evaluation:      Plan of Care Reviewed With: patient    Overall Patient Progress: improving    SUBJECTIVE:  A/o on RA and SBA. Jaundice skin and remains on IV fluids. Several critical labs today; see chart; MD aware. Paracentesis done today.     Tk Chappell RN

## 2025-08-01 NOTE — CONSULTS
GI CONSULT NOTE      Name: Malcom Chowdhury    Medical Record #: [id number]    YOB: 1950    Date: 8/1/2025    CC: We were consulted by Thomas Tejada MD to see Malcom Chowdhury in regards to ascites.    HPI: This is a 75 year old male with a history of alcohol use disorder, recently diagnosed cirrhosis, esophageal varices, ascites, recently diagnosed CHF, aortic stenosis, RLS.  Recently hospitalized in early July and again July 16, 2025 with jaundice and ascites.  Had EGD 7/3/25 showing esophageal varices.  Follow-up EGD scheduled August 8.  First paracentesis was July 3, 2025 with 2.1 L fluid removed.  Second paracentesis was 7/16/2025 with 3 L of fluid removed.  Negative for SBP.  Order was entered yesterday by our office for another paracentesis.  He was felt to have alcoholic hepatitis; however, due to his elevated WBC, Sterets not started.  Presented to the ED 7/31/25 with abdominal distention and shortness of breath.  CT showing large volume paracentesis.  Found to be hyponatremic with sodium 114, creatinine elevated to 3.77, T. bili 28, lactic acid 3.4, WBC 13.  Has plans for paracentesis and eval by nephrology today.  Last alcohol use was July 1, 2025.  Has contacted counselor for alcohol misuse, though has not had appointment yet.  No herbal supplements.  Reports he compliant with low-sodium diet at home.  Poor appetite for the past couple months.  Reports his sleep/weight cycle has been trending towards more nocturnal, though denies any confusion and reports his wife has not noted any confusion.    Past medical history  Past Medical History:   Diagnosis Date    Alcoholic cirrhosis of liver with ascites (H)     Esophageal varices (H)     Peripheral edema     Restless legs syndrome (RLS)       Family history  No family history on file.     Social history  Social History     Tobacco Use    Smoking status: Never   Substance Use Topics    Alcohol use: Not Currently    Drug use: Never      Medications:   PTA Med List   Medication Sig Last Dose/Taking    furosemide (LASIX) 20 MG tablet Take 1 tablet (20 mg) by mouth daily. 7/31/2025 Morning    gabapentin (NEURONTIN) 300 MG capsule Take 1 capsule (300 mg) by mouth 3 times daily as needed (restless legs). Taking As Needed    multivitamin w/minerals (THERA-VIT-M) tablet Take 1 tablet by mouth daily. 7/31/2025 Morning    psyllium (METAMUCIL/KONSYL) capsule Take 1 capsule by mouth daily. 7/31/2025 Morning    spironolactone (ALDACTONE) 25 MG tablet Take 1 tablet (25 mg) by mouth daily. 7/31/2025 Morning    thiamine (B-1) 100 MG tablet Take 1 tablet (100 mg) by mouth daily. 7/31/2025 Morning      Allergies:    Allergies   Allergen Reactions    Penicillins Unknown    Sulfa Antibiotics Unknown      REVIEW OF SYSTEMS (ROS): Review of systems is as per HPI.  Remainder of complete review of systems is negative.      PHYSICAL EXAMINATION:      /58 (BP Location: Left arm)   Pulse 70   Temp 97.5  F (36.4  C) (Oral)   Resp 18   Wt 101.6 kg (223 lb 14.4 oz)   SpO2 97%   BMI 33.05 kg/m    GEN: Well developed, well nourished 75 year old in no acute distress.  HEENT: sclera icteric, moist mucous membranes   PULM: breathing comfortably on room air.  CARDIO: Regular rate and rhythm.  Systolic murmur  GI: Grossly distended.  Bowel sounds positive. Firm. Non-tender to palpation.  No guarding.  EXT: warm, bilat lower extremity pitting edema up to knees  NEURO: Alert and oriented.  Speech fluid. No asterixis.    PSYCH: Mental status appropriate, mood and affect normal.      LABS and IMAGING  Recent Labs   Lab 08/01/25  0656 07/31/25 2215   WBC 13.3* 15.3*   RBC 3.44* 3.58*   HGB 11.6* 12.1*   HCT 32.7* 33.9*   MCV 95 95   MCH 33.7* 33.8*   MCHC 35.5 35.7   RDW 14.5 14.5    277      Recent Labs   Lab 08/01/25  0656 08/01/25  0152 07/31/25 2215   * 115* 114*   CO2 15*  --  14*   BUN 76.8*  --  76.1*     Recent Labs   Lab 08/01/25  0610  07/31/25  2215   ALKPHOS 155* 166*   * 135*   ALT 83* 84*     Lab Results   Component Value Date    INR 1.92 (H) 08/01/2025    INR 1.91 (H) 07/31/2025    INR 1.75 (H) 07/17/2025     MR Liver 7/3/25  IMPRESSION:  1.  Cirrhosis.  2.  0.9 cm hyperenhancing nodule within segment 4A. This represents a LI-RADS 3. Recommend follow-up MRI in 3-6 months.  3.  Mild splenomegaly.  4.  Small to moderate amount of ascites. Small periesophageal varices.    CT Abdomen Pelvis w/o Contrast  Result Date: 7/31/2025  EXAM: CT ABDOMEN PELVIS W/O CONTRAST LOCATION: [facility] DATE: 7/31/2025 INDICATION: Abdominal distention. COMPARISON: 7/16/2025. TECHNIQUE: CT scan of the abdomen and pelvis was performed without IV contrast. Multiplanar reformats were obtained. Dose reduction techniques were used. CONTRAST: None. FINDINGS: LOWER CHEST: Mild scattered atelectasis in the lung bases. No consolidation or significant pleural effusion. HEPATOBILIARY: Cirrhotic morphology of the liver redemonstrated. A 1 m cyst again seen in the left hepatic lobe. Large volume ascites is increased from prior. Gallbladder is normal on this noncontrast enhanced study. PANCREAS: Normal. SPLEEN: Mildly enlarged measuring 14.7 cm maximally. Capsular calcification along the lateral splenic surface is unchanged. ADRENAL GLANDS: Normal. KIDNEYS/BLADDER: Normal. BOWEL: No inflammatory bowel wall thickening or bowel obstruction. Appendix is normal. No free air. LYMPH NODES: Normal. VASCULATURE: Mild aortoiliac atherosclerotic calcifications. No abdominal aortic aneurysm. Multiple intra-abdominal venous collateral vessels present. PELVIC ORGANS: Normal. MUSCULOSKELETAL: Mild spinal degenerative changes. No suspicious osseous lesions or acute fractures.     IMPRESSION: 1.  Redemonstration of cirrhosis with large volume ascites. 2.  Findings of portal hypertension including mild splenomegaly and intra-abdominal venous collaterals.        EGD  7/3/25 ([facility]):  Findings:       Large (> 5 mm) varices were found in the lower third of the esophagus.        Five bands were successfully placed with complete eradication, resulting        in deflation of varices. There was no bleeding during and at the end of        the procedure.        Moderate portal hypertensive gastropathy was found in the entire        examined stomach.        The examined duodenum was normal.                                                                                    Impression:    - Large (> 5 mm) esophageal varices. Completely                          eradicated. Banded.                          - Portal hypertensive gastropathy.                          - Normal examined duodenum.                          - No specimens collected.   Recommendation:        - [facility].                          - Repeat upper endoscopy in 1 month for retreatment.        ASSESSMENT  Mr. Chowdhury is a 75-year-old gentleman with alcohol use disorder, recently diagnosed cirrhosis with decompensation evidenced by esophageal varices, ascites, colopathy; also RLS, aortic stenosis, recently diagnosed CHF (EF 50 to 55% on 7/3 ECHO).  This is his third admission in the past month; presented with abdominal distention and shortness of breath.    1.  Cirrhosis, decompensated  MELD Na and MELD 3.0 are 39 -- suggests poor prognosis.   [facility] DF is 67.  Would typically treat with steroids though have been holding of due to leukocytosis.    Unlikely transplant candidate due to age.  Complicated by:  1. Ascites (see below)  2. Coagulopathy - INR 1.92(gradually increasing)  3. Portal HTN -- eso varices (large). S/P banding 7/3/25. Due for follow-up 8/8/25.  Splenomegaly.   4.  Hyperbilirubinemia - suspect combo of cirrhosis and EtOH hepatitis; however, T bili usually peaks 1-2 weeks after onset of EtOH hepatitis.  Increase in bili likely due to underlying decompensation.  Last  EtOH 7/1.  T bili 26.9 on 7/16 versus 28.2 today.     No evidence of encephalopathy.  However, bowels have been a bit sluggish-- will start lactulose once daily.    2. Ascites  Diuretics on hold, given BRADLEY  Had been on lasix 20mg daily, spironolactone 25mg daily.   Plans are for paracentesis though limit volume due to renal function.     3.  BRADLEY  Creatinine was normal through July 17.  Now 3.7.   Will need further urine studies to assess HRS.  Noted plans for nephrology to see -appreciate their expertise.    4.  Hyponatremia  Fluid overload. Dehydration another factor.  Mental status appears at baseline.     Patient Active Problem List   Diagnosis    GI bleed    Secondary esophageal varices with bleeding (H)    ABLA (acute blood loss anemia)    Alcoholic cirrhosis of liver with ascites (H)    Alcoholism (H)    Lymphedema    Chronic systolic heart failure (H)    Loose stools    Restless legs syndrome (RLS)    Hyperbilirubinemia    Hyponatremia    Jaundice    Ascites due to alcoholic cirrhosis (H)    Leukocytosis, unspecified type    BRADLEY (acute kidney injury)    End stage liver disease (H)    Chronic diastolic heart failure (H)    Portal hypertension (H)    Secondary esophageal varices without bleeding (H)    Metabolic acidemia    Abdominal distension     PLAN  1. Paracenteses with fluid studies for analysis.  Limit to small volume.  2.  Diuretics on hold.  3.  Vitamin K 10 mg IV.  4.  Begin lactulose once daily.  5.  Continue sobriety  6.  Noted plans for nephrology to see -expertise appreciated.  7.  Follow-up EGD for esophageal varices is currently scheduled for August 8, 2025 as outpatient.    Thank you for asking to consult on this patient.    Mt Carter PA-C   8/1/2025 7:57 AM  [facility]  [phone]     A total of 60 minutes was spent on today's care.    ADDENDUM  Fluid studies from paracentesis returned showing SBP.  Pt on ceftriaxone.   Note pt had 6L paracentesis despite recommendations for  low volume paracentesis.  Pt receiving albumin.    -SBP adds to already worrisome prognosis.    Thank you  Mt Carter PA-C   8/1/2025 7:57 AM  [facility]  [phone]       GI ATTENDING BRIEF ADDENDUM:  The patient was seen and examined.  Discussed with Mt Carter.  Agree with findings and plan as above with the following addendum. 32 minutes were spent on providing patient care, including patient evaluation, reviewing documentation/test results, and , in addition to the time spent by the NP/PA.    Patient with acute on chronic liver decompensation.  Increase MELD now likely secondary to SBP.  Patient also has BRADLEY this suspected underlying hepatorenal syndrome.  We very much appreciate nephrology input.  Patient has a poor prognosis given BRADLEY and now SBP.  Fortunately patient is voiding.  Patient is not a transplant candidate secondary to being told by previous healthcare providers to stop drinking alcohol.  Patient is newly sober as of early July 2025.  Will give IV vitamin K to assess synthetic function.  Patient is currently mentating appropriately but we will add lactulose and Xifaxan to help optimize gut contamination.  Patient needs nutrition optimized. Continue ceftriaxone.     Melquiades Tejeda, DO  Gastroenterology  [facility]

## 2025-08-01 NOTE — CONSULTS
"    RENAL CONSULT NOTE    REQUESTING PHYSICIAN: Dr Mueller     REASON FOR CONSULT: BRADLEY    Consults      ASSESSMENT/PLAN:  ESLD declining course, severe portal HTN ascites, varices, now with rising bili 28  Stopped ETOH July 1.    BRADLEY- baseline normal renal fxn, bland UA last month and previously normal renal anatomy on imaging. Creat 0.8 7/17/25.   Now with reportedly non oliguric BRADLEY, severe volume overload, soft BP but no overt symptomatic hypotension, somewhat tense ascites. High risk this is HRS.   Plan UA, FENA ordered.   Agree with cautious LVP with albumin today   Then start albumin and diuretic challenge.   Midodrine and octreotide.   Terlipressin consideration if worse hypotesion/ oliguria.     Soft BP will start low dose midodrine.    Anemia mild hgb 11.6, plt normal     Coagulopathy INR 1.9    Ascites LVP ~3- 4 liters max with albumin.     Hyponatremia, trending better, serial labs, slow rate of correction.     Will follow.   Prognosis guarded.     Yamilet De Jesus MD  [facility]  [phone]      HPI: 74 yo man admitted for 3rd time this month with complications of recent dx of alcoholic liver dz/ cirrhosis and ascites and varices. 3 liter tap on 7/16  and started diuretics, planned to see GI in Sept.   Unfortunately c/o worse weakness, abd distension and SOB.  Little appetite, no n/v, scant BRB with stooling ?from straining, no other gross GI bleed.   Urine \"dark\" no dysuria but \"void every time I stool\" and feels he empties.   No nsaids or OTC meds.  No ETOH since July 1.   No orthostatic sx.     , retired, worked in wine/ beer industry  No tobacco    REVIEW OF SYSTEMS:  COMPLETE REVIEW OF SYSTEMS: as above or was negative.     Past Medical History:   Diagnosis Date    Alcoholic cirrhosis of liver with ascites (H)     Esophageal varices (H)     Peripheral edema     Restless legs syndrome (RLS)        @[facility]@      ALLERGIES/SENSITIVITIES:  Allergies   Allergen " Reactions    Penicillins Unknown    Sulfa Antibiotics Unknown     @SOCR(SUB)@  [unfilled]     PHYSICAL EXAM:  Physical Exam   Temp: 97.5  F (36.4  C) Temp src: Oral BP: 103/58 Pulse: 70   Resp: 18 SpO2: 97 % O2 Device: None (Room air)    Vitals:    08/01/25 0634   Weight: 101.6 kg (223 lb 14.4 oz)     Vital Signs with Ranges  Temp:  [97.5  F (36.4  C)-97.9  F (36.6  C)] 97.5  F (36.4  C)  Pulse:  [66-81] 70  Resp:  [14-29] 18  BP: ()/(51-58) 103/58  SpO2:  [94 %-97 %] 97 %  I/O last 3 completed shifts:  In: 700 [IV Piggyback:600]  Out: -     @TMAXR(24)@    Patient Vitals for the past 72 hrs:   Weight   08/01/25 0634 101.6 kg (223 lb 14.4 oz)   [unfilled]    General - A & O x 3, NAD  HEENT - +icterus o/p clear  Neck supple no jvd.  Respiratory - Lungs CTA bilat without wheeze, rhonchi, rales posteriorly  Cardiovascular - AP RRR with murmur all precordium  Abdomen massive distension with obvious ascites and abd wall pitting.   Extremities - well perfused, severe 3+ pitting extends to mid back level   Integumentary - intact, good turgor, no rash/lesions +_deep jaundice.  Neurologic - grossly intact no myoclonus  Psych:  Judgement intact, affect WNL  :  no madison.     Laboratory:     Recent Labs   Lab 08/01/25  0656 07/31/25  2215   WBC 13.3* 15.3*   RBC 3.44* 3.58*   HGB 11.6* 12.1*   HCT 32.7* 33.9*    277       Basic Metabolic Panel:  Recent Labs   Lab 08/01/25  0656 08/01/25  0152 07/31/25  2215   * 115* 114*   POTASSIUM 4.7  --  5.3   CHLORIDE 86*  --  86*   CO2 15*  --  14*   BUN 76.8*  --  76.1*   CR 3.77*  --  3.89*   *  --  190*   ANDRES 8.5*  --  8.4*       INR  Recent Labs   Lab 08/01/25  0656 07/31/25  2215   INR 1.92* 1.91*       Recent Labs   Lab Test 08/01/25 0656 07/31/25 2215   POTASSIUM 4.7 5.3   CHLORIDE 86* 86*   BUN 76.8* 76.1*      Recent Labs   Lab Test 08/01/25  0656 07/31/25 2215 07/03/25  0635 07/02/25  1759   ALBUMIN 2.7* 2.5*   < >  --    BILITOTAL 28.2* 26.5*   < >   --    ALT 83* 84*   < >  --    * 135*   < >  --    PROTEIN  --   --   --  Negative    < > = values in this interval not displayed.       Personally reviewed today's laboratory studies      Thank you for involving us in the care of this patient. We will continue to follow along with you.      Yamilet De Jesus MD   [facility]  [phone]

## 2025-08-01 NOTE — H&P
[facility]    History and Physical - Hospitalist Service       Date of Admission:  7/31/2025    Assessment & Plan      Malcom Chowdhury is a 75 year old male admitted on 7/31/2025.    Principal Problem:    End stage liver disease (H)  Active Problems:    Alcoholic cirrhosis of liver with ascites (H)    Hyperbilirubinemia    Hyponatremia    Ascites due to alcoholic cirrhosis (H)    BRADLEY (acute kidney injury)    Sepsis (H)    Chronic diastolic heart failure (H)      75 year old M has history of alcoholism, mood disorder, recently hospitalized 7/2-7/6, 7/16-7/17 found to have new diagnosis of bleeding esophageal varices and alcoholic cirrhosis, - Is on lasix /aldactone now with increasing shortness of breath and progressive abdominal distension. Pt has history of needing paracentesis with last reported tap near the beginning of the month-Paracentesis was without evidence of SBP. Pt also is markedly jaundiced. Pt reports abdominal pain 3/10.           Ascites due to alcoholic cirrhosis (H)    Alcoholic cirrhosis of liver with ascites (H)    Hyperbilirubinemia          Hyponatremia    BRADLEY (acute kidney injury)    # Recent diagnosis of alcoholic cirrhosis  # Alcoholic hepatitis- worsening  -new diagnosis of alcoholic cirrhosis during last hospital stay. Bilirubin was still climbing at time of discharge. Presented with severe jaundice, total bili up to 26  -MELD-Na up to 30 now. Prognosis is worrisome if labs do not start to improve soon  -last EtOH use ~2 weeks ago  -Per GI, steroid contraindicated given WBC of 14. WBC has come down now 11, he will follow up in 1 week and if WBC normal then likely would start steroids for alcoholic hepatitis at that time  -Labs about the same today. Cleared for discharge by GI     #Portal hypertension  # Large volume ascites due to above  -recently started Lasix and spironolactone to control ascites  -diagnostic paracentesis done 7/16 with 3L removed  -fluid studies  not c/w SBP  -GI arranged for their nurses to set up standing order for paracentesis PRN as outpatient     # Esophageal varices, recent banding 7/3  -no signs of ongoing bleeding  -Hgb higher than at recent discharge  -scheduled for followup EGD 8/8     # Alcoholism  -last EtOH use ~7/1  -states sobriety going well. He had one chem dep counseling session but did not like his counselor.  -recommend schedule with new counselor ASAP after discharge.    #RLS  - started on gabapentin during last hospital stay with much benefit  -he would like to change from TID to PRN dosing, reasonable     # Peripheral edema  - continue his home compression stockings  -follow up with his regular Vascular provider outpatient     Chronic stable issues:     # Recent diagnosis of chronic systolic heart failure  -recent Echocardiogram noting a mildly reduced LVEF 50 to 55%, with no prior echocardiogram available for review  -Suspect possibly myocarditis due to alcoholism.   -edema is better controlled now compared to discharge. Not in acute CHF  -recommend followup echo in 1-2 months while sober; if remains abnormal would have him see cardiology for eval     #Heart murmur  #Mild-moderate aortic stenosis  -followup echo as above     #9mm liver nodule  -MRI showed 9 mm hyperenhancing nodule in segment 4A of the liver  - Radiologist recommends follow-up MRI in 3 to 6 months          ***Hold outside hospital medication pending a confirmed pharmacy history and further reconciliation.  Initial orders were placed.  Workup and follow-up labs has been placed.   Current problem list also has been updated.  Request consultation to***orthopedics-appreciated assistance and recommendations.    Anticipated length of stay of more than ***2 midnights of hospitalization depending on progression, planning,findings,further testing or treatment needs. Services are medically necessary for evaluation and treatment of the acute & on chronic superimposed conditions  "if applicable with the treatment plan as outlined above.    More than 50% of time spent in Counselling & coordination of care.  Disposition:   Pending on further clinical progression, further evaluation findings and recommendations.          Diet: NPO for Procedure/Surgery per Anesthesia Guidelines Except for: Meds; Clear liquids before procedure/surgery: ADULT (Age GREATER than or Equal to 18 years) - Clear liquids 2 hours before procedure/surgery    DVT Prophylaxis: {DVT PROPHYLAXIS:413333}  Erazo Catheter: Not present  Lines: None     Cardiac Monitoring: ACTIVE order. Indication: QTc prolonging medication (48 hours)  Code Status:  ***    Clinically Significant Risk Factors Present on Admission   { TIP  This section helps capture the illness of the patient on admission.     - Review diagnoses highlighted in blue; right click, edit & delete if not appropriate   - If blank, no additional diagnoses identified   :58081}      # Hyponatremia: Lowest Na = 114 mmol/L in last 2 days, will monitor as appropriate  # Hypochloremia: Lowest Cl = 86 mmol/L in last 2 days, will monitor as appropriate      # Hypoalbuminemia: Lowest albumin = 2.5 g/dL at 7/31/2025 10:15 PM, will monitor as appropriate    # Coagulation Defect: INR = 1.91 (Ref range: 0.85 - 1.15) and/or PTT = 42 Seconds (Ref range: 22 - 38 Seconds), will monitor for bleeding   # Acute Kidney Injury, unspecified: based on a >150% or 0.3 mg/dL increase in last creatinine compared to past 90 day average, will monitor renal function            # Obesity: Estimated body mass index is 37.49 kg/m  as calculated from the following:    Height as of 7/2/25: 1.753 m (5' 9.02\").    Weight as of 7/16/25: 115.2 kg (254 lb).         # Financial/Environmental Concerns:           Disposition Plan   {TIP  It is required to update Medically Ready for Discharge [MRD] daily until the patient is 'Ready Now' (meets clinical goals). MRD reflects medical readiness -- not FINN (Expected " Discharge Date), which may be delayed by disposition. Last MRD-Anticipated in 2-4 Days  . Use the SmartList below to update for today:853367}  Medically Ready for Discharge: {TIME; MEDICALLY READY FOR DISCHARGE:92554389}           Thomas Tejada MD  Hospitalist Service  [facility]  Securely message with [employer] (more info)  Text page via [employer] Paging/Directory     ______________________________________________________________________    Chief Complaint   ***    {History obtained from:0346743}    History of Present Illness   Malcom Chowdhury is a 75 year old male who ***      Past Medical History    Past Medical History:   Diagnosis Date     Alcoholic cirrhosis of liver with ascites (H)      Esophageal varices (H)      Peripheral edema      Restless legs syndrome (RLS)        Past Surgical History   No past surgical history on file.    Prior to Admission Medications   Prior to Admission Medications   Prescriptions Last Dose Informant Patient Reported? Taking?   furosemide (LASIX) 20 MG tablet 7/31/2025 Morning  No Yes   Sig: Take 1 tablet (20 mg) by mouth daily.   gabapentin (NEURONTIN) 300 MG capsule   Yes Yes   Sig: Take 1 capsule (300 mg) by mouth 3 times daily as needed (restless legs).   multivitamin w/minerals (THERA-VIT-M) tablet 7/31/2025 Morning  No Yes   Sig: Take 1 tablet by mouth daily.   psyllium (METAMUCIL/KONSYL) capsule 7/31/2025 Morning  No Yes   Sig: Take 1 capsule by mouth daily.   spironolactone (ALDACTONE) 25 MG tablet 7/31/2025 Morning  No Yes   Sig: Take 1 tablet (25 mg) by mouth daily.   thiamine (B-1) 100 MG tablet 7/31/2025 Morning  No Yes   Sig: Take 1 tablet (100 mg) by mouth daily.      Facility-Administered Medications: None      {Additional Note Sections (OPTIONAL)  :033686}     Physical Exam   Vital Signs: Temp: 97.9  F (36.6  C) Temp src: Oral BP: 115/58 Pulse: 77   Resp: 23 SpO2: 94 % O2 Device: None (Room air)    Weight: 0 lbs 0 oz    {Recommend personal  "SmartPhrase or Notewriter for exam (OPTIONAL)    :672359}    Medical Decision Making   { TIP   MDM Calculator    MDM grid (w/ times)    Coding Support Chat  Billing is now based on time OR medical decision making complexity. Medical decision making included in your A&P does NOT need to be re-documented here.    :49173}    {Time  :676220::\"*** MINUTES SPENT BY ME on the date of service doing chart review, history, exam, documentation & further activities per the note.\"}      Data   {INSERT LABS/IMAGING (OPTIONAL)   :514799}  "

## 2025-08-01 NOTE — PLAN OF CARE
Problem: Adult Inpatient Plan of Care  Goal: Plan of Care Review  Description: The Plan of Care Review/Shift note should be completed every shift.  The Outcome Evaluation is a brief statement about your assessment that the patient is improving, declining, or no change.  This information will be displayed automatically on your shift  note.  Outcome: Progressing  Goal: Readiness for Transition of Care  Intervention: Mutually Develop Transition Plan  Recent Flowsheet Documentation  Taken 8/1/2025 0642 by Nagi Serra RN  Equipment Currently Used at Home: none     Problem: Pain Acute  Goal: Optimal Pain Control and Function  Outcome: Progressing     Problem: Gas Exchange Impaired  Goal: Optimal Gas Exchange  Outcome: Progressing   Goal Outcome Evaluation:       Pt alert & oriented. Denies pain. No SOB. Standby assist for ambulation. IVF Sodium bicarb 75mEq in D5Q infusing at 100ml/hr. On room air, Vital Signs WNL.

## 2025-08-01 NOTE — PROGRESS NOTES
[facility]    Medicine Progress Note - Hospitalist Service    Date of Admission:  7/31/2025    Assessment & Plan      Malcom Chowdhury is a 75 year old male admitted on 7/31/2025.  He has history of alcoholism, mood disorder, recently hospitalized 7/2-7/6, 7/16-7/17 found to have new diagnosis of bleeding esophageal varices and alcoholic cirrhosis, was on lasix /aldactone now with increasing shortness of breath and progressive abdominal distension. Pt has history of needing paracentesis with last reported tap 7/16-Paracentesis was without evidence of SBP. Pt also is markedly jaundiced. Pt reports abdominal pain 3/10.  He reports that this has been progressively worsening and feels similar to what he experienced prior to previous paracentesis.  He reports that he has an EGD scheduled for 8/8 but he does not have any repeat paracentesis scheduled.  He has not had much abdominal pain but has had some diarrhea.  He denies chest pain, fever, nausea, vomiting, or urinary symptoms.  He reports that he has been taking all of his medications as prescribed.     Vitals on arrival stable.  On initial exam, patient had a fairly significant abdominal distention but no focal abdominal tenderness palpation to suggest underlying SBP. CMP notable for hyponatremia with sodium 114, downtrending from previous. Creatinine also trending up at 3.89. Alk phos remains elevated at 166. AST and ALT elevated 135 and 84, respectively. Bilirubin also trending up at 26.5. Lactate elevated but could be in the setting of liver disease- r/o sepsis and SBP-WBC of 15.3, increasing from previous.     #Hyponatremia  #AGMA  #BRADLEY-prerenal, ?Hepatorenal syndrome  -- Suspect hyponatremia to be multifactorial: dilutional, dehydration, Pseudohyponatremia 2/2 hyperbilirubinemia.   -- No AMS  -- Recently started on Lasix and spironolactone to control ascites  -- CT-a/p: cirrhosis, large volume ascites, portal hypertension, mild  Written/documentated by Eli Garcia, acting as a scribe in Dr. Stephenson's presence.     COVID-19 Screening:    Do you have any fevers? No   Do you have a cough, shortness or breath, or respiratory symptoms? No   Have you been in contact (within 6 feet) of a patient with a laboratory confirmed coronavirus diagnosis? No   Have you traveled out of the country in the last 14 days? No        Any new medical problems since last visit? None      SUBJECTIVE:  Brandin Oconnor is a 61 year old male who is s/p left hallux medial border paronychia excision and matrixectomy completed 3/10/2020.  The tip of the left hallux is very sensitive. The lateral border of the left hallux toenail is tender but not as tender as the tip of the left hallux toenail. The patient denies any symptoms of infection. He brought pictures of the first and second digits some time ago. Patient has taken anti-biotics but the redness and tenderness keep returning afterwards. At night, he feels \"heat\" coming from the left hallux. The patient denies any exacerbated pain into the left 1st and 2nd digits. He is nervous about moving forward with the total nail avulsion. The patient did not experience any side effects from Keflex.      He sustained  left hallux injury in December and subsequent ingrowing medial nail border as well as persistent redness; pain and irritation and injury occurred most recently in December and has been red and aching with swelling since then.  Also has been tender to the touch.     Nature/Type: Inflammation  · Cutaneous abscess left great toe with recurrent paronychia  · --Cellulitis of the first and second digit left foot  · --Hammering of the digits 2 through 5 bilaterally    Location: Left foot: First and second digits left foot  Pain scale: Discomfort: 6/10. Worst: 7/10.   Duration/Onset: Ongoing for some time recently.  Patient states that it started flaring up in the past few days  Course: Worsening recently  Aggravating factors:  Shoe gear and weightbearing  Treatments: Self treatment in the past  --Previously seen on video visit 4/28/2020  --Previous use of Keflex and lidocaine ointment    Shoe Wear:    Gait: Normal  Guest: None      REVIEW OF SYSTEMS  Eye Problem(s):negative and glasses or contacts  ENT Problem(s):negative  Cardiovascular problem(s):negative  Respiratory problem(s):negative  Gastro-intestinal problem(s):negative GI  Genito-urinary problem(s):negative  Musculoskeletal problem(s):negative  Integumentary problem(s):negative  Neurological problem(s):negative  Psychiatric problem(s):negative  Endocrine problem(s):negative  Hematologic and/or Lymphatic problem(s):negative    Pertinent Hx  --Previously completed Lamisil for fungal nails - had significant improvement  ++He has completed previously rx'ed keflex regimen, denies GI upset  ALLERGIES:  No Known Allergies     Patient Active Problem List   Diagnosis   • Hyperlipidemia   • Gastroesophageal reflux disease with esophagitis   • Atkinson's esophagus without dysplasia   • Ingrown nail   • Paronychia of great toe, left   • Pain of toe of left foot   • Medial epicondylitis of elbow, left   • Left elbow pain   • Injury of left elbow   • Cellulitis of foot without toes, left   • Cellulitis and abscess of toe of left foot   • Cutaneous abscess of left foot        Current Outpatient Medications   Medication Sig   • amoxicillin-clavulanate (AUGMENTIN) 875-125 MG per tablet Take 1 tablet by mouth 2 times daily for 14 days.   • esomeprazole (NexIUM) 20 MG capsule Take 1 capsule by mouth 2 times daily.   • lidocaine (XYLOCAINE) 5 % ointment Apply topically as needed for Pain. Apply to first and second toes left foot twice daily   • omeprazole (PrilOSEC) 20 MG capsule TK 1 C PO BID   • metroNIDAZOLE (METROCREAM) 0.75 % cream Apply topically 2 times daily. For rosacea of face   • Alum Hydroxide-Mag Carbonate (GAVISCON PO)    • calcium carbonate (TUMS) 500 MG chewable tablet Chew 1 tablet  splenomegaly and intraabdominal venous collaterals. Normal kidney and bladder.  -- Monitor BMP trend  -- Avoid nephrotoxic meds  -- Nephrology consult appreciated: Albumin gtt q8, octreotide, midodrine, bumex q8h  -- IV fluids with bicarb- sodium q4 hrs. Na: 114-->117  -- Check serum osm, lipid panel- for pseudohyponatremia    #Severe sepsis with bradley, lactic acidosis  #SBP  #Lactic acidosis   -- SIRS (tachypnea, leukocytosis)   -- Bcx NGTD  -- IV antibiotics- empirically rocephin to cover broad-spectrum coverage for gram-positive negatives and anaerobes while  culture and sensitivities are pending  -- S/p Paracentesis with albumin on 08/01. 6.4L of hazy bhargavi colored fluid removed. Ascitic fluid analysis consistent with SBP. Culture pending      #Decompensated  alcoholic cirrhosis of liver with ascites   #Hyperbilirubinemia, Cholestatic LFTs  #Alcoholic hepatitis  -- Presented with severe jaundice, total bili up to 26-->28.2  MELD 3.0: 39 at 8/1/2025  6:56 AM  MELD-Na: 39 at 8/1/2025  6:56 AM  Calculated from:  Serum Creatinine: 3.77 mg/dL (Using max of 3 mg/dL) at 8/1/2025  6:56 AM  Serum Sodium: 117 mmol/L (Using min of 125 mmol/L) at 8/1/2025  6:56 AM  Total Bilirubin: 28.2 mg/dL at 8/1/2025  6:56 AM  Serum Albumin: 2.7 g/dL at 8/1/2025  6:56 AM  INR(ratio): 1.92 at 8/1/2025  6:56 AM  Age at listing (hypothetical): 75 years  Sex: Male at 8/1/2025  6:56 AM  -- Miki DF: 60's. Holding steroids due to SBP-defer to GI  -- Prognosis is worrisome  -- GI consult appreciated: Vitamin k 10mg iv. Begin lactulose once daily. Rosannao wup EGD for esophageal varices on 08/08/25    #Portal hypertension  #Large volume ascites due to above  -- Recently started on Lasix and spironolactone to control ascites- held with BRADLEY  (-last diagnostic paracentesis done 7/16 with 3L removed  -fluid studies not c/w SBP)     #Esophageal varices, recent banding 7/3  -- No signs of ongoing bleeding  -- Hgb stable  than at recent discharge  --  Scheduled for followup EGD 8/8     #Alcoholism  -- last EtOH use ~7/1 denies any active drinking  -- States sobriety going well. He had one chem dep counseling session but did not like his counselor.    #RLS  -- Started on gabapentin during last hospital stay with much benefit  PRN dosing     #Peripheral edema likely due to anasarca and liver cirrhosis and hyperammonemia  -- Continue his home compression stockings    Chronic anemia, mild  -- Monitor. No e/o bleeding       Chronic stable issues:     # Recent diagnosis of HFpEF  -- Recent Echocardiogram noting a mildly reduced LVEF 50 to 55%, with no prior echocardiogram available for review. Elevated LVFP  -- Suspect possibly myocarditis due to alcoholism.   -- Recommend follow up TTE in 1-2 months while sober. If remains abnormal, needs to see cards for evaluation    #Heart murmur  #Mild-moderate aortic stenosis  -followup echo as above     # 9mm liver nodule  -- MRI showed 9 mm hyperenhancing nodule in segment 4A of the liver  -- Radiologist recommends follow-up MRI in 3 to 6 months              Diet: NPO for Procedure/Surgery per Anesthesia Guidelines Except for: Meds; Clear liquids before procedure/surgery: ADULT (Age GREATER than or Equal to 18 years) - Clear liquids 2 hours before procedure/surgery    DVT Prophylaxis: Pneumatic Compression Devices  Erazo Catheter: Not present  Lines: None     Cardiac Monitoring: ACTIVE order. Indication: QTc prolonging medication (48 hours)  Code Status: Full Code      Clinically Significant Risk Factors Present on Admission         # Hyponatremia: Lowest Na = 114 mmol/L in last 2 days, will monitor as appropriate  # Hypochloremia: Lowest Cl = 86 mmol/L in last 2 days, will monitor as appropriate      # Hypoalbuminemia: Lowest albumin = 2.5 g/dL at 7/31/2025 10:15 PM, will monitor as appropriate  # Coagulation Defect: INR = 1.92 (Ref range: 0.85 - 1.15) and/or PTT = 42 Seconds (Ref range: 22 - 38 Seconds), will monitor for  by mouth daily.        Social History     Tobacco Use   • Smoking status: Never Smoker   • Smokeless tobacco: Never Used   Substance Use Topics   • Alcohol use: Yes     Comment: occ        No family history on file.     No past surgical history on file.         OBJECTIVE:  Recent Vitals     12/10/2020   1100 Most Recent Value     Weight: 94.3 kg (208 lb) 94.3 kg (208 lb)  as of 12/10/2020     Body Mass Index:  28.21 kg/m²Abnormal    6' (1.829 m)  as of 12/10/2020   94.3 kg (208 lb)  as of 12/10/2020     BP: -- 150/82Abnormal   as of 10/20/2020     Heart Rate: -- 77  as of 2/26/2020     Temp: -- 97.2 °F (36.2 °C)  as of 2/26/2020     Resp: -- 17  as of 2/26/2020     Height: 6' (1.829 m) 6' (1.829 m)  as of 12/10/2020       General: Physical exam demonstrates alert and oriented @AGE@ @SEX@ in no acute distress with good historian with adequate lower extremity hygiene.     Constitutional/appearance: No acute distress, normal appearance; well-developed    Psych: Normal attention and normal perception: Normal mood with normal affect: Normal speech: Cooperative: Normal thought content; no signs of cognitive or memory impairment    HENT: Normocephalic, atraumatic    Eyes: Pupils equally round and reactive to light, extraocular muscles intact; no foreign objects or discharge noted    Neck: Normal range of motion without pain: No rigidity noted    Lymphatics: No signs of edema or lymphatic disruption for the right or left lower extremities.  Normal lymphatics for the upper extremity bilaterally Constitutional/appearance: No acute distress, normal appearance; well-developed    Vascular: Femoral pulses 2/4 bilaterally.  Pedal pulses, including dorsalis pedis and posterior tibial pulses, 2/4 bilaterally.  Capillary fill time is less than 3 seconds, digits 1 through 5 of the lower extremities bilaterally.  Negative varicosities/negative Homans sign bilaterally.  Negative edema to the lower extremity/negative lymphedema,  "bleeding   # Acute Kidney Injury, unspecified: based on a >150% or 0.3 mg/dL increase in last creatinine compared to past 90 day average, will monitor renal function            # Obesity: Estimated body mass index is 33.05 kg/m  as calculated from the following:    Height as of 7/2/25: 1.753 m (5' 9.02\").    Weight as of this encounter: 101.6 kg (223 lb 14.4 oz).       # Financial/Environmental Concerns:           Social Drivers of Health    Tobacco Use: Unknown (7/16/2025)    Patient History     Smoking Tobacco Use: Never     Smokeless Tobacco Use: Unknown          Disposition Plan     Medically Ready for Discharge: Anticipated in 2-4 Days             Elaine Mueller MD  Hospitalist Service  [facility]  Securely message with [employer] (more info)  Text page via [facility] Paging/Directory   ______________________________________________________________________    Interval History   Patient is new to me today. Chart reviewed.  Patient is seen and examined at bedside.   Denied abdominal pain or fever. No diarrhea. Sober for a month per pt.  Plan of care discussed with patient. All questions answered. Pt verbalized understanding.     Physical Exam   Vital Signs: Temp: 97.5  F (36.4  C) Temp src: Oral BP: 103/58 Pulse: 70   Resp: 18 SpO2: 97 % O2 Device: None (Room air)    Weight: 223 lbs 14.4 oz    GEN: Alert and oriented. Not in acute distress.  HEENT: Atraumatic, mucous membrane- moist and pink. Icteric sclerae.  Chest: Bilateral air entry.  CVS: S1S2 regular.   Abdomen: Distended, non-tender. No guarding or rigidity. Bowel sounds active.   Extremities: No pedal edema.  CNS: No involuntary movements.  Skin: no cyanosis or clubbing.  Jaundice    Medical Decision Making       60 MINUTES SPENT BY ME on the date of service doing chart review, history, exam, documentation & further activities per the note.      Data     " bilateral lower extremities.  --Edema into the 1st and 2nd digits on the left     Neurological: Epicritic sensation is intact to the level of the digits, 1-5 bilaterally.  Sciota-Megan 5.07 monofilament is within normal limits to the level of the digits bilaterally.  Negative clonus bilaterally.  Negative Babinski bilaterally.  Deep tendon reflexes for both the Achilles and patellar reflexes are 2/4, bilateral lower extremities.     Dermatological: Inspection of the skin demonstrates no sign of open lesions bilaterally.  No subcutaneous nodules are noted bilaterally.  Skin is warm to warm from distal to proximal from the level of the digits to the level of the lower legs bilaterally.  Palpation of the skin demonstrates no indurations, no significant tightening bilaterally.  Nails are within normal limits bilaterally.  No atrophic changes to the skin noted bilaterally.  --Erythema and warmth into the 1st> 2nd digits on the left   --Toenail discoloration of the left hallux toenail.       Musculoskeletal:  Deformities: None  Range of motion: Intact  Muscle mass: normal  Muscle strength: 5/5 bilateral     IMAGING: I independently reviewed the images below:    EXAM: XR FOOT STANDING LEFT MIN 3 VIEWS: 1/5/2021    I independently reviewed the image of 3 view standing left foot xray today and results were discussed with the patient and appear below:     EXAM: XR FOOT STANDING LEFT MIN 3 VIEWS     DATE OF EXAM:1/5/2021 1:30 PM     COMPARISON: None.     PROVIDED CLINICAL HISTORY: Toe pain.     3 images  F/U redness and swelling distal 1 & 2 DLF x approx 1 year, NKI  AI      FINDINGS: Three standing views of the left foot are negative for fracture or dislocation.  The joint spaces are unremarkable.  No bone lesion or erosion.       Vascular calcifications are present.     IMPRESSION:   No acute bony abnormality.     Electronically Signed by: KERI PAEZ   Signed on: 1/5/2021 2:20 PM           LABORATORY DATA:  Orders  Only on 02/16/2021   Component Date Value Ref Range Status   • FINAL REPORT 02/16/2021 Microbiology results   Final   • FINAL REPORT 02/16/2021 Microbiology results   Final       ASSESSMENT      · S/p matrixectomy left hallux medial border  · Ingrown toenails bilaterally  · Equinus deformity bilaterally  · Cellulitis, 1st and 2nd toes, L     PLAN      Discussed findings, pathology and necessary testing. Developed a comprehensive wound management plan:    Discussion paronychia/ingrown toenail/treatment options:  Discussed the causes, implications, and treatment options regarding Ingrown toenails We discussed several treatment modalities such as further conservative, injections, medication, or surgical options that we may pursue if the patients concerns remain persistent with current conservative modalities - patient opts for total nail avulsion at this time.    I discussed the ingrown nail(s) for the patient.  I stated to the patient that the ingrown nail(s) may return after cutting, and may potentially cause pain and infection.  I explained the P&A (phenol & alcohol) matrixectomy procedure to the patient and advised that this can alleviate the pain from the ingrown nail by narrowing or removing the nail plate and permanently removing the ingrown border(s)/plate. I advised that this can be done under local anesthetic in the clinic.  I discussed the procedure, aftercare, and possibility of reoccurrence, inclusion cyst or residual deformity of the nail afterwards.    Surgical risks:    I discussed non-operative options and surgical options with the patient.  I reviewed the risks of surgery including but not limited to: infections, prolonged swelling, bruising after surgery, delayed or non-healing of soft tissues or bones, thick or sensitive scar tissue, joint pain, possible misalignment/malalignment of joint or limited joint motions, malalignment or misalignment of operative site with post-operative shifting,  shortening or deviation of digits if applicable,  hematoma or bleeding problems, circulatory impairment or tissue loss, DVT, loss of life/limb, reoccurrence,  poor results, need for additional or revisional procedures not foreseen intra-op or post-op, hardware complications/failure such as loosening or irritation of screws, pins, wires and plates if applicable, complications with implants if applicable, Stress reactions and stress fractures, transfer pain or lesions, temporary or permanent numbness/tingling, nerve entrapments or possible CRPS, reactions to anesthesia or complications.  I advised patient that there is a chance that they may not be happy with the results post-op and the need for further surgery is possible in the future.         I discussed wound care with the patient, and recommended that they follow the wound care instructions given which include keeping the wound covered with a clean, sterile dressing at all time and to avoid direct immersion into water.  Any signs of increased redness, warmth or drainage or pain are to be reported immediately or have the patient go to the ER or WIC.    Discussion arthritis:  --Patient does appear to have arthritic changes throughout multiple joints. I discussed how arthritis can affect multiple joints and or single joints and can be due to multiple factors including chronic use or trauma.  The painful and loss of function caused by arthritis and how over stress on the affected joint can lead to inflammation and pain.  I discussed how joint deviation, exostosis and chronic swelling can weaken and damage the joint and cartilage, which may lead to arthritic pain and pain with ROM.    --I explained to the patient that there are various options regarding treatment, including shoegear modification, relacing shoes to avoid pressure on the dorsal midfoot, orthotic therapy, cortisone injections, NSAIDs, and surgery.    --I recommend patient first trial the non-operative  treatments prior to surgery, but ultimately surgery may be needed to correct the bony deformity and realign and decompress the joint.  Possible surgical options could be exostectomy, decompression osteotomy, joint salvage, chondroplasty, nerve decompressions or fusions.    Discussion circulation:  We previously discussed circulation issues the patient is facing including inflammation swelling of the lower extremity as well as a diminishment in circulation to the feet and ankles bilaterally.  There is been an ongoing issue patient is been facing.  Discussed with the patient that his discomfort is likely related to circulatory changes to his bilateral lower extremities. Discussed the causes, implications, and treatment options regarding circulatory changes. We discussed several treatment modalities and it is my hope that his current Platel regimen along with Nitro-bid, which we will start at this time, will improve circulation to the lower extremities.    I discussed the patient's cellulitis and his prognosis. Without treatment with an antibiotic, cellulitis can be life-threatening.       · Examine patient discuss treatment options including conservative or surgical treatment  · The risks, benefits and possible complications of procedure were discussed. Patient relates that, due to the failure of conservative means, patient desires to proceed with procedure. The possible complications of the office procedure were discussed and include but are not limited to possible infection, residual pain or numbness or bleeding, recurrence of the condition, failure of implant, need for further treatment, complications of anesthesia, possibility of complications of tourniquet/digital tourniquet usage, possible wound dehiscence, residual swelling. The possible benefits were all discussed as well, though no guarantees are given. Advised the patient injections may initially hurt more prior to resolution of pain.   · --Patient underwent  procedure on the left hallux medial border on 3/10/2020. ++The site is healing well and I advised the patient that his residual redness at the excision site should resolve overtime with conservative treatment modalities.   · Advised patient to continue to utilize the Keflex that was previously ordered.  To continue to take it until fully completed.  · Advised patient to continue to evaluate the site and may cleanse area with warm soapy water.  He is to avoid alcohol peroxide or antibiotic ointment application.  Patient is also to continue to increase use of regular shoe gear.  If there is any issue or problem patient may contact our office.  · Advised patient that I believe he may have an infection trapped underneath the left hallux toenail at this time.   · Recommended we remove the entire left hallux toenail at this time.   Explained to the patient that the nail can take up to 9-12 months to fully regrow.   The risks, benefits and possible complications of the medication as well as proper usage and dosing were discussed with the patient as well as the possibility of side effects and what the patient should do in case of said side effects.  Patient voiced understanding with regard to these instructions.   The following medication: Augmentin was prescribed for the patient.  This medication is being utilized to help reduce or treat one or more the patient's underlying comorbidities.  I discussed with the patient at length the risks, benefits, possible complications of utilization of the this medication. Failure for the patient to obtain the following medication may result in long-term issues, including pain, poor/delayed healing, difficulty functioning in their job, regression or worsening symptoms over time .  It may also result in worsening of the underlying condition for which the patient is being treated. Failure of the patient's insurance company to allow the patient to utilize this medication for ANY reason,  made detrimentally affect their ability to return to full functional capacity.  Delayed/Denial by their insurance company to fill the medication for any reason, including but not limited to, the need for prior authorization may likewise lead to detrimental effects and inhibit patient's ability to return to full functional capacity.   Offered patient the possibility of a consult with Dr. Bishop.  They defer that at this time to relate a strong desire to proceed with surgical intervention intervention.    The patient opts to have the left hallux toenail removed.   I&D WITH TOTAL NAIL PLATE AVULSION of the left hallux great toe:- left hallux-  Affected toe(s) was anesthetized using 50/50 mix of 2% lidocaine and .5% marcaine, total of 3cc's.  Next, toe was cleansed with alcohol prep pad and iodine swab.  Utilizing a hemostat, affected nail border/plate was loosened and avulsed with hemostat.  No underlying laceration was noted.  The toe was then dressed with adaptic, antibiotic ointment and DSD under light compression.  Patient was advised of home wound care and soaking instructions.  Area was cleansed and lavaged and patient was given instructions on proper care and maintenance    · The patient to follow up in 3 weeks time for reevaluation.  Patient should call sooner with any new issues.  May consider further imaging if necessary including the possibility of an MRI or x-rays.        Scribe: Electronically signed: Eli Garcia has scribed for Dr. Stephenson  2/27/2021   I have reviewed and edited the progress note and agree with what has been scribed.Electronically signed by: Jessica Stephenson DPM

## 2025-08-01 NOTE — MEDICATION SCRIBE - ADMISSION MEDICATION HISTORY
Medication Scribe Admission Medication History    Admission medication history is complete. The information provided in this note is only as accurate as the sources available at the time of the update.    Information Source(s): Patient via in-person    Pertinent Information: Patient reports self management of medications.     Changes made to PTA medication list:  Added: None  Deleted: None  Changed: None    Allergies reviewed with patient and updates made in EHR: yes    Medication History Completed By: Ezekiel Escobedo 7/31/2025 10:49 PM    PTA Med List   Medication Sig Last Dose/Taking    furosemide (LASIX) 20 MG tablet Take 1 tablet (20 mg) by mouth daily. 7/31/2025 Morning    gabapentin (NEURONTIN) 300 MG capsule Take 1 capsule (300 mg) by mouth 3 times daily as needed (restless legs). Taking As Needed    multivitamin w/minerals (THERA-VIT-M) tablet Take 1 tablet by mouth daily. 7/31/2025 Morning    psyllium (METAMUCIL/KONSYL) capsule Take 1 capsule by mouth daily. 7/31/2025 Morning    spironolactone (ALDACTONE) 25 MG tablet Take 1 tablet (25 mg) by mouth daily. 7/31/2025 Morning    thiamine (B-1) 100 MG tablet Take 1 tablet (100 mg) by mouth daily. 7/31/2025 Morning

## 2025-08-02 LAB
ALBUMIN SERPL BCG-MCNC: 2.8 G/DL (ref 3.5–5.2)
ALP SERPL-CCNC: 124 U/L (ref 40–150)
ALT SERPL W P-5'-P-CCNC: 65 U/L (ref 0–70)
ANION GAP SERPL CALCULATED.3IONS-SCNC: 15 MMOL/L (ref 7–15)
ANION GAP SERPL CALCULATED.3IONS-SCNC: 16 MMOL/L (ref 7–15)
AST SERPL W P-5'-P-CCNC: 92 U/L (ref 0–45)
BASE EXCESS BLDV CALC-SCNC: -3.8 MMOL/L (ref -3–3)
BASOPHILS # BLD AUTO: 0.1 10E3/UL (ref 0–0.2)
BASOPHILS NFR BLD AUTO: 0 %
BILIRUB DIRECT SERPL-MCNC: 15.89 MG/DL (ref 0–0.3)
BILIRUB SERPL-MCNC: 23.1 MG/DL
BUN SERPL-MCNC: 79.2 MG/DL (ref 8–23)
CALCIUM SERPL-MCNC: 8.1 MG/DL (ref 8.8–10.4)
CHLORIDE SERPL-SCNC: 85 MMOL/L (ref 98–107)
CHLORIDE SERPL-SCNC: 88 MMOL/L (ref 98–107)
CREAT SERPL-MCNC: 4.04 MG/DL (ref 0.67–1.17)
EGFRCR SERPLBLD CKD-EPI 2021: 15 ML/MIN/1.73M2
EOSINOPHIL # BLD AUTO: 0.5 10E3/UL (ref 0–0.7)
EOSINOPHIL NFR BLD AUTO: 4 %
ERYTHROCYTE [DISTWIDTH] IN BLOOD BY AUTOMATED COUNT: 14.4 % (ref 10–15)
GLUCOSE SERPL-MCNC: 127 MG/DL (ref 70–99)
HCO3 BLDV-SCNC: 21 MMOL/L (ref 21–28)
HCO3 SERPL-SCNC: 17 MMOL/L (ref 22–29)
HCO3 SERPL-SCNC: 18 MMOL/L (ref 22–29)
HCT VFR BLD AUTO: 28.4 % (ref 40–53)
HGB BLD-MCNC: 10.4 G/DL (ref 13.3–17.7)
HOLD SPECIMEN: NORMAL
IMM GRANULOCYTES # BLD: 0.2 10E3/UL
IMM GRANULOCYTES NFR BLD: 2 %
LACTATE SERPL-SCNC: 2.3 MMOL/L (ref 0.7–2)
LYMPHOCYTES # BLD AUTO: 0.6 10E3/UL (ref 0.8–5.3)
LYMPHOCYTES NFR BLD AUTO: 5 %
MCH RBC QN AUTO: 34 PG (ref 26.5–33)
MCHC RBC AUTO-ENTMCNC: 36.6 G/DL (ref 31.5–36.5)
MCV RBC AUTO: 93 FL (ref 78–100)
MONOCYTES # BLD AUTO: 1.2 10E3/UL (ref 0–1.3)
MONOCYTES NFR BLD AUTO: 10 %
NEUTROPHILS # BLD AUTO: 9.3 10E3/UL (ref 1.6–8.3)
NEUTROPHILS NFR BLD AUTO: 79 %
NRBC # BLD AUTO: 0 10E3/UL
NRBC BLD AUTO-RTO: 0 /100
O2/TOTAL GAS SETTING VFR VENT: 21 %
OXYHGB MFR BLDV: 69 % (ref 70–75)
PCO2 BLDV: 35 MM HG (ref 40–50)
PH BLDV: 7.38 [PH] (ref 7.32–7.43)
PLATELET # BLD AUTO: 169 10E3/UL (ref 150–450)
PO2 BLDV: 39 MM HG (ref 25–47)
POTASSIUM SERPL-SCNC: 3.7 MMOL/L (ref 3.4–5.3)
POTASSIUM SERPL-SCNC: 4 MMOL/L (ref 3.4–5.3)
PROT SERPL-MCNC: 5.2 G/DL (ref 6.4–8.3)
RBC # BLD AUTO: 3.06 10E6/UL (ref 4.4–5.9)
SAO2 % BLDV: 70 % (ref 70–75)
SODIUM SERPL-SCNC: 116 MMOL/L (ref 135–145)
SODIUM SERPL-SCNC: 118 MMOL/L (ref 135–145)
SODIUM SERPL-SCNC: 119 MMOL/L (ref 135–145)
SODIUM SERPL-SCNC: 120 MMOL/L (ref 135–145)
SODIUM SERPL-SCNC: 120 MMOL/L (ref 135–145)
SODIUM SERPL-SCNC: 121 MMOL/L (ref 135–145)
SODIUM SERPL-SCNC: 121 MMOL/L (ref 135–145)
WBC # BLD AUTO: 11.7 10E3/UL (ref 4–11)

## 2025-08-02 PROCEDURE — 250N000013 HC RX MED GY IP 250 OP 250 PS 637: Performed by: INTERNAL MEDICINE

## 2025-08-02 PROCEDURE — 85014 HEMATOCRIT: CPT | Performed by: STUDENT IN AN ORGANIZED HEALTH CARE EDUCATION/TRAINING PROGRAM

## 2025-08-02 PROCEDURE — 36415 COLL VENOUS BLD VENIPUNCTURE: CPT | Performed by: INTERNAL MEDICINE

## 2025-08-02 PROCEDURE — 99232 SBSQ HOSP IP/OBS MODERATE 35: CPT | Performed by: INTERNAL MEDICINE

## 2025-08-02 PROCEDURE — 99232 SBSQ HOSP IP/OBS MODERATE 35: CPT | Performed by: STUDENT IN AN ORGANIZED HEALTH CARE EDUCATION/TRAINING PROGRAM

## 2025-08-02 PROCEDURE — 36415 COLL VENOUS BLD VENIPUNCTURE: CPT | Performed by: STUDENT IN AN ORGANIZED HEALTH CARE EDUCATION/TRAINING PROGRAM

## 2025-08-02 PROCEDURE — 258N000003 HC RX IP 258 OP 636: Performed by: INTERNAL MEDICINE

## 2025-08-02 PROCEDURE — 82374 ASSAY BLOOD CARBON DIOXIDE: CPT | Performed by: INTERNAL MEDICINE

## 2025-08-02 PROCEDURE — 250N000013 HC RX MED GY IP 250 OP 250 PS 637: Performed by: PHYSICIAN ASSISTANT

## 2025-08-02 PROCEDURE — 83605 ASSAY OF LACTIC ACID: CPT | Performed by: INTERNAL MEDICINE

## 2025-08-02 PROCEDURE — 82247 BILIRUBIN TOTAL: CPT | Performed by: STUDENT IN AN ORGANIZED HEALTH CARE EDUCATION/TRAINING PROGRAM

## 2025-08-02 PROCEDURE — 84295 ASSAY OF SERUM SODIUM: CPT | Performed by: INTERNAL MEDICINE

## 2025-08-02 PROCEDURE — 250N000013 HC RX MED GY IP 250 OP 250 PS 637: Performed by: HOSPITALIST

## 2025-08-02 PROCEDURE — 80051 ELECTROLYTE PANEL: CPT | Performed by: STUDENT IN AN ORGANIZED HEALTH CARE EDUCATION/TRAINING PROGRAM

## 2025-08-02 PROCEDURE — 250N000012 HC RX MED GY IP 250 OP 636 PS 637: Mod: JW,JA | Performed by: INTERNAL MEDICINE

## 2025-08-02 PROCEDURE — 82805 BLOOD GASES W/O2 SATURATION: CPT | Performed by: INTERNAL MEDICINE

## 2025-08-02 PROCEDURE — 250N000011 HC RX IP 250 OP 636: Performed by: HOSPITALIST

## 2025-08-02 PROCEDURE — 250N000011 HC RX IP 250 OP 636: Mod: JZ | Performed by: INTERNAL MEDICINE

## 2025-08-02 PROCEDURE — P9047 ALBUMIN (HUMAN), 25%, 50ML: HCPCS | Performed by: INTERNAL MEDICINE

## 2025-08-02 PROCEDURE — 250N000011 HC RX IP 250 OP 636: Performed by: INTERNAL MEDICINE

## 2025-08-02 PROCEDURE — 120N000001 HC R&B MED SURG/OB

## 2025-08-02 RX ORDER — MIDODRINE HYDROCHLORIDE 5 MG/1
10 TABLET ORAL
Status: DISCONTINUED | OUTPATIENT
Start: 2025-08-02 | End: 2025-08-06

## 2025-08-02 RX ORDER — LOPERAMIDE HYDROCHLORIDE 2 MG/1
2 CAPSULE ORAL 3 TIMES DAILY PRN
Status: DISCONTINUED | OUTPATIENT
Start: 2025-08-02 | End: 2025-08-09 | Stop reason: HOSPADM

## 2025-08-02 RX ORDER — BUMETANIDE 0.25 MG/ML
4 INJECTION, SOLUTION INTRAMUSCULAR; INTRAVENOUS 2 TIMES DAILY
Status: DISCONTINUED | OUTPATIENT
Start: 2025-08-03 | End: 2025-08-03

## 2025-08-02 RX ADMIN — BUMETANIDE 4 MG: 0.25 INJECTION INTRAMUSCULAR; INTRAVENOUS at 09:08

## 2025-08-02 RX ADMIN — BUMETANIDE 4 MG: 0.25 INJECTION INTRAMUSCULAR; INTRAVENOUS at 16:09

## 2025-08-02 RX ADMIN — THIAMINE HCL TAB 100 MG 100 MG: 100 TAB at 09:08

## 2025-08-02 RX ADMIN — RIFAXIMIN 550 MG: 550 TABLET ORAL at 13:22

## 2025-08-02 RX ADMIN — RIFAXIMIN 550 MG: 550 TABLET ORAL at 09:08

## 2025-08-02 RX ADMIN — ALBUMIN HUMAN 25 G: 0.25 SOLUTION INTRAVENOUS at 06:20

## 2025-08-02 RX ADMIN — CEFTRIAXONE SODIUM 2 G: 2 INJECTION, POWDER, FOR SOLUTION INTRAMUSCULAR; INTRAVENOUS at 00:06

## 2025-08-02 RX ADMIN — PHYTONADIONE 10 MG: 10 INJECTION, EMULSION INTRAMUSCULAR; INTRAVENOUS; SUBCUTANEOUS at 15:24

## 2025-08-02 RX ADMIN — LACTULOSE SOLUTION USP, 10 G/15 ML 10 G: 10 SOLUTION ORAL; RECTAL at 09:07

## 2025-08-02 RX ADMIN — MIDODRINE HYDROCHLORIDE 10 MG: 5 TABLET ORAL at 13:21

## 2025-08-02 RX ADMIN — MIDODRINE HYDROCHLORIDE 10 MG: 5 TABLET ORAL at 09:07

## 2025-08-02 RX ADMIN — RIFAXIMIN 550 MG: 550 TABLET ORAL at 21:06

## 2025-08-02 RX ADMIN — ALBUMIN HUMAN 25 G: 0.25 SOLUTION INTRAVENOUS at 23:41

## 2025-08-02 RX ADMIN — LOPERAMIDE HYDROCHLORIDE 2 MG: 2 CAPSULE ORAL at 00:47

## 2025-08-02 RX ADMIN — ALBUMIN HUMAN 25 G: 0.25 SOLUTION INTRAVENOUS at 14:38

## 2025-08-02 RX ADMIN — MIDODRINE HYDROCHLORIDE 10 MG: 5 TABLET ORAL at 16:09

## 2025-08-02 RX ADMIN — Medication 1 TABLET: at 09:08

## 2025-08-02 RX ADMIN — OCTREOTIDE ACETATE 50 MCG/HR: 500 INJECTION, SOLUTION INTRAVENOUS; SUBCUTANEOUS at 14:36

## 2025-08-02 ASSESSMENT — ACTIVITIES OF DAILY LIVING (ADL)
ADLS_ACUITY_SCORE: 41

## 2025-08-02 NOTE — PROGRESS NOTES
Care Management Follow Up    Length of Stay (days): 2    Expected Discharge Date: 08/03/2025     Concerns to be Addressed:   discharge planing    Patient plan of care discussed at interdisciplinary rounds: Yes    Anticipated Discharge Disposition:  TBD      Anticipated Discharge Services:  TBD  Anticipated Discharge DME:  TBD    Private pay costs discussed: Not applicable    Discussed  Partnership in Safe Discharge Planning  document with patient/family: No     Handoff Completed: No, handoff not indicated or clinically appropriate    Additional Information:  Per provider, patient is not medically ready for discharge and might require transfer to ICU. Plan is for discharge to home when medically ready.    Next Steps: CM will continue to follow medical progression of care, discharge recommendations, and assist with final discharge plan.      Gricelda Diaz, MURIELW

## 2025-08-02 NOTE — PROGRESS NOTES
[facility]    Medicine Progress Note - Hospitalist Service    Date of Admission:  7/31/2025    Assessment & Plan      Malcom Chowdhury is a 75 year old male admitted on 7/31/2025.  He has history of alcoholism, mood disorder, recently hospitalized 7/2-7/6, 7/16-7/17 found to have new diagnosis of bleeding esophageal varices and alcoholic cirrhosis, was on lasix /aldactone now with increasing shortness of breath and progressive abdominal distension. Pt has history of needing paracentesis with last reported tap 7/16-Paracentesis was without evidence of SBP. Pt also is markedly jaundiced. Pt reports abdominal pain 3/10.  He reports that this has been progressively worsening and feels similar to what he experienced prior to previous paracentesis.  He reports that he has an EGD scheduled for 8/8 but he does not have any repeat paracentesis scheduled.  He has not had much abdominal pain but has had some diarrhea.  He denies chest pain, fever, nausea, vomiting, or urinary symptoms.  He reports that he has been taking all of his medications as prescribed.     Vitals on arrival stable.  On initial exam, patient had a fairly significant abdominal distention but no focal abdominal tenderness palpation to suggest underlying SBP. CMP notable for hyponatremia with sodium 114, downtrending from previous. Creatinine also trending up at 3.89. Alk phos remains elevated at 166. AST and ALT elevated 135 and 84, respectively. Bilirubin also trending up at 26.5. Lactate elevated but could be in the setting of liver disease- r/o sepsis and SBP-WBC of 15.3, increasing from previous.     #Hyponatremia, severe  #AGMA  #BRADLEY-prerenal, ?Hepatorenal syndrome  -- Suspect hyponatremia to be multifactorial: dilutional, dehydration, Pseudohyponatremia 2/2 hyperbilirubinemia.   -- No AMS  -- Recently started on Lasix and spironolactone to control ascites  -- CT-a/p: cirrhosis, large volume ascites, portal hypertension, mild  splenomegaly and intraabdominal venous collaterals. Normal kidney and bladder.  -- Monitor BMP trend  -- Avoid nephrotoxic meds  -- Nephrology consult appreciated: Albumin gtt q8, octreotide, midodrine, bumex q8h  -- S/p bicarb- sodium q4 hrs. Na: 114-->117-->119  -- Check serum osm, lipid panel- for pseudohyponatremia    #Severe sepsis with bradley, lactic acidosis  #SBP  #Lactic acidosis   -- SIRS (tachypnea, leukocytosis)   -- Bcx NGTD  -- IV antibiotics- empirically rocephin to cover broad-spectrum coverage for gram-positive negatives and anaerobes while  culture and sensitivities are pending  -- S/p Paracentesis with albumin on 08/01. 6.4L of hazy bhargavi colored fluid removed. Ascitic fluid analysis consistent with SBP. Culture pending      #Decompensated  alcoholic cirrhosis of liver with ascites   #Hyperbilirubinemia, Cholestatic LFTs  #Alcoholic hepatitis  -- Presented with severe jaundice, total bili up to 26-->28.2  MELD 3.0: 39 at 8/2/2025  9:39 AM  MELD-Na: 39 at 8/2/2025  9:39 AM  Calculated from:  Serum Creatinine: 4.04 mg/dL (Using max of 3 mg/dL) at 8/2/2025  6:06 AM  Serum Sodium: 119 mmol/L (Using min of 125 mmol/L) at 8/2/2025  9:39 AM  Total Bilirubin: 23.1 mg/dL at 8/2/2025  6:06 AM  Serum Albumin: 2.8 g/dL at 8/2/2025  6:06 AM  INR(ratio): 1.92 at 8/1/2025  6:56 AM  Age at listing (hypothetical): 75 years  Sex: Male at 8/2/2025  9:39 AM     -- Miki DF: 60's. Holding steroids due to SBP-defer to GI  -- Prognosis is worrisome  -- GI consult appreciated: Vitamin k 10mg iv. Begin lactulose once daily. Follo wup EGD for esophageal varices on 08/08/25    #Portal hypertension  #Large volume ascites due to above  -- Recently started on Lasix and spironolactone to control ascites- held with BRADLEY  (-last diagnostic paracentesis done 7/16 with 3L removed  -fluid studies not c/w SBP)     #Esophageal varices, recent banding 7/3  -- No signs of ongoing bleeding  -- Hgb stable  than at recent discharge  --  Scheduled for followup EGD 8/8     #Alcoholism  -- last EtOH use ~7/1 denies any active drinking  -- States sobriety going well. He had one chem dep counseling session but did not like his counselor.    #RLS  -- Started on gabapentin during last hospital stay with much benefit  PRN dosing     #Peripheral edema likely due to anasarca and liver cirrhosis and hyperammonemia  -- Continue his home compression stockings    Chronic anemia, mild  -- Monitor. No e/o bleeding       Chronic stable issues:     # Recent diagnosis of HFpEF  -- Recent Echocardiogram noting a mildly reduced LVEF 50 to 55%, with no prior echocardiogram available for review. Elevated LVFP  -- Suspect possibly myocarditis due to alcoholism.   -- Recommend follow up TTE in 1-2 months while sober. If remains abnormal, needs to see cards for evaluation    #Heart murmur  #Mild-moderate aortic stenosis  -followup echo as above     # 9mm liver nodule  -- MRI showed 9 mm hyperenhancing nodule in segment 4A of the liver  -- Radiologist recommends follow-up MRI in 3 to 6 months              Diet: 2 Gram Sodium Diet  Snacks/Supplements Adult: Ensure Plus High Protein; Between Meals  Fluid restriction 1200 ML FLUID    DVT Prophylaxis: Pneumatic Compression Devices  Erazo Catheter: PRESENT, indication:    Lines: None     Cardiac Monitoring: ACTIVE order. Indication: QTc prolonging medication (48 hours)  Code Status: Full Code      Clinically Significant Risk Factors         # Hyponatremia: Lowest Na = 114 mmol/L in last 2 days, will monitor as appropriate  # Hypochloremia: Lowest Cl = 85 mmol/L in last 2 days, will monitor as appropriate      # Hypoalbuminemia: Lowest albumin = 2.5 g/dL at 7/31/2025 10:15 PM, will monitor as appropriate    # Coagulation Defect: INR = 1.92 (Ref range: 0.85 - 1.15) and/or PTT = 42 Seconds (Ref range: 22 - 38 Seconds), will monitor for bleeding   # Acute Kidney Injury, unspecified: based on a >150% or 0.3 mg/dL increase in last  creatinine compared to past 90 day average, will monitor renal function                   # Financial/Environmental Concerns:           Social Drivers of Health    Tobacco Use: Unknown (7/16/2025)    Patient History     Smoking Tobacco Use: Never     Smokeless Tobacco Use: Unknown          Disposition Plan     Medically Ready for Discharge: Anticipated in 2-4 Days             Elaine Mueller MD  Hospitalist Service  [facility]  Securely message with [employer] (more info)  Text page via [employer] Paging/Directory   ______________________________________________________________________    Interval History   Patient is seen and examined at bedside.   Feeling about the same.  Plan of care discussed with patient. All questions answered. Pt verbalized understanding.     Physical Exam   Vital Signs: Temp: 97.6  F (36.4  C) Temp src: Oral BP: 108/55 Pulse: 68   Resp: 16 SpO2: 96 % O2 Device: None (Room air)    Weight: 233 lbs 11 oz    GEN: Alert and oriented. Not in acute distress.  HEENT: Atraumatic, mucous membrane- moist and pink. Icteric sclerae.  Chest: Bilateral air entry.  CVS: S1S2 regular.   Abdomen: Distended, non-tender. No guarding or rigidity. Bowel sounds active.   Extremities: No pedal edema.  CNS: No involuntary movements.  Skin: no cyanosis or clubbing.  Jaundice    Medical Decision Making       55 MINUTES SPENT BY ME on the date of service doing chart review, history, exam, documentation & further activities per the note.      Data

## 2025-08-02 NOTE — PROGRESS NOTES
"CLINICAL NUTRITION SERVICES - ASSESSMENT NOTE    RECOMMENDATIONS FOR MDs/PROVIDERS TO ORDER:  None    Registered Dietitian Interventions:  Medical food supplement therapy - Special K bar BID, Ensure HP daily    Future/Additional Recommendations:  Monitor po intake, ONS needs, nutrition hx, NFPE     REASON FOR ASSESSMENT  Positive admission nutrition risk screen: 24-33 lb wt loss, decreased appetite    INFORMATION OBTAINED  Patient not available for interview due to pt not answering phone x2 calls. RD working remotely today    NUTRITION HISTORY  Pt seen by RD for low sodium diet on .    CURRENT NUTRITION ORDERS  Diet: 2 g Sodium and 1200 mL Fluid Restriction (diet started  at noon)  Snacks/Supplements: Ensure Plus High Protein daily @8pm    CURRENT INTAKE/TOLERANCE  Pt ordering adequate meals at regular mealtimes. 100% intakes noted in flow sheets.  Pt consumed 1335 kcal and 61 g PRO between 2 meals yesterday + 1 Ensure HP    LABS  Nutrition-relevant labs: Na: 119 (L) improving, BUN: 79.2 (H), Cr: 4.04 (H), Tbili: 23.1 (H), B    MEDICATIONS  Nutrition-relevant medications: Bumex, Lactulose, Thera-Vit-M, thiamine 100 mg daily, imodium    ANTHROPOMETRICS  Height: 175.3 cm (5' 9\")  Admission Weight: 101.6 kg (223 lb 14.4 oz) (25 0634)   Most Recent Weight: 106 kg (233 lb 11 oz) (25 0552)  IBW: 72.7 kg  BMI: Body mass index is 34.49 kg/m .   Weight History:   25 : 106 kg (233 lb 11 oz) Bed scale  25 : 108.6 kg (239 lb 6.4 oz) Standing scale - wt discrepancy?  25 : 101.6 kg (223 lb 14.4 oz) Standing scale - admission wt  25 : 115.2 kg (254 lb)  25 : 114.9 kg (253 lb 5 oz)  13% wt loss in 2 weeks - Pt has frequent paracentesis, so difficult to tell true wt loss vs fluid loss. Unlikely pt has lost this much true wt this quickly    Dosing Weight: 80 kg, based on adjusted wt    ASSESSED NUTRITION NEEDS  Estimated Energy Needs: 7186-6155 kcals/day (20 - 25 " kcals/kg)  Justification: Obese  Estimated Protein Needs:  grams protein/day (1.2 - 1.5 grams of pro/kg)  Justification: Increased needs with cirrhosis  Estimated Fluid Needs: <1200 mL/day   Justification: On a fluid restriction    SYSTEM AND PHYSICAL FINDINGS    Pt markedly jaundiced. Has abdominal distension.  GI symptoms: LBM on 8/2  Skin/wounds: Edema: 3-4+ in BLE legs, 2+ generalized    MALNUTRITION  % Intake: Unable to assess  % Weight Loss: > 5% in 1 month (severe)   Subcutaneous Fat Loss: Unable to assess  Muscle Loss: Unable to assess  Fluid Accumulation/Edema: Severe, 4+  Malnutrition Diagnosis: Severe malnutrition in the context of acute illness or injury  Malnutrition Present on Admission: Yes    NUTRITION DIAGNOSIS  Malnutrition (undernutrition) related to liver cirrhosis, frequent paracentesis as evidenced by >5% wt loss in 1 month, 4+ edema in BLE    INTERVENTIONS  Medical food supplement therapy - Special K bar BID, Ensure HP daily    GOALS  Total avg nutritional intake to meet a minimum of 20 kcal/kg and 1.2 g PRO/kg daily (per dosing wt 80 kg).     MONITORING/EVALUATION  Progress toward goals will be monitored and evaluated per policy.

## 2025-08-02 NOTE — PLAN OF CARE
Problem: Adult Inpatient Plan of Care  Goal: Plan of Care Review  Description: The Plan of Care Review/Shift note should be completed every shift.  The Outcome Evaluation is a brief statement about your assessment that the patient is improving, declining, or no change.  This information will be displayed automatically on your shift  note.  Outcome: Progressing     Problem: Pain Acute  Goal: Optimal Pain Control and Function  Outcome: Progressing  Intervention: Prevent or Manage Pain  Recent Flowsheet Documentation  Taken 8/2/2025 0000 by Jg Brooks RN  Medication Review/Management: medications reviewed  Taken 8/1/2025 2000 by Jg Brooks RN  Medication Review/Management: medications reviewed     Problem: Liver Failure  Goal: Optimal Coping with Liver Failure  Outcome: Progressing     Problem: Liver Failure  Goal: Fluid and Electrolyte Balance  Outcome: Progressing     Problem: Liver Failure  Goal: Optimal Gastrointestinal Function  Outcome: Progressing     Problem: Liver Failure  Goal: Blood Glucose Level Within Target Range  Outcome: Progressing     Problem: Liver Failure  Goal: Absence of Infection Signs and Symptoms  Outcome: Progressing     Problem: Liver Failure  Goal: Optimal Neurologic Function  Outcome: Progressing  Intervention: Monitor and Optimize Neurologic Status  Recent Flowsheet Documentation  Taken 8/2/2025 0000 by Jg Brooks RN  Head of Bed (HOB) Positioning: HOB at 20-30 degrees  Taken 8/1/2025 2000 by Jg Brooks RN  Head of Bed (HOB) Positioning: HOB at 20-30 degrees     Problem: Liver Failure  Goal: Improved Oral Intake  Outcome: Progressing     Problem: Comorbidity Management  Goal: Maintenance of Heart Failure Symptom Control  Outcome: Progressing  Intervention: Maintain Heart Failure Management  Recent Flowsheet Documentation  Taken 8/2/2025 0000 by Jg Brooks RN  Medication Review/Management: medications reviewed  Taken 8/1/2025 2000 by Cecilia  Jg RN  Medication Review/Management: medications reviewed    Goal Outcome Evaluation:    Patient AOX4. Able to make needs known. VSS overall; BP slightly low overnight; notified provider who advised to continue to monitor. Denies SOB, pain and nausea. Voiding well overall; multiple loose BM's reported; notified provider and received order for anti-diarrheal; administered with positive results. Tele - Sinus with 1st degree block. Multiple critical labs received; see flowsheet; MD notified and aware. Sleeping between cares.

## 2025-08-02 NOTE — PLAN OF CARE
Problem: Gas Exchange Impaired  Goal: Optimal Gas Exchange  Outcome: Progressing  Intervention: Optimize Oxygenation and Ventilation  Recent Flowsheet Documentation  Taken 8/2/2025 0940 by Venita Muse RN  Head of Bed ([facility]) Positioning: [facility] at 20-30 degrees     Problem: Liver Failure  Goal: Optimal Coping with Liver Failure  Outcome: Progressing  Goal: Absence of Bleeding  Outcome: Progressing  Goal: Fluid and Electrolyte Balance  Outcome: Progressing  Goal: Optimal Gastrointestinal Function  Outcome: Progressing  Goal: Blood Glucose Level Within Target Range  Outcome: Progressing  Goal: Hemodynamic Stability  Outcome: Progressing  Goal: Absence of Infection Signs and Symptoms  Outcome: Progressing  Goal: Optimal Neurologic Function  Outcome: Progressing  Intervention: Monitor and Optimize Neurologic Status  Recent Flowsheet Documentation  Taken 8/2/2025 0940 by Venita Muse RN  Head of Bed ([facility]) Positioning: [facility] at 20-30 degrees  Goal: Improved Oral Intake  Outcome: Progressing  Goal: Optimal Pain Control, Comfort and Function  Outcome: Progressing  Goal: Effective Oxygenation and Ventilation  Outcome: Progressing  Intervention: Promote Airway Secretion Clearance  Recent Flowsheet Documentation  Taken 8/2/2025 0940 by Venita Muse RN  Activity Management:   activity adjusted per tolerance   ambulated to bathroom   up in chair  Intervention: Optimize Oxygenation and Ventilation  Recent Flowsheet Documentation  Taken 8/2/2025 0940 by Venita Muse RN  Head of Bed ([facility]) Positioning: [facility] at 20-30 degrees     Problem: Acute Kidney Injury/Impairment  Goal: Fluid and Electrolyte Balance  Outcome: Progressing  Goal: Improved Oral Intake  Outcome: Progressing  Goal: Effective Renal Function  Outcome: Progressing  Intervention: Monitor and Support Renal Function  Recent Flowsheet Documentation  Taken 8/2/2025 0940 by Venita Muse RN  Medication Review/Management: medications reviewed    Goal Outcome Evaluation:         Pt sleepy this afternoon. Legs remain grossly edematous with compressions stockings in place. Erazo catheter placed, good urine output. Pt not drinking much, explained FR. No c/o pain. 1 stool this shift.

## 2025-08-02 NOTE — PROGRESS NOTES
Gastroenterology Progress Note    Consultation Indication:    Subjective:    Reports feeling better today, had some diarrhea yesterday    Objective:  Vital signs in last 24 hours  Temp:  [97.6  F (36.4  C)-98  F (36.7  C)] 97.6  F (36.4  C)  Pulse:  [65-71] 68  Resp:  [16-20] 16  BP: ()/(46-58) 108/55  SpO2:  [96 %-98 %] 96 % O2 Device: None (Room air)      Constitutional: healthy, alert, and no distress   Eyes: no scleral icterus   Respiratory: no conversational dyspnea  Abdomen: distended but non-tended abdomen  Neuro: moves all extremities spontaneously   Skin: appears grossly normal, no jaundice  Joint/Extremities: visible extremities appeared grossly normal      LABORATORY    ELECTROLYTE PANEL   Recent Labs   Lab 08/02/25  0939 08/02/25  0606 08/02/25  0200 08/01/25  1419 08/01/25  0656 08/01/25  0152 07/31/25 2215   * 118* 116*   < > 117*   < > 114*   POTASSIUM  --  4.0  --   --  4.7  --  5.3   CHLORIDE  --  85*  --   --  86*  --  86*   CO2  --  18*  --   --  15*  --  14*   GLC  --  127*  --   --  158*  --  190*   CR  --  4.04*  --   --  3.77*  --  3.89*   BUN  --  79.2*  --   --  76.8*  --  76.1*    < > = values in this interval not displayed.      HEMATOLOGY PANEL   Recent Labs   Lab 08/02/25  0606 08/01/25  0656 07/31/25 2215   HGB 10.4* 11.6* 12.1*   MCV 93 95 95   WBC 11.7* 13.3* 15.3*    240 277   INR  --  1.92* 1.91*      LIVER AND PANCREAS PANEL   Recent Labs   Lab 08/02/25  0606 08/01/25  0656 07/31/25 2215   AST 92* 131* 135*   ALT 65 83* 84*   ALKPHOS 124 155* 166*   BILITOTAL 23.1* 28.2* 26.5*   LIPASE  --   --  212*     IMAGING STUDIES    US Paracentesis with Albumin  Result Date: 8/1/2025  EXAM: 1. PARACENTESIS 2. ULTRASOUND GUIDANCE LOCATION: [facility] DATE: 8/1/2025 INDICATION: Ascites. PROCEDURE: Informed consent obtained. Time out performed. The abdomen was prepped and draped in a sterile fashion. 5 mL of 1% lidocaine was infused into local soft  tissues. A 5 Liberian catheter system was introduced into the abdominal ascites under ultrasound guidance. 6.4 liters of hazy bhargavi-colored fluid were removed and sent to lab if requested. Patient tolerated procedure well. Ultrasound imaging was obtained and placed in the patient's permanent medical record.     IMPRESSION: 1.  Status post ultrasound-guided paracentesis. Reference CPT Code: 73954    CT Abdomen Pelvis w/o Contrast  Result Date: 7/31/2025  EXAM: CT ABDOMEN PELVIS W/O CONTRAST LOCATION: [facility] DATE: 7/31/2025 INDICATION: Abdominal distention. COMPARISON: 7/16/2025. TECHNIQUE: CT scan of the abdomen and pelvis was performed without IV contrast. Multiplanar reformats were obtained. Dose reduction techniques were used. CONTRAST: None. FINDINGS: LOWER CHEST: Mild scattered atelectasis in the lung bases. No consolidation or significant pleural effusion. HEPATOBILIARY: Cirrhotic morphology of the liver redemonstrated. A 1 m cyst again seen in the left hepatic lobe. Large volume ascites is increased from prior. Gallbladder is normal on this noncontrast enhanced study. PANCREAS: Normal. SPLEEN: Mildly enlarged measuring 14.7 cm maximally. Capsular calcification along the lateral splenic surface is unchanged. ADRENAL GLANDS: Normal. KIDNEYS/BLADDER: Normal. BOWEL: No inflammatory bowel wall thickening or bowel obstruction. Appendix is normal. No free air. LYMPH NODES: Normal. VASCULATURE: Mild aortoiliac atherosclerotic calcifications. No abdominal aortic aneurysm. Multiple intra-abdominal venous collateral vessels present. PELVIC ORGANS: Normal. MUSCULOSKELETAL: Mild spinal degenerative changes. No suspicious osseous lesions or acute fractures.     IMPRESSION: 1.  Redemonstration of cirrhosis with large volume ascites. 2.  Findings of portal hypertension including mild splenomegaly and intra-abdominal venous collaterals.       I have reviewed the current diagnostic and laboratory  tests.              MELD 3.0: 39 at 8/2/2025  9:39 AM  MELD-Na: 39 at 8/2/2025  9:39 AM  Calculated from:  Serum Creatinine: 4.04 mg/dL (Using max of 3 mg/dL) at 8/2/2025  6:06 AM  Serum Sodium: 119 mmol/L (Using min of 125 mmol/L) at 8/2/2025  9:39 AM  Total Bilirubin: 23.1 mg/dL at 8/2/2025  6:06 AM  Serum Albumin: 2.8 g/dL at 8/2/2025  6:06 AM  INR(ratio): 1.92 at 8/1/2025  6:56 AM  Age at listing (hypothetical): 75 years  Sex: Male at 8/2/2025  9:39 AM      ASSESSMENT  Mr. Chowdhury is a 75-year-old gentleman with alcohol use disorder, recently diagnosed cirrhosis with decompensation evidenced by esophageal varices, ascites, colopathy; also RLS, aortic stenosis, recently diagnosed CHF (EF 50 to 55% on 7/3 ECHO).  This is his third admission in the past month; presented with abdominal distention and shortness of breath.    1.  Cirrhosis, decompensated - SBP  MELD Na and MELD 3.0 are 39 -- suggests poor prognosis.   [facility] DF is 69.  Would typically treat with steroids though have been holding of due to leukocytosis.    Unlikely transplant candidate due to age.  Complicated by:  1. Ascites (see below)  2. Coagulopathy - INR 1.92(gradually increasing)  3. Portal HTN -- eso varices (large). S/P banding 7/3/25. Due for follow-up 8/8/25.  Splenomegaly.   4.  Hyperbilirubinemia - suspect combo of cirrhosis and EtOH hepatitis; however, T bili usually peaks 1-2 weeks after onset of EtOH hepatitis.  Increase in bili likely due to underlying decompensation.  Last EtOH 7/1.  T bili 26.9 on 7/16 versus 28.2 today.   5. S/p single dose of IV Vit K 10 mg - no significant response  6. Not a transplant candidate 2/2 to recalcitrant alcohol use    No evidence of encephalopathy.  However, bowels have been a bit sluggish-- will start lactulose once daily.    2. Ascites  Diuretics on hold, given BRADLEY  Had been on lasix 20mg daily, spironolactone 25mg daily.   Plans are for paracentesis though limit volume due to renal function.     3.   BRADLEY  Creatinine was normal through July 17.  Now 3.7.   Will need further urine studies to assess HRS.  Noted plans for nephrology to see -appreciate their expertise.    4.  Hyponatremia  Fluid overload. Dehydration another factor.  Mental status appears at baseline.     Patient Active Problem List   Diagnosis    GI bleed    Secondary esophageal varices with bleeding (H)    ABLA (acute blood loss anemia)    Alcoholic cirrhosis of liver with ascites (H)    Alcoholism (H)    Lymphedema    Chronic systolic heart failure (H)    Loose stools    Restless legs syndrome (RLS)    Hyperbilirubinemia    Hyponatremia    Jaundice    Ascites due to alcoholic cirrhosis (H)    Leukocytosis, unspecified type    BRADLEY (acute kidney injury)    End stage liver disease (H)    Chronic diastolic heart failure (H)    Portal hypertension (H)    Secondary esophageal varices without bleeding (H)    Metabolic acidemia    Abdominal distension     PLAN  1.  Repeat diagnostic tap tomorrow - lab orders entered,   3.  Repeat Vitamin K 10 mg IV  4.  Continue lactulose and Xifaxan  5.  Continue sobriety  6.  Discussed with nephrology, okay for diagnostic tap tomorrow   7.  Follow-up EGD for esophageal varices is currently scheduled for August 8, 2025 as outpatient.  8.  Daily MELD  9.  Guarded prognosis    Melquiades Tejeda DO  Gastroenterologist  [facility]

## 2025-08-03 ENCOUNTER — APPOINTMENT (OUTPATIENT)
Dept: ULTRASOUND IMAGING | Facility: HOSPITAL | Age: 75
DRG: 432 | End: 2025-08-03
Attending: INTERNAL MEDICINE
Payer: COMMERCIAL

## 2025-08-03 LAB
% LINING CELLS, BODY FLUID: 2 %
ALBUMIN BODY FLUID SOURCE: NORMAL
ALBUMIN FLD-MCNC: 0.9 G/DL
ALBUMIN SERPL BCG-MCNC: 3.5 G/DL (ref 3.5–5.2)
ALP SERPL-CCNC: 129 U/L (ref 40–150)
ALT SERPL W P-5'-P-CCNC: 64 U/L (ref 0–70)
ANION GAP SERPL CALCULATED.3IONS-SCNC: 17 MMOL/L (ref 7–15)
APPEARANCE FLD: ABNORMAL
AST SERPL W P-5'-P-CCNC: 93 U/L (ref 0–45)
BASOPHILS # BLD AUTO: 0 10E3/UL (ref 0–0.2)
BASOPHILS NFR BLD AUTO: 0 %
BILIRUB DIRECT SERPL-MCNC: 15.77 MG/DL (ref 0–0.3)
BILIRUB SERPL-MCNC: 23.6 MG/DL
BUN SERPL-MCNC: 80 MG/DL (ref 8–23)
CALCIUM SERPL-MCNC: 8.5 MG/DL (ref 8.8–10.4)
CHLORIDE SERPL-SCNC: 88 MMOL/L (ref 98–107)
COLOR FLD: YELLOW
CREAT SERPL-MCNC: 3.91 MG/DL (ref 0.67–1.17)
EGFRCR SERPLBLD CKD-EPI 2021: 15 ML/MIN/1.73M2
EOSINOPHIL # BLD AUTO: 0.4 10E3/UL (ref 0–0.7)
EOSINOPHIL NFR BLD AUTO: 4 %
ERYTHROCYTE [DISTWIDTH] IN BLOOD BY AUTOMATED COUNT: 14.1 % (ref 10–15)
GLUCOSE SERPL-MCNC: 122 MG/DL (ref 70–99)
HCO3 SERPL-SCNC: 20 MMOL/L (ref 22–29)
HCT VFR BLD AUTO: 26.3 % (ref 40–53)
HGB BLD-MCNC: 9.6 G/DL (ref 13.3–17.7)
IMM GRANULOCYTES # BLD: 0.2 10E3/UL
IMM GRANULOCYTES NFR BLD: 2 %
INR PPP: 2.01 (ref 0.85–1.15)
LYMPHOCYTES # BLD AUTO: 0.6 10E3/UL (ref 0.8–5.3)
LYMPHOCYTES NFR BLD AUTO: 7 %
LYMPHOCYTES NFR FLD MANUAL: 2 %
MAGNESIUM SERPL-MCNC: 2.2 MG/DL (ref 1.7–2.3)
MCH RBC QN AUTO: 33.9 PG (ref 26.5–33)
MCHC RBC AUTO-ENTMCNC: 36.5 G/DL (ref 31.5–36.5)
MCV RBC AUTO: 93 FL (ref 78–100)
MONOCYTES # BLD AUTO: 1 10E3/UL (ref 0–1.3)
MONOCYTES NFR BLD AUTO: 11 %
MONOS+MACROS NFR FLD MANUAL: 53 %
NEUTROPHILS # BLD AUTO: 6.8 10E3/UL (ref 1.6–8.3)
NEUTROPHILS # FLD: 1789.2 /UL (ref ?–250)
NEUTROPHILS NFR BLD AUTO: 76 %
NEUTS BAND NFR FLD MANUAL: 43 %
NRBC # BLD AUTO: 0 10E3/UL
NRBC BLD AUTO-RTO: 0 /100
PHOSPHATE SERPL-MCNC: 5.2 MG/DL (ref 2.5–4.5)
PLATELET # BLD AUTO: 158 10E3/UL (ref 150–450)
POTASSIUM SERPL-SCNC: 3.8 MMOL/L (ref 3.4–5.3)
PROT FLD-MCNC: 1.3 G/DL
PROT SERPL-MCNC: 5.8 G/DL (ref 6.4–8.3)
PROTEIN BODY FLUID SOURCE: NORMAL
PROTHROMBIN TIME: 22.9 SECONDS (ref 11.8–14.8)
RBC # BLD AUTO: 2.83 10E6/UL (ref 4.4–5.9)
SODIUM SERPL-SCNC: 121 MMOL/L (ref 135–145)
SODIUM SERPL-SCNC: 122 MMOL/L (ref 135–145)
SODIUM SERPL-SCNC: 123 MMOL/L (ref 135–145)
SODIUM SERPL-SCNC: 124 MMOL/L (ref 135–145)
SODIUM SERPL-SCNC: 125 MMOL/L (ref 135–145)
SODIUM SERPL-SCNC: 125 MMOL/L (ref 135–145)
SPECIMEN SOURCE FLD: ABNORMAL
WBC # BLD AUTO: 8.9 10E3/UL (ref 4–11)
WBC # FLD AUTO: 4161 /UL

## 2025-08-03 PROCEDURE — 85004 AUTOMATED DIFF WBC COUNT: CPT | Performed by: STUDENT IN AN ORGANIZED HEALTH CARE EDUCATION/TRAINING PROGRAM

## 2025-08-03 PROCEDURE — 82248 BILIRUBIN DIRECT: CPT | Performed by: STUDENT IN AN ORGANIZED HEALTH CARE EDUCATION/TRAINING PROGRAM

## 2025-08-03 PROCEDURE — 82247 BILIRUBIN TOTAL: CPT | Performed by: STUDENT IN AN ORGANIZED HEALTH CARE EDUCATION/TRAINING PROGRAM

## 2025-08-03 PROCEDURE — 250N000013 HC RX MED GY IP 250 OP 250 PS 637: Performed by: INTERNAL MEDICINE

## 2025-08-03 PROCEDURE — 250N000011 HC RX IP 250 OP 636: Performed by: INTERNAL MEDICINE

## 2025-08-03 PROCEDURE — 0W9G3ZZ DRAINAGE OF PERITONEAL CAVITY, PERCUTANEOUS APPROACH: ICD-10-PCS | Performed by: INTERNAL MEDICINE

## 2025-08-03 PROCEDURE — 120N000001 HC R&B MED SURG/OB

## 2025-08-03 PROCEDURE — P9047 ALBUMIN (HUMAN), 25%, 50ML: HCPCS | Performed by: INTERNAL MEDICINE

## 2025-08-03 PROCEDURE — 89050 BODY FLUID CELL COUNT: CPT | Performed by: INTERNAL MEDICINE

## 2025-08-03 PROCEDURE — 258N000003 HC RX IP 258 OP 636: Performed by: INTERNAL MEDICINE

## 2025-08-03 PROCEDURE — 84295 ASSAY OF SERUM SODIUM: CPT | Performed by: INTERNAL MEDICINE

## 2025-08-03 PROCEDURE — 83735 ASSAY OF MAGNESIUM: CPT | Performed by: STUDENT IN AN ORGANIZED HEALTH CARE EDUCATION/TRAINING PROGRAM

## 2025-08-03 PROCEDURE — 80051 ELECTROLYTE PANEL: CPT | Performed by: INTERNAL MEDICINE

## 2025-08-03 PROCEDURE — 87070 CULTURE OTHR SPECIMN AEROBIC: CPT | Performed by: INTERNAL MEDICINE

## 2025-08-03 PROCEDURE — 84157 ASSAY OF PROTEIN OTHER: CPT | Performed by: INTERNAL MEDICINE

## 2025-08-03 PROCEDURE — 84075 ASSAY ALKALINE PHOSPHATASE: CPT | Performed by: STUDENT IN AN ORGANIZED HEALTH CARE EDUCATION/TRAINING PROGRAM

## 2025-08-03 PROCEDURE — 85610 PROTHROMBIN TIME: CPT | Performed by: INTERNAL MEDICINE

## 2025-08-03 PROCEDURE — 82042 OTHER SOURCE ALBUMIN QUAN EA: CPT | Performed by: INTERNAL MEDICINE

## 2025-08-03 PROCEDURE — 84460 ALANINE AMINO (ALT) (SGPT): CPT | Performed by: STUDENT IN AN ORGANIZED HEALTH CARE EDUCATION/TRAINING PROGRAM

## 2025-08-03 PROCEDURE — 250N000013 HC RX MED GY IP 250 OP 250 PS 637: Performed by: PHYSICIAN ASSISTANT

## 2025-08-03 PROCEDURE — 36415 COLL VENOUS BLD VENIPUNCTURE: CPT | Performed by: INTERNAL MEDICINE

## 2025-08-03 PROCEDURE — 250N000013 HC RX MED GY IP 250 OP 250 PS 637: Performed by: HOSPITALIST

## 2025-08-03 PROCEDURE — 99232 SBSQ HOSP IP/OBS MODERATE 35: CPT | Performed by: INTERNAL MEDICINE

## 2025-08-03 PROCEDURE — 250N000013 HC RX MED GY IP 250 OP 250 PS 637: Performed by: STUDENT IN AN ORGANIZED HEALTH CARE EDUCATION/TRAINING PROGRAM

## 2025-08-03 PROCEDURE — 250N000011 HC RX IP 250 OP 636: Performed by: HOSPITALIST

## 2025-08-03 PROCEDURE — 250N000012 HC RX MED GY IP 250 OP 636 PS 637: Mod: JW,JA | Performed by: INTERNAL MEDICINE

## 2025-08-03 PROCEDURE — 84450 TRANSFERASE (AST) (SGOT): CPT | Performed by: STUDENT IN AN ORGANIZED HEALTH CARE EDUCATION/TRAINING PROGRAM

## 2025-08-03 PROCEDURE — 99233 SBSQ HOSP IP/OBS HIGH 50: CPT | Performed by: STUDENT IN AN ORGANIZED HEALTH CARE EDUCATION/TRAINING PROGRAM

## 2025-08-03 PROCEDURE — 272N000042 US PARACENTESIS WITHOUT ALBUMIN

## 2025-08-03 RX ORDER — CEFTRIAXONE 2 G/1
2 INJECTION, POWDER, FOR SOLUTION INTRAMUSCULAR; INTRAVENOUS EVERY 24 HOURS
Status: COMPLETED | OUTPATIENT
Start: 2025-08-04 | End: 2025-08-07

## 2025-08-03 RX ORDER — BUMETANIDE 0.25 MG/ML
2 INJECTION, SOLUTION INTRAMUSCULAR; INTRAVENOUS 2 TIMES DAILY
Status: DISCONTINUED | OUTPATIENT
Start: 2025-08-03 | End: 2025-08-07

## 2025-08-03 RX ORDER — SPIRONOLACTONE 25 MG/1
25 TABLET ORAL DAILY
Status: DISCONTINUED | OUTPATIENT
Start: 2025-08-03 | End: 2025-08-08

## 2025-08-03 RX ORDER — ALBUMIN (HUMAN) 12.5 G/50ML
12.5 SOLUTION INTRAVENOUS 2 TIMES DAILY
Status: DISCONTINUED | OUTPATIENT
Start: 2025-08-03 | End: 2025-08-05

## 2025-08-03 RX ORDER — ACETAMINOPHEN 325 MG/1
325 TABLET ORAL EVERY 4 HOURS PRN
Status: DISCONTINUED | OUTPATIENT
Start: 2025-08-03 | End: 2025-08-09 | Stop reason: HOSPADM

## 2025-08-03 RX ADMIN — RIFAXIMIN 550 MG: 550 TABLET ORAL at 08:35

## 2025-08-03 RX ADMIN — GABAPENTIN 300 MG: 300 CAPSULE ORAL at 03:45

## 2025-08-03 RX ADMIN — OCTREOTIDE ACETATE 50 MCG/HR: 500 INJECTION, SOLUTION INTRAVENOUS; SUBCUTANEOUS at 13:35

## 2025-08-03 RX ADMIN — RIFAXIMIN 550 MG: 550 TABLET ORAL at 13:35

## 2025-08-03 RX ADMIN — MIDODRINE HYDROCHLORIDE 10 MG: 5 TABLET ORAL at 08:35

## 2025-08-03 RX ADMIN — RIFAXIMIN 550 MG: 550 TABLET ORAL at 22:15

## 2025-08-03 RX ADMIN — BUMETANIDE 4 MG: 0.25 INJECTION INTRAMUSCULAR; INTRAVENOUS at 08:35

## 2025-08-03 RX ADMIN — LACTULOSE SOLUTION USP, 10 G/15 ML 10 G: 10 SOLUTION ORAL; RECTAL at 08:35

## 2025-08-03 RX ADMIN — ALBUMIN HUMAN 25 G: 0.25 SOLUTION INTRAVENOUS at 06:09

## 2025-08-03 RX ADMIN — BUMETANIDE 2 MG: 0.25 INJECTION INTRAMUSCULAR; INTRAVENOUS at 22:09

## 2025-08-03 RX ADMIN — Medication 1 TABLET: at 08:36

## 2025-08-03 RX ADMIN — MIDODRINE HYDROCHLORIDE 10 MG: 5 TABLET ORAL at 11:09

## 2025-08-03 RX ADMIN — MIDODRINE HYDROCHLORIDE 10 MG: 5 TABLET ORAL at 16:08

## 2025-08-03 RX ADMIN — CEFTRIAXONE SODIUM 2 G: 2 INJECTION, POWDER, FOR SOLUTION INTRAMUSCULAR; INTRAVENOUS at 01:07

## 2025-08-03 RX ADMIN — ALBUMIN HUMAN 12.5 G: 0.25 SOLUTION INTRAVENOUS at 22:06

## 2025-08-03 RX ADMIN — SPIRONOLACTONE 25 MG: 25 TABLET, FILM COATED ORAL at 11:07

## 2025-08-03 RX ADMIN — THIAMINE HCL TAB 100 MG 100 MG: 100 TAB at 08:36

## 2025-08-03 ASSESSMENT — ACTIVITIES OF DAILY LIVING (ADL)
ADLS_ACUITY_SCORE: 44
ADLS_ACUITY_SCORE: 41
ADLS_ACUITY_SCORE: 44
ADLS_ACUITY_SCORE: 44
ADLS_ACUITY_SCORE: 41
ADLS_ACUITY_SCORE: 44
ADLS_ACUITY_SCORE: 41
ADLS_ACUITY_SCORE: 44
ADLS_ACUITY_SCORE: 46
ADLS_ACUITY_SCORE: 41
ADLS_ACUITY_SCORE: 44
ADLS_ACUITY_SCORE: 44

## 2025-08-03 NOTE — PROGRESS NOTES
Gastroenterology Progress Note    Consultation Indication:    Subjective:    Sitting up in chair, making considerable amount of urine, s/p diagnostic para today    Objective:  Vital signs in last 24 hours  Temp:  [97.7  F (36.5  C)-98.1  F (36.7  C)] 98.1  F (36.7  C)  Pulse:  [64-75] 72  Resp:  [16-18] 16  BP: ()/(51-55) 109/53  SpO2:  [95 %-96 %] 95 % O2 Device: None (Room air)      Constitutional: healthy, alert, and no distress   Eyes: ++ scleral icterus   Respiratory: no conversational dyspnea  Abdomen: distended but non-tender abdomen  Neuro: moves all extremities spontaneously   Skin: appears grossly normal, no jaundice  Joint/Extremities: visible extremities appeared grossly normal      LABORATORY    ELECTROLYTE PANEL   Recent Labs   Lab 08/03/25  1408 08/03/25  0946 08/03/25  0636 08/02/25  1838 08/02/25  1510 08/02/25  0939 08/02/25  0606 08/01/25  1419 08/01/25  0656   * 124* 125*  125*   < > 121*  121*   < > 118*   < > 117*   POTASSIUM  --   --  3.8  --  3.7  --  4.0  --  4.7   CHLORIDE  --   --  88*  --  88*  --  85*  --  86*   CO2  --   --  20*  --  17*  --  18*  --  15*   GLC  --   --  122*  --   --   --  127*  --  158*   CR  --   --  3.91*  --   --   --  4.04*  --  3.77*   BUN  --   --  80.0*  --   --   --  79.2*  --  76.8*    < > = values in this interval not displayed.      HEMATOLOGY PANEL   Recent Labs   Lab 08/03/25  0636 08/02/25  0606 08/01/25  0656 07/31/25  2215   HGB 9.6* 10.4* 11.6* 12.1*   MCV 93 93 95 95   WBC 8.9 11.7* 13.3* 15.3*    169 240 277   INR 2.01*  --  1.92* 1.91*      LIVER AND PANCREAS PANEL   Recent Labs   Lab 08/03/25  0636 08/02/25  0606 08/01/25  0656 07/31/25  2215   AST 93* 92* 131* 135*   ALT 64 65 83* 84*   ALKPHOS 129 124 155* 166*   BILITOTAL 23.6* 23.1* 28.2* 26.5*   LIPASE  --   --   --  212*     IMAGING STUDIES    US Paracentesis with Albumin  Result Date: 8/1/2025  EXAM: 1. PARACENTESIS 2. ULTRASOUND GUIDANCE LOCATION: [facility] DATE: 8/1/2025 INDICATION: Ascites. PROCEDURE: Informed consent obtained. Time out performed. The abdomen was prepped and draped in a sterile fashion. 5 mL of 1% lidocaine was infused into local soft tissues. A 5 Panamanian catheter system was introduced into the abdominal ascites under ultrasound guidance. 6.4 liters of hazy bhargavi-colored fluid were removed and sent to lab if requested. Patient tolerated procedure well. Ultrasound imaging was obtained and placed in the patient's permanent medical record.     IMPRESSION: 1.  Status post ultrasound-guided paracentesis. Reference CPT Code: 39597    CT Abdomen Pelvis w/o Contrast  Result Date: 7/31/2025  EXAM: CT ABDOMEN PELVIS W/O CONTRAST LOCATION: [facility] DATE: 7/31/2025 INDICATION: Abdominal distention. COMPARISON: 7/16/2025. TECHNIQUE: CT scan of the abdomen and pelvis was performed without IV contrast. Multiplanar reformats were obtained. Dose reduction techniques were used. CONTRAST: None. FINDINGS: LOWER CHEST: Mild scattered atelectasis in the lung bases. No consolidation or significant pleural effusion. HEPATOBILIARY: Cirrhotic morphology of the liver redemonstrated. A 1 m cyst again seen in the left hepatic lobe. Large volume ascites is increased from prior. Gallbladder is normal on this noncontrast enhanced study. PANCREAS: Normal. SPLEEN: Mildly enlarged measuring 14.7 cm maximally. Capsular calcification along the lateral splenic surface is unchanged. ADRENAL GLANDS: Normal. KIDNEYS/BLADDER: Normal. BOWEL: No inflammatory bowel wall thickening or bowel obstruction. Appendix is normal. No free air. LYMPH NODES: Normal. VASCULATURE: Mild aortoiliac atherosclerotic calcifications. No abdominal aortic aneurysm. Multiple intra-abdominal venous collateral vessels present. PELVIC ORGANS: Normal. MUSCULOSKELETAL: Mild spinal degenerative changes. No suspicious osseous lesions or acute fractures.     IMPRESSION: 1.   Redemonstration of cirrhosis with large volume ascites. 2.  Findings of portal hypertension including mild splenomegaly and intra-abdominal venous collaterals.       I have reviewed the current diagnostic and laboratory tests.              MELD 3.0: 40 at 8/3/2025  2:08 PM  MELD-Na: 39 at 8/3/2025  2:08 PM  Calculated from:  Serum Creatinine: 3.91 mg/dL (Using max of 3 mg/dL) at 8/3/2025  6:36 AM  Serum Sodium: 121 mmol/L (Using min of 125 mmol/L) at 8/3/2025  2:08 PM  Total Bilirubin: 23.6 mg/dL at 8/3/2025  6:36 AM  Serum Albumin: 3.5 g/dL at 8/3/2025  6:36 AM  INR(ratio): 2.01 at 8/3/2025  6:36 AM  Age at listing (hypothetical): 75 years  Sex: Male at 8/3/2025  2:08 PM      ASSESSMENT  Mr. Chowdhury is a 75-year-old gentleman with alcohol use disorder, recently diagnosed cirrhosis with decompensation evidenced by esophageal varices, ascites, colopathy; also RLS, aortic stenosis, recently diagnosed CHF (EF 50 to 55% on 7/3 ECHO).  This is his third admission in the past month; presented with abdominal distention and shortness of breath.    1.  Cirrhosis, decompensated - SBP  MELD Na and MELD 3.0 are 39 -- suggests poor prognosis.   [facility] DF is 69.  Would typically treat with steroids though have been holding of due to leukocytosis.    Unlikely transplant candidate due to age.  Complicated by:  1. Ascites (see below)  2. Coagulopathy - INR 1.92(gradually increasing)  3. Portal HTN -- eso varices (large). S/P banding 7/3/25. Due for follow-up 8/8/25.  Splenomegaly.   4.  Hyperbilirubinemia - suspect combo of cirrhosis and EtOH hepatitis; however, T bili usually peaks 1-2 weeks after onset of EtOH hepatitis.  Increase in bili likely due to underlying decompensation.  Last EtOH 7/1.  T bili 26.9 on 7/16 versus 28.2 today.   5. S/p single dose of IV Vit K 10 mg - no significant response  6. Not a transplant candidate 2/2 to recalcitrant alcohol use  7. INR did not respond to IV Vit K 10 mg x2 doses  8. Repeat Para on  8/3/25 shows considerable improvement in neutrophil count, patient is responding to abx     No evidence of encephalopathy.  However, bowels have been a bit sluggish-- will start lactulose once daily.    2. Ascites  Diuretics on hold, given BRADLEY  Had been on lasix 20mg daily, spironolactone 25mg daily.   Plans are for paracentesis though limit volume due to renal function.     3.  BRADLEY  Creatinine was normal through July 17.  Now 3.7.   Will need further urine studies to assess HRS.  Noted plans for nephrology to see -appreciate their expertise.    4.  Hyponatremia  Fluid overload. Dehydration another factor.  Mental status appears at baseline.     Patient Active Problem List   Diagnosis    GI bleed    Secondary esophageal varices with bleeding (H)    ABLA (acute blood loss anemia)    Alcoholic cirrhosis of liver with ascites (H)    Alcoholism (H)    Lymphedema    Chronic systolic heart failure (H)    Loose stools    Restless legs syndrome (RLS)    Hyperbilirubinemia    Hyponatremia    Jaundice    Ascites due to alcoholic cirrhosis (H)    Leukocytosis, unspecified type    BRADLEY (acute kidney injury)    End stage liver disease (H)    Chronic diastolic heart failure (H)    Portal hypertension (H)    Secondary esophageal varices without bleeding (H)    Metabolic acidemia    Abdominal distension     PLAN  1.  Continue lactulose and Xifaxan - have 3 BMs daily  2.  Follow-up EGD for esophageal varices is currently scheduled for August 8, 2025 as outpatient.  3.  Daily MELD  4.  Guarded prognosis  5.  Complete 7 day (end 8/7/25) course of abx followed by cipro 500 mg once daily for SBP ppx, patient's neutrophil count has improved and patient is responding to abx based on diagnostic para today  6. Unfortunately, patient's INR did not respond to IV Vit K 10mg x2 doses  7. Nighttime snack    Melquiades Tejeda DO  Gastroenterologist  [facility]

## 2025-08-03 NOTE — PLAN OF CARE
Goal Outcome Evaluation:                          Problem: Liver Failure  Goal: Optimal Coping with Liver Failure  Outcome: Progressing  Goal: Absence of Bleeding  Outcome: Progressing  Goal: Fluid and Electrolyte Balance  Outcome: Progressing  Intervention: Monitor and Manage Fluid and Electrolyte Balance  Recent Flowsheet Documentation  Taken 8/3/2025 0900 by Venita Muse RN  Fluid/Electrolyte Management: fluids restricted  Goal: Optimal Gastrointestinal Function  Outcome: Progressing  Intervention: Monitor and Support Gastrointestinal Function  Recent Flowsheet Documentation  Taken 8/3/2025 0900 by Venita Muse RN  Fluid/Electrolyte Management: fluids restricted  Goal: Blood Glucose Level Within Target Range  Outcome: Progressing  Goal: Hemodynamic Stability  Outcome: Progressing  Goal: Absence of Infection Signs and Symptoms  Outcome: Progressing  Goal: Optimal Neurologic Function  Outcome: Progressing  Intervention: Monitor and Optimize Neurologic Status  Recent Flowsheet Documentation  Taken 8/3/2025 0900 by Venita Muse RN  Head of Bed (HOB) Positioning: HOB at 20-30 degrees  Goal: Improved Oral Intake  Outcome: Progressing  Goal: Optimal Pain Control, Comfort and Function  Outcome: Progressing  Goal: Effective Oxygenation and Ventilation  Outcome: Progressing  Intervention: Promote Airway Secretion Clearance  Recent Flowsheet Documentation  Taken 8/3/2025 0900 by Venita Muse RN  Activity Management:   activity adjusted per tolerance   ambulated to bathroom   up in chair  Intervention: Optimize Oxygenation and Ventilation  Recent Flowsheet Documentation  Taken 8/3/2025 0900 by Venita Muse RN  Head of Bed (HOB) Positioning: HOB at 20-30 degrees     Problem: Acute Kidney Injury/Impairment  Goal: Fluid and Electrolyte Balance  Outcome: Progressing  Intervention: Monitor and Manage Fluid and Electrolyte Balance  Recent Flowsheet Documentation  Taken 8/3/2025 0900 by Micha  Venita AGUSTIN, RN  Fluid/Electrolyte Management: fluids restricted  Goal: Improved Oral Intake  Outcome: Progressing  Goal: Effective Renal Function  Outcome: Progressing  Intervention: Monitor and Support Renal Function  Recent Flowsheet Documentation  Taken 8/3/2025 0900 by Venita Muse, RN  Medication Review/Management: medications reviewed   Pt had paracentesis this am, tolerated well. Site CDI. Eating snacks at bedside, not full meals. Erazo intact. Octreotide infusing @ 50mcg/hr.

## 2025-08-03 NOTE — PLAN OF CARE
"  Problem: Adult Inpatient Plan of Care  Goal: Plan of Care Review  Description: The Plan of Care Review/Shift note should be completed every shift.  The Outcome Evaluation is a brief statement about your assessment that the patient is improving, declining, or no change.  This information will be displayed automatically on your shift  note.  Outcome: Progressing     Problem: Pain Acute  Goal: Optimal Pain Control and Function  Outcome: Progressing  Intervention: Prevent or Manage Pain  Recent Flowsheet Documentation  Taken 8/3/2025 0000 by Jg Brooks RN  Medication Review/Management: medications reviewed  Taken 8/2/2025 1930 by Jg Brooks RN  Medication Review/Management: medications reviewed     Problem: Gas Exchange Impaired  Goal: Optimal Gas Exchange  Outcome: Progressing  Intervention: Optimize Oxygenation and Ventilation  Recent Flowsheet Documentation  Taken 8/3/2025 0000 by Jg Brooks RN  Head of Bed (HOB) Positioning: HOB at 20-30 degrees  Taken 8/2/2025 1930 by Jg Brooks RN  Head of Bed (HOB) Positioning: HOB at 20-30 degrees     Problem: Liver Failure  Goal: Blood Glucose Level Within Target Range  Outcome: Progressing     Problem: Liver Failure  Goal: Absence of Infection Signs and Symptoms  Outcome: Progressing     Problem: Comorbidity Management  Goal: Maintenance of Heart Failure Symptom Control  Outcome: Progressing  Intervention: Maintain Heart Failure Management  Recent Flowsheet Documentation  Taken 8/3/2025 0000 by Jg Brooks RN  Medication Review/Management: medications reviewed  Taken 8/2/2025 1930 by Jg Brooks RN  Medication Review/Management: medications reviewed    Goal Outcome Evaluation:    Patient AOX4. Able to make needs known. VSS. Denies SOB and nausea. Patient reported bilateral calf \"cramping\" and pain overnight; administered PRN gabapentin with positive results and reduction in pain; MD also notified secondary to concerns of possible " DVT and advised to continue to monitor. Erazo in place with adequate urine output. Tele - Sinus with 1st degree block. Sodium levels continue to trend upward; MD notified per order and aware. Sleeping between cares.

## 2025-08-03 NOTE — PROGRESS NOTES
Care Management Follow Up    Length of Stay (days): 3    Expected Discharge Date: 08/06/2025     Concerns to be Addressed:    discharge planing   Patient plan of care discussed at interdisciplinary rounds: Yes    Anticipated Discharge Disposition:  TBD      Anticipated Discharge Services:  TBD  Anticipated Discharge DME:  TBD    Private pay costs discussed: transportation costs    Discussed  Partnership in Safe Discharge Planning  document with patient/family: No     Handoff Completed: No, handoff not indicated or clinically appropriate    Additional Information:  Per provider not much improvement. Patient might be ready by mid of next week.  Possible Palliative Care consult for [facility].    Next Steps: CM will continue to follow medical progression of care, discharge recommendations, and assist with final discharge plan.  PT/OT recommendations pending.    Gricelda Bryanp, LSW

## 2025-08-03 NOTE — PROGRESS NOTES
[facility]    Medicine Progress Note - Hospitalist Service    Date of Admission:  7/31/2025    Assessment & Plan      Malcom Chowdhury is a 75 year old male admitted on 7/31/2025.  He has history of alcoholism, mood disorder, recently hospitalized 7/2-7/6, 7/16-7/17 found to have new diagnosis of bleeding esophageal varices and alcoholic cirrhosis, was on lasix /aldactone now with increasing shortness of breath and progressive abdominal distension. Pt has history of needing paracentesis with last reported tap 7/16-Paracentesis was without evidence of SBP. Pt also is markedly jaundiced. Pt reports abdominal pain 3/10.  He reports that this has been progressively worsening and feels similar to what he experienced prior to previous paracentesis.  He reports that he has an EGD scheduled for 8/8 but he does not have any repeat paracentesis scheduled.  He has not had much abdominal pain but has had some diarrhea.  He denies chest pain, fever, nausea, vomiting, or urinary symptoms.  He reports that he has been taking all of his medications as prescribed. Admitted for severe hyponatremia, BRADLEY, severe sepsis 2/2 sbp  -- S/p paracentesis on 08/01 and 08/03.   -- On ceftriaxone for SBP  -- Cultures NGTD  -- Nephrology following for BRADLEY and hyponatremia  -- GI following  -- Dispo: likely 2-4 days of stay      #Hyponatremia, severe  #AGMA  #BRADLEY-prerenal, ?Hepatorenal syndrome  -- Suspect hyponatremia to be multifactorial: dilutional, dehydration, Pseudohyponatremia 2/2 hyperbilirubinemia.   -- No AMS  -- CT-a/p: cirrhosis, large volume ascites, portal hypertension, mild splenomegaly and intraabdominal venous collaterals. Normal kidney and bladder.  -- Monitor BMP trend  -- Avoid nephrotoxic meds  -- Nephrology consult appreciated: Albumin gtt q12, octreotide, midodrine, bumex q12h, spiranolactone  -- S/p bicarb- sodium q4 hrs. Na: 114-->117-->119--->124  -- Check serum osm, lipid panel- for  pseudohyponatremia    #Severe sepsis with marko, lactic acidosis  #SBP  #Lactic acidosis   -- SIRS (tachypnea, leukocytosis)   -- Bcx NGTD  -- IV antibiotics- empirically rocephin to cover broad-spectrum coverage for gram-positive negatives and anaerobes while  culture and sensitivities are pending  -- S/p Paracentesis with albumin on 08/01. 6.4L of hazy bhargavi colored fluid removed. Ascitic fluid analysis consistent with SBP. Culture pending  -- S/p Paracentesis on 08/03. 1.7 L of bhargavi colored fluid removed. Ascitic fluid analysis sent. Cell count went down significantly.    #Decompensated  alcoholic cirrhosis of liver with ascites   #Hyperbilirubinemia, Cholestatic LFTs  #Alcoholic hepatitis  -- Presented with severe jaundice, total bili up to 26-->28.2  MELD 3.0: 40 at 8/3/2025  9:46 AM  MELD-Na: 39 at 8/3/2025  9:46 AM  Calculated from:  Serum Creatinine: 3.91 mg/dL (Using max of 3 mg/dL) at 8/3/2025  6:36 AM  Serum Sodium: 124 mmol/L (Using min of 125 mmol/L) at 8/3/2025  9:46 AM  Total Bilirubin: 23.6 mg/dL at 8/3/2025  6:36 AM  Serum Albumin: 3.5 g/dL at 8/3/2025  6:36 AM  INR(ratio): 2.01 at 8/3/2025  6:36 AM  Age at listing (hypothetical): 75 years  Sex: Male at 8/3/2025  9:46 AM     -- Miki DF: 60's. Holding steroids due to SBP-defer to GI  -- Prognosis is worrisome  -- GI consult appreciated: Vitamin k 10mg iv. Begin lactulose once daily. Follo wup EGD for esophageal varices on 08/08/25    #Portal hypertension  #Large volume ascites due to above  -- Recently started on Lasix and spironolactone to control ascites   -- Currently on Bumex and sprinolactone (started on 08/03)   -- Paracentesis asa above     #Esophageal varices, recent banding 7/3  -- No signs of ongoing bleeding  -- Hgb stable  than at recent discharge  -- Scheduled for followup EGD 8/8     #Alcoholism  -- last EtOH use ~7/1 denies any active drinking  -- States sobriety going well. He had one chem dep counseling session but did not like his  counselor.    #RLS  -- Started on gabapentin during last hospital stay with much benefit  PRN dosing     #Peripheral edema likely due to anasarca and liver cirrhosis and hyperammonemia  -- Continue his home compression stockings    Chronic anemia, mild  -- Monitor. No e/o bleeding       Chronic stable issues:     # Recent diagnosis of HFpEF  -- Recent Echocardiogram noting a mildly reduced LVEF 50 to 55%, with no prior echocardiogram available for review. Elevated LVFP  -- Suspect possibly myocarditis due to alcoholism.   -- Recommend follow up TTE in 1-2 months while sober. If remains abnormal, needs to see cards for evaluation    #Heart murmur  #Mild-moderate aortic stenosis  -followup echo as above     # 9mm liver nodule  -- MRI showed 9 mm hyperenhancing nodule in segment 4A of the liver  -- Radiologist recommends follow-up MRI in 3 to 6 months              Diet: 2 Gram Sodium Diet  Snacks/Supplements Adult: Ensure Plus High Protein; Between Meals  Fluid restriction 1200 ML FLUID  Snacks/Supplements Adult: Special [employer] Bar; Between Meals    DVT Prophylaxis: Pneumatic Compression Devices  Erazo Catheter: PRESENT, indication: Other (Comment) (Accurate output measurement)  Lines: None     Cardiac Monitoring: ACTIVE order. Indication: QTc prolonging medication (48 hours)  Code Status: Full Code      Clinically Significant Risk Factors         # Hyponatremia: Lowest Na = 115 mmol/L in last 2 days, will monitor as appropriate  # Hypochloremia: Lowest Cl = 85 mmol/L in last 2 days, will monitor as appropriate      # Hypoalbuminemia: Lowest albumin = 2.5 g/dL at 7/31/2025 10:15 PM, will monitor as appropriate    # Coagulation Defect: INR = 2.01 (Ref range: 0.85 - 1.15) and/or PTT = 42 Seconds (Ref range: 22 - 38 Seconds), will monitor for bleeding   # Acute Kidney Injury, unspecified: based on a >150% or 0.3 mg/dL increase in last creatinine compared to past 90 day average, will monitor renal function              # Severe  Malnutrition: based on nutrition assessment and treatment provided per dietitian's recommendations., PRESENT ON ADMISSION     # Financial/Environmental Concerns:           Social Drivers of Health    Tobacco Use: Unknown (7/16/2025)    Patient History     Smoking Tobacco Use: Never     Smokeless Tobacco Use: Unknown          Disposition Plan     Medically Ready for Discharge: Anticipated in 2-4 Days             Elaine Mueller MD  Hospitalist Service  [facility]  Securely message with [employer] (more info)  Text page via [employer] Paging/Directory   ______________________________________________________________________    Interval History   Patient is seen and examined at bedside.   Feeling slightly better today.  Plan of care discussed with patient. All questions answered. Pt verbalized understanding.     Physical Exam   Vital Signs: Temp: 98.1  F (36.7  C) Temp src: Oral BP: 109/53 Pulse: 72   Resp: 16 SpO2: 95 % O2 Device: None (Room air)    Weight: 233 lbs 11 oz    GEN: Alert and oriented. Not in acute distress.  HEENT: Atraumatic, mucous membrane- moist and pink. Icteric sclerae.  Chest: Decreased bilateral air entry basally.  CVS: S1S2 regular.   Abdomen: Distended, non-tender. No guarding or rigidity. Bowel sounds active.   Extremities: b/l LE edmea. Anasarca  CNS: No involuntary movements.  Skin: no cyanosis or clubbing.  Jaundice    Medical Decision Making       52 MINUTES SPENT BY ME on the date of service doing chart review, history, exam, documentation & further activities per the note.      Data

## 2025-08-03 NOTE — PROGRESS NOTES
RENAL NOTE    REQUESTING PHYSICIAN: Dr Mueller     REASON FOR CONSULT: BRADLEY      ASSESSMENT/PLAN:  ESLD - acute decompensation with SBP  Chronic due to ETOH, reports abstinent since July 1, not candidate for Tx eval  Severe portal HTN, ascites and varices/ coagulopathy   Bili better  No severe encephalopathy or other new complications.     SBP on rocephin, bcx and ascites cx neg so far. Repeat Diag para today to ck cell ct.     BRADLEY, baseline normal renal fxn, bland UA last month and previously normal renal anatomy on imaging. Creat 0.8 7/17/25.   Non oliguric BRADLEY, severe volume overload, soft BP but no overt symptomatic hypotension, somewhat tense ascites.   Suspect HRS BRADLEY but also acute infx / SBP, tense ascites and HFmrHF contributing hemodynamic factors.   (UA bland, FENA and Kristie low c/w HRS BRADLEY/ prerenal physiology (CRS/ infx))  Midodrine and octreotide.  Creatinine better, excellent UO and diuresing.   Cont midodrine and octreotide  Reduce alb and IV diuretic, resume spironolactone today.   ICU for vasopressor if worse hypotesion/ oliguria.     Severe hyponatremia, mild in past and now severe due to ADH activation and BRADLEY. Trending better with diuresis / alb at appropriate rate.     Lactic acidosis, s/p IV bicarb, CO2 improved      Soft BP will started midodrine now 10 mg tid    HFmrEF, TR MR and mild AoS and diastolic dysfxn, suspect cirrhotic CM    Anemia mild, plt normal, hgb trending lower,  no reported GI bld sx.     Coagulopathy INR 1.9    Ascites s/p LVP 6 liters    D/w pt, questions answered.  Will follow.   Prognosis guarded.     Yamilet De Jesus MD  [facility]  [phone]      HPI: 76 yo man admitted for 3rd time this month with complications of recent dx of alcoholic liver dz/ cirrhosis and ascites and varices. 3 liter tap on 7/16  and started diuretics, planned to see GI in Sept.   Unfortunately c/o worse weakness, abd distension and SOB.  Little appetite, no n/v, scant  "BRB with stooling ?from straining, no other gross GI bleed.   Urine \"dark\" no dysuria but \"void every time I stool\" and feels he empties.   No nsaids or OTC meds.  No ETOH since July 1.   No orthostatic sx.     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  No confusion    REVIEW OF SYSTEMS:  COMPLETE REVIEW OF SYSTEMS: as above or was negative.     Past Medical History:   Diagnosis Date    Alcoholic cirrhosis of liver with ascites (H)     Esophageal varices (H)     Peripheral edema     Restless legs syndrome (RLS)        @[facility]@      ALLERGIES/SENSITIVITIES:  Allergies   Allergen Reactions    Penicillins Unknown    Sulfa Antibiotics Unknown     @SOCR(SUB)@  [unfilled]     PHYSICAL EXAM:  Physical Exam   Temp: 98.1  F (36.7  C) Temp src: Oral BP: 100/53 Pulse: 68   Resp: 16 SpO2: 95 % O2 Device: None (Room air)    Vitals:    08/01/25 0634 08/01/25 1609 08/02/25 0552   Weight: 101.6 kg (223 lb 14.4 oz) 108.6 kg (239 lb 6.4 oz) 106 kg (233 lb 11 oz)     Vital Signs with Ranges  Temp:  [97.6  F (36.4  C)-98.1  F (36.7  C)] 98.1  F (36.7  C)  Pulse:  [64-75] 68  Resp:  [16-18] 16  BP: ()/(51-55) 100/53  SpO2:  [95 %-96 %] 95 %  I/O last 3 completed shifts:  In: 810 [P.O.:810]  Out: 4200 [Urine:4200]    @TMAXR(24)@    Patient Vitals for the past 72 hrs:   Weight   08/02/25 0552 106 kg (233 lb 11 oz)   08/01/25 1609 108.6 kg (239 lb 6.4 oz)   08/01/25 0634 101.6 kg (223 lb 14.4 oz)   [unfilled]    General - A & O x 3, NAD jaundice   HEENT - +icteric sclerae   Neck supple    Respiratory - Lungs CTA bilat better aeration at bases.   Cardiovascular - AP RRR with murmur all precordium  Abdomen somewhat less firm and NT, decreased abd wall pitting.   Extremities - well perfused, severe 2+ pitting now into thighs, minimal sacral pitting.   Integumentary - intact, good turgor, no rash/lesions + less jaundice   Neurologic - " grossly intact no myoclonus  Psych:  Judgement intact, affect WNL  :  madison with bhargavi urine    Laboratory:     Recent Labs   Lab 08/03/25  0636 08/02/25  0606 08/01/25  0656 07/31/25 2215   WBC 8.9 11.7* 13.3* 15.3*   RBC 2.83* 3.06* 3.44* 3.58*   HGB 9.6* 10.4* 11.6* 12.1*   HCT 26.3* 28.4* 32.7* 33.9*    169 240 277       Basic Metabolic Panel:  Recent Labs   Lab 08/03/25  0946 08/03/25  0636 08/03/25  0152 08/02/25  2210 08/02/25  1838 08/02/25  1510 08/02/25  0939 08/02/25  0606 08/01/25  1419 08/01/25  0656 08/01/25 0152 07/31/25 2215   * 125*  125* 122* 120* 120* 121*  121*   < > 118*   < > 117*   < > 114*   POTASSIUM  --  3.8  --   --   --  3.7  --  4.0  --  4.7  --  5.3   CHLORIDE  --  88*  --   --   --  88*  --  85*  --  86*  --  86*   CO2  --  20*  --   --   --  17*  --  18*  --  15*  --  14*   BUN  --  80.0*  --   --   --   --   --  79.2*  --  76.8*  --  76.1*   CR  --  3.91*  --   --   --   --   --  4.04*  --  3.77*  --  3.89*   GLC  --  122*  --   --   --   --   --  127*  --  158*  --  190*   ANDRES  --  8.5*  --   --   --   --   --  8.1*  --  8.5*  --  8.4*    < > = values in this interval not displayed.       INR  Recent Labs   Lab 08/03/25  0636 08/01/25  0656 07/31/25 2215   INR 2.01* 1.92* 1.91*       Recent Labs   Lab Test 08/01/25  0656 07/31/25  2215   POTASSIUM 4.7 5.3   CHLORIDE 86* 86*   BUN 76.8* 76.1*      Recent Labs   Lab Test 08/01/25  0656 07/31/25  2215 07/03/25  0635 07/02/25  1759   ALBUMIN 2.7* 2.5*   < >  --    BILITOTAL 28.2* 26.5*   < >  --    ALT 83* 84*   < >  --    * 135*   < >  --    PROTEIN  --   --   --  Negative    < > = values in this interval not displayed.     FENA 0.8% Kristie 20 Uosm 338  Personally reviewed today's laboratory studies      Thank you for involving us in the care of this patient. We will continue to follow along with you.      Yamilet De Jesus MD   [facility]  [phone]

## 2025-08-04 LAB
ANION GAP SERPL CALCULATED.3IONS-SCNC: 15 MMOL/L (ref 7–15)
BUN SERPL-MCNC: 79.6 MG/DL (ref 8–23)
CALCIUM SERPL-MCNC: 9 MG/DL (ref 8.8–10.4)
CHLORIDE SERPL-SCNC: 89 MMOL/L (ref 98–107)
CREAT SERPL-MCNC: 3.67 MG/DL (ref 0.67–1.17)
EGFRCR SERPLBLD CKD-EPI 2021: 17 ML/MIN/1.73M2
GLUCOSE BLDC GLUCOMTR-MCNC: 136 MG/DL (ref 70–99)
GLUCOSE SERPL-MCNC: 162 MG/DL (ref 70–99)
HCO3 SERPL-SCNC: 22 MMOL/L (ref 22–29)
HOLD SPECIMEN: NORMAL
LYMPHOCYTES NFR FLD MANUAL: 0 %
MAGNESIUM SERPL-MCNC: 2.2 MG/DL (ref 1.7–2.3)
MONOS+MACROS NFR FLD MANUAL: 9 %
NEUTROPHILS # FLD: ABNORMAL /UL (ref ?–250)
NEUTS BAND NFR FLD MANUAL: 90 %
OTHER CELLS FLD MANUAL: 1 %
PATH REV: ABNORMAL
POTASSIUM SERPL-SCNC: 3.2 MMOL/L (ref 3.4–5.3)
POTASSIUM SERPL-SCNC: 3.3 MMOL/L (ref 3.4–5.3)
SODIUM SERPL-SCNC: 126 MMOL/L (ref 135–145)
SODIUM SERPL-SCNC: 126 MMOL/L (ref 135–145)
SODIUM SERPL-SCNC: 127 MMOL/L (ref 135–145)
SODIUM SERPL-SCNC: 127 MMOL/L (ref 135–145)

## 2025-08-04 PROCEDURE — 250N000013 HC RX MED GY IP 250 OP 250 PS 637: Performed by: HOSPITALIST

## 2025-08-04 PROCEDURE — 84295 ASSAY OF SERUM SODIUM: CPT | Performed by: INTERNAL MEDICINE

## 2025-08-04 PROCEDURE — 250N000011 HC RX IP 250 OP 636: Mod: JZ | Performed by: INTERNAL MEDICINE

## 2025-08-04 PROCEDURE — 99232 SBSQ HOSP IP/OBS MODERATE 35: CPT | Performed by: INTERNAL MEDICINE

## 2025-08-04 PROCEDURE — 250N000013 HC RX MED GY IP 250 OP 250 PS 637: Performed by: INTERNAL MEDICINE

## 2025-08-04 PROCEDURE — P9047 ALBUMIN (HUMAN), 25%, 50ML: HCPCS | Performed by: INTERNAL MEDICINE

## 2025-08-04 PROCEDURE — 84132 ASSAY OF SERUM POTASSIUM: CPT | Performed by: INTERNAL MEDICINE

## 2025-08-04 PROCEDURE — 36415 COLL VENOUS BLD VENIPUNCTURE: CPT | Performed by: INTERNAL MEDICINE

## 2025-08-04 PROCEDURE — 83735 ASSAY OF MAGNESIUM: CPT | Performed by: STUDENT IN AN ORGANIZED HEALTH CARE EDUCATION/TRAINING PROGRAM

## 2025-08-04 PROCEDURE — 250N000013 HC RX MED GY IP 250 OP 250 PS 637: Performed by: PHYSICIAN ASSISTANT

## 2025-08-04 PROCEDURE — 82565 ASSAY OF CREATININE: CPT | Performed by: INTERNAL MEDICINE

## 2025-08-04 PROCEDURE — 120N000001 HC R&B MED SURG/OB

## 2025-08-04 PROCEDURE — 99233 SBSQ HOSP IP/OBS HIGH 50: CPT | Performed by: INTERNAL MEDICINE

## 2025-08-04 PROCEDURE — 250N000011 HC RX IP 250 OP 636: Performed by: INTERNAL MEDICINE

## 2025-08-04 RX ORDER — POTASSIUM CHLORIDE 1500 MG/1
20 TABLET, EXTENDED RELEASE ORAL ONCE
Status: COMPLETED | OUTPATIENT
Start: 2025-08-04 | End: 2025-08-04

## 2025-08-04 RX ADMIN — MIDODRINE HYDROCHLORIDE 10 MG: 5 TABLET ORAL at 13:15

## 2025-08-04 RX ADMIN — CEFTRIAXONE SODIUM 2 G: 2 INJECTION, POWDER, FOR SOLUTION INTRAMUSCULAR; INTRAVENOUS at 00:51

## 2025-08-04 RX ADMIN — MIDODRINE HYDROCHLORIDE 10 MG: 5 TABLET ORAL at 16:32

## 2025-08-04 RX ADMIN — LACTULOSE SOLUTION USP, 10 G/15 ML 10 G: 10 SOLUTION ORAL; RECTAL at 08:20

## 2025-08-04 RX ADMIN — POTASSIUM CHLORIDE 20 MEQ: 1500 TABLET, EXTENDED RELEASE ORAL at 18:16

## 2025-08-04 RX ADMIN — Medication 1 TABLET: at 08:21

## 2025-08-04 RX ADMIN — THIAMINE HCL TAB 100 MG 100 MG: 100 TAB at 08:21

## 2025-08-04 RX ADMIN — RIFAXIMIN 550 MG: 550 TABLET ORAL at 20:59

## 2025-08-04 RX ADMIN — RIFAXIMIN 550 MG: 550 TABLET ORAL at 08:20

## 2025-08-04 RX ADMIN — BUMETANIDE 2 MG: 0.25 INJECTION INTRAMUSCULAR; INTRAVENOUS at 08:20

## 2025-08-04 RX ADMIN — BUMETANIDE 2 MG: 0.25 INJECTION INTRAMUSCULAR; INTRAVENOUS at 20:59

## 2025-08-04 RX ADMIN — SPIRONOLACTONE 25 MG: 25 TABLET, FILM COATED ORAL at 08:21

## 2025-08-04 RX ADMIN — RIFAXIMIN 550 MG: 550 TABLET ORAL at 13:16

## 2025-08-04 RX ADMIN — ALBUMIN HUMAN 12.5 G: 0.25 SOLUTION INTRAVENOUS at 08:19

## 2025-08-04 RX ADMIN — MIDODRINE HYDROCHLORIDE 10 MG: 5 TABLET ORAL at 08:21

## 2025-08-04 RX ADMIN — ALBUMIN HUMAN 12.5 G: 0.25 SOLUTION INTRAVENOUS at 20:59

## 2025-08-04 ASSESSMENT — ACTIVITIES OF DAILY LIVING (ADL)
ADLS_ACUITY_SCORE: 43
ADLS_ACUITY_SCORE: 46
ADLS_ACUITY_SCORE: 42
ADLS_ACUITY_SCORE: 43
ADLS_ACUITY_SCORE: 46
ADLS_ACUITY_SCORE: 43
ADLS_ACUITY_SCORE: 46

## 2025-08-04 NOTE — PROGRESS NOTES
RENAL PROGRESS NOTE    CC:  BRADLEY, ESLD    ASSESSMENT & PLAN:   BRADLEY - baseline normal renal fxn, bland UA last month and previously normal renal anatomy on imaging. Creat 0.8 7/17/25.   Non oliguric BRADLEY, severe volume overload, soft BP related to liver disease. Suspect HRS BRADLEY but also acute infx / SBP, tense ascites and HFmrHF contributing hemodynamic factors.   (UA bland, FENA and Kristie low c/w HRS BRADLEY/ prerenal physiology (CRS/ infx))  Now on albumin, Midodrine and octreotide.  Creatinine trending slowly better, excellent UO and diuresing.   Recs:  - Cont midodrine and octreotide  - continue current dose alb and IV diuretic  - has resumed spironolactone as well     ESLD - acute decompensation with SBP  Chronic due to ETOH, reports abstinent since July 1, not candidate for Tx eval.  Severe portal HTN, ascites and varices/ coagulopathy. Bili better  No severe encephalopathy or other new complications.      SBP - on ceftriaxone, bcx and ascites cx neg so far. Repeat Diag para with improvement in cell counts     Severe hyponatremia - mild in past and now severe due to ADH activation and BRADLEY. Trending better with diuresis / alb at appropriate rate. Can start cutting back frequency of checks tomorrow     Lactic acidosis - improved     Soft BP - on midodrine 10 mg tid     HFmrEF, TR MR and mild AoS and diastolic dysfxn - suspect cirrhotic CM     Anemia mild -  no reported GI bld sx.      Coagulopathy - INR 1.9-2.0, didn't respond to VitK       SUBJECTIVE:  Patient new to me.  Reviewed chart at length.  Feeling better.  Legs less edematous, compression wraps off for the first time in several days.  Bored of the food here.  Feels like he's probably trending better.  Reviewed lab trends at length with him.    OBJECTIVE:  Physical Exam   Temp: 98.5  F (36.9  C) Temp src: Oral BP: 94/53 Pulse: 73   Resp: 18 SpO2: 96 % O2 Device: None (Room air)    Vitals:    08/01/25 0634 08/01/25 1609 08/02/25 0552   Weight: 101.6 kg (223  lb 14.4 oz) 108.6 kg (239 lb 6.4 oz) 106 kg (233 lb 11 oz)     Vital Signs with Ranges  Temp:  [98  F (36.7  C)-98.5  F (36.9  C)] 98.5  F (36.9  C)  Pulse:  [63-73] 73  Resp:  [16-18] 18  BP: ()/(50-59) 94/53  SpO2:  [96 %-99 %] 96 %  I/O last 3 completed shifts:  In: 360 [P.O.:360]  Out: 3800 [Urine:3800]    @TMAXR(24)@    Patient Vitals for the past 72 hrs:   Weight   08/02/25 0552 106 kg (233 lb 11 oz)   08/01/25 1609 108.6 kg (239 lb 6.4 oz)     Intake/Output Summary (Last 24 hours) at 8/4/2025 1210  Last data filed at 8/4/2025 1020  Gross per 24 hour   Intake 520 ml   Output 3550 ml   Net -3030 ml       PHYSICAL EXAM:  General - A & O x 3, NAD jaundice   HEENT - +icteric sclerae   Respiratory - Lungs CTA bilat   Cardiovascular - AP RRR with murmur all precordium  Abdomen - mildly distended   Extremities - well perfused, 2+ pitting now into calves  Neurologic - grossly intact no myoclonus  Psych:  Judgement intact, affect WNL  :  madison with bhargavi urine    LABORATORY STUDIES:     Recent Labs   Lab 08/03/25  0636 08/02/25  0606 08/01/25  0656 07/31/25  2215   WBC 8.9 11.7* 13.3* 15.3*   RBC 2.83* 3.06* 3.44* 3.58*   HGB 9.6* 10.4* 11.6* 12.1*   HCT 26.3* 28.4* 32.7* 33.9*    169 240 277       Basic Metabolic Panel:  Recent Labs   Lab 08/04/25  0542 08/03/25  2132 08/03/25  1408 08/03/25  0946 08/03/25  0636 08/03/25  0152 08/02/25  1838 08/02/25  1510 08/02/25  0939 08/02/25  0606 08/01/25  1419 08/01/25  0656 08/01/25  0152 07/31/25  2215   *  126* 123* 121* 124* 125*  125* 122*   < > 121*  121*   < > 118*   < > 117*   < > 114*   POTASSIUM 3.2*  --   --   --  3.8  --   --  3.7  --  4.0  --  4.7  --  5.3   CHLORIDE 89*  --   --   --  88*  --   --  88*  --  85*  --  86*  --  86*   CO2 22  --   --   --  20*  --   --  17*  --  18*  --  15*  --  14*   BUN 79.6*  --   --   --  80.0*  --   --   --   --  79.2*  --  76.8*  --  76.1*   CR 3.67*  --   --   --  3.91*  --   --   --   --  4.04*  --   3.77*  --  3.89*   *  --   --   --  122*  --   --   --   --  127*  --  158*  --  190*   ANDRES 9.0  --   --   --  8.5*  --   --   --   --  8.1*  --  8.5*  --  8.4*    < > = values in this interval not displayed.       INR  Recent Labs   Lab 08/03/25  0636 08/01/25  0656 07/31/25 2217   INR 2.01* 1.92* 1.91*        Recent Labs   Lab Test 08/03/25  0636 08/02/25  0606 08/01/25 0656   INR 2.01*  --  1.92*   WBC 8.9 11.7* 13.3*   HGB 9.6* 10.4* 11.6*    169 240       Personally reviewed current labs      Moses Catarina  [facility]  [phone]

## 2025-08-04 NOTE — PLAN OF CARE
Problem: Adult Inpatient Plan of Care  Goal: Optimal Comfort and Wellbeing  Intervention: Provide Person-Centered Care  Recent Flowsheet Documentation  Taken 8/4/2025 0820 by Judith Gallagher RN  Trust Relationship/Rapport:   care explained   choices provided     Problem: Adult Inpatient Plan of Care  Goal: Absence of Hospital-Acquired Illness or Injury  Intervention: Prevent Skin Injury  Recent Flowsheet Documentation  Taken 8/4/2025 0820 by Judith Gallagher, RN  Body Position:   position maintained   weight shifting     Problem: Pain Acute  Goal: Optimal Pain Control and Function  Intervention: Prevent or Manage Pain  Recent Flowsheet Documentation  Taken 8/4/2025 0820 by Judith Gallagher, RN  Sensory Stimulation Regulation: television on  Medication Review/Management: medications reviewed   Goal Outcome Evaluation:       No complain of pain. Erazo catheter in patent, draining bhargavi colored urine. Ambulated outside the room, 1 person assist. IV x 2, both saline locked.

## 2025-08-04 NOTE — PROGRESS NOTES
[facility]    Medicine Progress Note - Hospitalist Service    Date of Admission:  7/31/2025    Assessment & Plan      Malcom Chowdhury is a 75 year old male admitted on 7/31/2025.  He has history of alcoholism, mood disorder, recently hospitalized 7/2-7/6, 7/16-7/17 found to have new diagnosis of bleeding esophageal varices and alcoholic cirrhosis, was on lasix /aldactone now with increasing shortness of breath and progressive abdominal distension. Pt has history of needing paracentesis with last reported tap 7/16-Paracentesis was without evidence of SBP. Pt also is markedly jaundiced. Pt reports abdominal pain 3/10.  He reports that this has been progressively worsening and feels similar to what he experienced prior to previous paracentesis.  He reports that he has an EGD scheduled for 8/8 but he does not have any repeat paracentesis scheduled.  He has not had much abdominal pain but has had some diarrhea.  He denies chest pain, fever, nausea, vomiting, or urinary symptoms.  He reports that he has been taking all of his medications as prescribed. Admitted for severe hyponatremia, BRADLEY, severe sepsis 2/2 sbp  -- S/p paracentesis on 08/01 and 08/03.   -- On ceftriaxone for SBP  -- Cultures NGTD  -- Nephrology following for BRADLEY and hyponatremia  -- GI following  -- Dispo: likely 2-4 days of stay        8/4 :       Sodium 122--127  Creatinine 4--3.67  Nephrology following    No fevers  Follow cultures  On iv ceftriaxone    On iv albumin, iv bumex, spironolactone  Lactulose, midodrine, rifaximin  Coagulopathy with INR of 2.01      Not medically ready for discharge at this time.  Multi day stay        A/p :       #Hyponatremia, severe  #AGMA  #BRADLEY-prerenal, ?Hepatorenal syndrome  -- Suspect hyponatremia to be multifactorial: dilutional, dehydration, Pseudohyponatremia 2/2 hyperbilirubinemia.   -- No AMS  -- CT-a/p: cirrhosis, large volume ascites, portal hypertension, mild splenomegaly and  intraabdominal venous collaterals. Normal kidney and bladder.  -- Monitor BMP trend  -- Avoid nephrotoxic meds  -- Nephrology consult appreciated: Albumin gtt q12, octreotide, midodrine, bumex q12h, spiranolactone  -- S/p bicarb- sodium q4 hrs. Na: 114-->117-->119--->124  -- Check serum osm, lipid panel- for pseudohyponatremia    #Severe sepsis with marko, lactic acidosis  #SBP  #Lactic acidosis   -- SIRS (tachypnea, leukocytosis)   -- Bcx NGTD  -- IV antibiotics- empirically rocephin to cover broad-spectrum coverage for gram-positive negatives and anaerobes while  culture and sensitivities are pending  -- S/p Paracentesis with albumin on 08/01. 6.4L of hazy bhargavi colored fluid removed. Ascitic fluid analysis consistent with SBP. Culture pending  -- S/p Paracentesis on 08/03. 1.7 L of bhargavi colored fluid removed. Ascitic fluid analysis sent. Cell count went down significantly.    #Decompensated  alcoholic cirrhosis of liver with ascites   #Hyperbilirubinemia, Cholestatic LFTs  #Alcoholic hepatitis  -- Presented with severe jaundice, total bili up to 26-->28.2  MELD 3.0: 40 at 8/4/2025  2:49 PM  MELD-Na: 39 at 8/4/2025  2:49 PM  Calculated from:  Serum Creatinine: 3.67 mg/dL (Using max of 3 mg/dL) at 8/4/2025  5:42 AM  Serum Sodium: 127 mmol/L at 8/4/2025  2:49 PM  Total Bilirubin: 23.6 mg/dL at 8/3/2025  6:36 AM  Serum Albumin: 3.5 g/dL at 8/3/2025  6:36 AM  INR(ratio): 2.01 at 8/3/2025  6:36 AM  Age at listing (hypothetical): 75 years  Sex: Male at 8/4/2025  2:49 PM     -- Miki DF: 60's. Holding steroids due to SBP-defer to GI  -- Prognosis is worrisome  -- GI consult appreciated: Vitamin k 10mg iv. Begin lactulose once daily. Follo wup EGD for esophageal varices on 08/08/25    #Portal hypertension  #Large volume ascites due to above  -- Recently started on Lasix and spironolactone to control ascites   -- Currently on Bumex and sprinolactone (started on 08/03)   -- Paracentesis asa above     #Esophageal varices,  recent banding 7/3  -- No signs of ongoing bleeding  -- Hgb stable  than at recent discharge  -- Scheduled for followup EGD 8/8     #Alcoholism  -- last EtOH use ~7/1 denies any active drinking  -- States sobriety going well. He had one chem dep counseling session but did not like his counselor.    #RLS  -- Started on gabapentin during last hospital stay with much benefit  PRN dosing     #Peripheral edema likely due to anasarca and liver cirrhosis and hyperammonemia  -- Continue his home compression stockings    Chronic anemia, mild  -- Monitor. No e/o bleeding       Chronic stable issues:     # Recent diagnosis of HFpEF  -- Recent Echocardiogram noting a mildly reduced LVEF 50 to 55%, with no prior echocardiogram available for review. Elevated LVFP  -- Suspect possibly myocarditis due to alcoholism.   -- Recommend follow up TTE in 1-2 months while sober. If remains abnormal, needs to see cards for evaluation    #Heart murmur  #Mild-moderate aortic stenosis  -followup echo as above     # 9mm liver nodule  -- MRI showed 9 mm hyperenhancing nodule in segment 4A of the liver  -- Radiologist recommends follow-up MRI in 3 to 6 months              Diet: 2 Gram Sodium Diet  Snacks/Supplements Adult: Ensure Plus High Protein; Between Meals  Fluid restriction 1200 ML FLUID  Snacks/Supplements Adult: Special K Bar; Between Meals    DVT Prophylaxis: Pneumatic Compression Devices  Erazo Catheter: PRESENT, indication: Other (Comment) (Accurate output measurement)  Lines: None     Cardiac Monitoring: ACTIVE order. Indication: QTc prolonging medication (48 hours)  Code Status: Full Code      Clinically Significant Risk Factors        # Hypokalemia: Lowest K = 3.2 mmol/L in last 2 days, will replace as needed  # Hyponatremia: Lowest Na = 120 mmol/L in last 2 days, will monitor as appropriate  # Hypochloremia: Lowest Cl = 88 mmol/L in last 2 days, will monitor as appropriate  # Hypocalcemia: Lowest Ca = 8.5 mg/dL in last 2 days,  will monitor and replace as appropriate     # Hypoalbuminemia: Lowest albumin = 2.5 g/dL at 7/31/2025 10:15 PM, will monitor as appropriate    # Coagulation Defect: INR = 2.01 (Ref range: 0.85 - 1.15) and/or PTT = 42 Seconds (Ref range: 22 - 38 Seconds), will monitor for bleeding   # Acute Kidney Injury, unspecified: based on a >150% or 0.3 mg/dL increase in last creatinine compared to past 90 day average, will monitor renal function              # Severe Malnutrition: based on nutrition assessment and treatment provided per dietitian's recommendations., PRESENT ON ADMISSION     # Financial/Environmental Concerns:           Social Drivers of Health    Tobacco Use: Unknown (7/16/2025)    Patient History     Smoking Tobacco Use: Never     Smokeless Tobacco Use: Unknown          Disposition Plan     Medically Ready for Discharge: Anticipated in 2-4 Days             Tip Partida MD  Hospitalist Service  [facility]  Securely message with [employer] (more info)  Text page via [employer] Paging/Directory   ______________________________________________________________________      Physical Exam   Vital Signs: Temp: 97.9  F (36.6  C) Temp src: Oral BP: 99/56 Pulse: 70   Resp: 18 SpO2: 97 % O2 Device: None (Room air)    Weight: 233 lbs 11 oz    GEN: Alert and oriented. Not in acute distress.  HEENT: Atraumatic, mucous membrane- moist and pink. Icteric sclerae.  Chest: Decreased bilateral air entry basally.  CVS: S1S2 regular.   Abdomen: Distended, non-tender. No guarding or rigidity. Bowel sounds active.   Extremities: b/l LE edmea. Anasarca  CNS: No involuntary movements.  Skin: no cyanosis or clubbing.  Jaundice    Medical Decision Making       52 MINUTES SPENT BY ME on the date of service doing chart review, history, exam, documentation & further activities per the note.      Data

## 2025-08-04 NOTE — PROGRESS NOTES
GI PROGRESS NOTE  8/4/2025  Malcom Chowdhury  1950  /-91    Subjective:  He reports having 3 large loose Bms daily, brown in color.  No black or bloody stools.  No abdominal pain.  Appetite is good.     Objective:    Patient Vitals for the past 24 hrs:   BP Temp Temp src Pulse Resp SpO2   08/04/25 0740 94/53 98.5  F (36.9  C) Oral 73 18 96 %   08/04/25 0500 98/50 98  F (36.7  C) Oral 72 16 97 %   08/03/25 2300 110/59 98  F (36.7  C) Oral 63 18 98 %   08/03/25 1937 100/55 98  F (36.7  C) Oral 67 18 99 %   08/03/25 1509 105/53 98.5  F (36.9  C) Oral 68 18 97 %   08/03/25 1212 109/53 -- -- 72 -- --     Body mass index is 34.49 kg/m .  Gen: NAD  GI: Distended, BS positive, soft, non-tender    Laboratory  Recent Labs   Lab Test 08/03/25  0636 08/02/25  0606 08/01/25  0656 07/31/25  2215   WBC 8.9 11.7* 13.3* 15.3*   HGB 9.6* 10.4* 11.6* 12.1*   MCV 93 93 95 95    169 240 277   INR 2.01*  --  1.92* 1.91*     Recent Labs   Lab Test 08/04/25  0542 08/03/25  2132 08/03/25  1408 08/03/25  0946 08/03/25  0636 08/02/25  1838 08/02/25  1510 08/02/25  0939 08/02/25  0606   *  126* 123* 121*   < > 125*  125*   < > 121*  121*   < > 118*   POTASSIUM 3.2*  --   --   --  3.8  --  3.7  --  4.0   CHLORIDE 89*  --   --   --  88*  --  88*  --  85*   CO2 22  --   --   --  20*  --  17*  --  18*   BUN 79.6*  --   --   --  80.0*  --   --   --  79.2*   CR 3.67*  --   --   --  3.91*  --   --   --  4.04*   ANIONGAP 15  --   --   --  17*  --  16*  --  15   ANDRES 9.0  --   --   --  8.5*  --   --   --  8.1*   *  --   --   --  122*  --   --   --  127*    < > = values in this interval not displayed.     Recent Labs   Lab Test 08/03/25  0636 08/02/25  0606 08/01/25  1222 08/01/25  0656 07/31/25  2215 07/17/25  0703 07/16/25  0942 07/03/25  0635 07/02/25  1759   ALBUMIN 3.5 2.8*  --  2.7* 2.5*   < > 3.0*   < >  --    BILITOTAL 23.6* 23.1*  --  28.2* 26.5*   < > 26.9*   < >  --    ALT 64 65  --  83* 84*   < > 44   < >   --    AST 93* 92*  --  131* 135*   < > 114*   < >  --    ALKPHOS 129 124  --  155* 166*   < > 161*   < >  --    PROTEIN  --   --  Negative  --   --   --   --   --  Negative   LIPASE  --   --   --   --  212*  --  160*  --   --     < > = values in this interval not displayed.   MELD 3.0: 40 at 8/4/2025  5:42 AM  MELD-Na: 39 at 8/4/2025  5:42 AM  Calculated from:  Serum Creatinine: 3.67 mg/dL (Using max of 3 mg/dL) at 8/4/2025  5:42 AM  Serum Sodium: 126 mmol/L at 8/4/2025  5:42 AM  Total Bilirubin: 23.6 mg/dL at 8/3/2025  6:36 AM  Serum Albumin: 3.5 g/dL at 8/3/2025  6:36 AM  INR(ratio): 2.01 at 8/3/2025  6:36 AM  Age at listing (hypothetical): 75 years  Sex: Male at 8/4/2025  5:42 AM    US Paracentesis without Albumin  Result Date: 8/3/2025- 1.7 liters removed  Absolute neutrophils- 15,156    US Paracentesis with Albumin  Result Date: 8/1/2025- 6.4 liters removed  Absolute neutrophils- 1789    Blood cultures 8/1 no growth  Ascites culture 8/1 and 8/3 no growth    Assessment:  He is a 75-year-old man with alcohol use disorder, recently diagnosed cirrhosis with decompensation evidenced by esophageal varices, ascites, colopathy; also RLS, aortic stenosis, recently diagnosed CHF (EF 50 to 55% on 7/3 ECHO).  This is his third admission in the past month; presented with abdominal distention and shortness of breath.     1.  Cirrhosis, decompensated - SBP  MELD Na and MELD 3.0 are 39 -- suggests poor prognosis.   Miki DF is 67.  Would typically treat with steroids though have been holding of due to leukocytosis/infection.  Unlikely transplant candidate due to age and recent use of alcohol.  Complicated by:  1. Ascites (see below)  2. Coagulopathy - INR 2.01 (gradually increasing)  3. Portal HTN -- eso varices (large). S/P banding 7/3/25. Due for follow-up 8/8/25.  Splenomegaly.   4.  Hyperbilirubinemia - suspect combo of cirrhosis and EtOH hepatitis; however, T bili usually peaks 1-2 weeks after onset of EtOH hepatitis.   Increase in bili likely due to underlying decompensation.  Last EtOH 7/1.  T bili slightly trending down.  5. S/p single dose of IV Vit K 10 mg - no significant response  6. Not a transplant candidate 2/2 to recalcitrant alcohol use  7. INR did not respond to IV Vit K 10 mg x2 doses  8. SBP- Repeat Para on 8/3/25 shows considerable improvement in neutrophil count, patient is responding to abx.  He's been getting regular albumin.  Ceftriaxone started 8/1.     No evidence of encephalopathy.  However, bowels have been a bit sluggish-- he's getting lactulose 15mL daily since 8/1 and Rifaximin TID since 8/1.     2. Ascites  Had been on lasix 20mg daily, spironolactone 25mg daily- held on admission due to BRADLEY.  Renal managing fluids and diuretics.       3.  BRADLEY  Creatinine was normal through July 17.  Now 3.7.   HRS suspected, and appreciate management per renal.     4.  Hyponatremia  Fluid overload. Dehydration another factor.  Mental status appears at baseline.     5.  Anemia  No mention of overt GI bleeding.  Likely multifactorial.    Patient Active Problem List   Diagnosis    GI bleed    Secondary esophageal varices with bleeding (H)    ABLA (acute blood loss anemia)    Alcoholic cirrhosis of liver with ascites (H)    Alcoholism (H)    Lymphedema    Chronic systolic heart failure (H)    Loose stools    Restless legs syndrome (RLS)    Hyperbilirubinemia    Hyponatremia    Jaundice    Ascites due to alcoholic cirrhosis (H)    Leukocytosis, unspecified type    BRADLEY (acute kidney injury)    End stage liver disease (H)    Chronic diastolic heart failure (H)    Portal hypertension (H)    Secondary esophageal varices without bleeding (H)    Metabolic acidemia    Abdominal distension        Plan:    1. Complete 7 days of antibiotics (end 8/7) followed by daily cipro 500 mg for SBP prophylaxis.  2.  Daily MELD.  3.  Continue lactulose and Xifaxan.  Goal of 3 Bms daily.  4.  Outpatient Hep follow up and EGD as planned.  5.  GI  following.    22 minutes of total time was spent providing patient care, including patient evaluation, reviewing documentation/test results and .                                                Kimberly Tiwari PA-C  Thank you for the opportunity to participate in the care of this patient.   Please feel free to call me with any questions or concerns.  Phone number [phone].

## 2025-08-04 NOTE — PLAN OF CARE
Problem: Adult Inpatient Plan of Care  Goal: Plan of Care Review  Description: The Plan of Care Review/Shift note should be completed every shift.  The Outcome Evaluation is a brief statement about your assessment that the patient is improving, declining, or no change.  This information will be displayed automatically on your shift  note.  Outcome: Progressing     Problem: Pain Acute  Goal: Optimal Pain Control and Function  Outcome: Progressing  Intervention: Prevent or Manage Pain  Recent Flowsheet Documentation  Taken 8/4/2025 0000 by Jg Brooks RN  Sensory Stimulation Regulation: television on  Medication Review/Management: medications reviewed  Taken 8/3/2025 2200 by Jg Brooks RN  Sensory Stimulation Regulation: television on  Medication Review/Management: medications reviewed     Problem: Gas Exchange Impaired  Goal: Optimal Gas Exchange  Outcome: Progressing  Intervention: Optimize Oxygenation and Ventilation  Recent Flowsheet Documentation  Taken 8/4/2025 0000 by Jg Brooks RN  Head of Bed (HOB) Positioning: HOB at 20-30 degrees  Taken 8/3/2025 2200 by Jg Brooks RN  Head of Bed (HOB) Positioning: HOB at 20-30 degrees     Problem: Liver Failure  Goal: Optimal Gastrointestinal Function  Outcome: Progressing     Problem: Liver Failure  Goal: Blood Glucose Level Within Target Range  Outcome: Progressing     Problem: Liver Failure  Goal: Absence of Infection Signs and Symptoms  Outcome: Progressing     Problem: Liver Failure  Goal: Optimal Pain Control, Comfort and Function  Outcome: Progressing     Problem: Comorbidity Management  Goal: Maintenance of Heart Failure Symptom Control  Outcome: Progressing  Intervention: Maintain Heart Failure Management  Recent Flowsheet Documentation  Taken 8/4/2025 0000 by Jg Brooks RN  Medication Review/Management: medications reviewed  Taken 8/3/2025 2200 by Jg Brooks RN  Medication Review/Management: medications  "reviewed    Goal Outcome Evaluation:    Patient AOX4. Able to make needs known. VSS. Denies SOB and nausea. Patient reports ongoing bilateral calf \"cramping\" and pain overnight; ambulated patient outside of room with reports of reduction in overall pain and cramping. Erazo in place with adequate urine output. Tele - Sinus with 1st degree AV block. Sleeping between cares.          "

## 2025-08-05 ENCOUNTER — APPOINTMENT (OUTPATIENT)
Dept: PHYSICAL THERAPY | Facility: HOSPITAL | Age: 75
DRG: 432 | End: 2025-08-05
Attending: INTERNAL MEDICINE
Payer: COMMERCIAL

## 2025-08-05 ENCOUNTER — APPOINTMENT (OUTPATIENT)
Dept: OCCUPATIONAL THERAPY | Facility: HOSPITAL | Age: 75
DRG: 432 | End: 2025-08-05
Attending: INTERNAL MEDICINE
Payer: COMMERCIAL

## 2025-08-05 LAB
ALBUMIN SERPL BCG-MCNC: 3.4 G/DL (ref 3.5–5.2)
ALP SERPL-CCNC: 136 U/L (ref 40–150)
ALT SERPL W P-5'-P-CCNC: 81 U/L (ref 0–70)
ANION GAP SERPL CALCULATED.3IONS-SCNC: 17 MMOL/L (ref 7–15)
AST SERPL W P-5'-P-CCNC: 128 U/L (ref 0–45)
BASOPHILS # BLD AUTO: 0.1 10E3/UL (ref 0–0.2)
BASOPHILS NFR BLD AUTO: 1 %
BILIRUB SERPL-MCNC: 24.3 MG/DL
BUN SERPL-MCNC: 82.5 MG/DL (ref 8–23)
CALCIUM SERPL-MCNC: 9.2 MG/DL (ref 8.8–10.4)
CHLORIDE SERPL-SCNC: 88 MMOL/L (ref 98–107)
CREAT SERPL-MCNC: 3.59 MG/DL (ref 0.67–1.17)
EGFRCR SERPLBLD CKD-EPI 2021: 17 ML/MIN/1.73M2
EOSINOPHIL # BLD AUTO: 0.4 10E3/UL (ref 0–0.7)
EOSINOPHIL NFR BLD AUTO: 3 %
ERYTHROCYTE [DISTWIDTH] IN BLOOD BY AUTOMATED COUNT: 14.2 % (ref 10–15)
GLUCOSE SERPL-MCNC: 177 MG/DL (ref 70–99)
HCO3 SERPL-SCNC: 23 MMOL/L (ref 22–29)
HCT VFR BLD AUTO: 29 % (ref 40–53)
HGB BLD-MCNC: 10.6 G/DL (ref 13.3–17.7)
IMM GRANULOCYTES # BLD: 0.2 10E3/UL
IMM GRANULOCYTES NFR BLD: 2 %
INR PPP: 2.06 (ref 0.85–1.15)
LABORATORY REPORT: NORMAL
LYMPHOCYTES # BLD AUTO: 0.5 10E3/UL (ref 0.8–5.3)
LYMPHOCYTES NFR BLD AUTO: 5 %
MAGNESIUM SERPL-MCNC: 2.1 MG/DL (ref 1.7–2.3)
MCH RBC QN AUTO: 34.2 PG (ref 26.5–33)
MCHC RBC AUTO-ENTMCNC: 36.6 G/DL (ref 31.5–36.5)
MCV RBC AUTO: 94 FL (ref 78–100)
MONOCYTES # BLD AUTO: 1.1 10E3/UL (ref 0–1.3)
MONOCYTES NFR BLD AUTO: 11 %
NEUTROPHILS # BLD AUTO: 8.6 10E3/UL (ref 1.6–8.3)
NEUTROPHILS NFR BLD AUTO: 80 %
NRBC # BLD AUTO: 0 10E3/UL
NRBC BLD AUTO-RTO: 0 /100
PETH INTERPRETATION: NORMAL
PLATELET # BLD AUTO: 157 10E3/UL (ref 150–450)
PLPETH BLD-MCNC: <10 NG/ML
POPETH BLD-MCNC: 21 NG/ML
POTASSIUM SERPL-SCNC: 3.5 MMOL/L (ref 3.4–5.3)
POTASSIUM SERPL-SCNC: 3.5 MMOL/L (ref 3.4–5.3)
PROT SERPL-MCNC: 5.9 G/DL (ref 6.4–8.3)
PROTHROMBIN TIME: 23.4 SECONDS (ref 11.8–14.8)
RBC # BLD AUTO: 3.1 10E6/UL (ref 4.4–5.9)
SODIUM SERPL-SCNC: 128 MMOL/L (ref 135–145)
WBC # BLD AUTO: 10.8 10E3/UL (ref 4–11)

## 2025-08-05 PROCEDURE — 82310 ASSAY OF CALCIUM: CPT | Performed by: PHYSICIAN ASSISTANT

## 2025-08-05 PROCEDURE — 97161 PT EVAL LOW COMPLEX 20 MIN: CPT | Mod: GP

## 2025-08-05 PROCEDURE — 250N000011 HC RX IP 250 OP 636

## 2025-08-05 PROCEDURE — 250N000011 HC RX IP 250 OP 636: Mod: JZ | Performed by: INTERNAL MEDICINE

## 2025-08-05 PROCEDURE — 250N000013 HC RX MED GY IP 250 OP 250 PS 637: Performed by: INTERNAL MEDICINE

## 2025-08-05 PROCEDURE — 83735 ASSAY OF MAGNESIUM: CPT | Performed by: INTERNAL MEDICINE

## 2025-08-05 PROCEDURE — P9047 ALBUMIN (HUMAN), 25%, 50ML: HCPCS

## 2025-08-05 PROCEDURE — 258N000003 HC RX IP 258 OP 636: Performed by: INTERNAL MEDICINE

## 2025-08-05 PROCEDURE — 85610 PROTHROMBIN TIME: CPT | Performed by: PHYSICIAN ASSISTANT

## 2025-08-05 PROCEDURE — 120N000001 HC R&B MED SURG/OB

## 2025-08-05 PROCEDURE — 97116 GAIT TRAINING THERAPY: CPT | Mod: GP

## 2025-08-05 PROCEDURE — P9047 ALBUMIN (HUMAN), 25%, 50ML: HCPCS | Performed by: INTERNAL MEDICINE

## 2025-08-05 PROCEDURE — 250N000013 HC RX MED GY IP 250 OP 250 PS 637: Performed by: STUDENT IN AN ORGANIZED HEALTH CARE EDUCATION/TRAINING PROGRAM

## 2025-08-05 PROCEDURE — 99232 SBSQ HOSP IP/OBS MODERATE 35: CPT | Performed by: PHYSICIAN ASSISTANT

## 2025-08-05 PROCEDURE — 250N000013 HC RX MED GY IP 250 OP 250 PS 637: Performed by: HOSPITALIST

## 2025-08-05 PROCEDURE — 250N000013 HC RX MED GY IP 250 OP 250 PS 637: Performed by: PHYSICIAN ASSISTANT

## 2025-08-05 PROCEDURE — 85025 COMPLETE CBC W/AUTO DIFF WBC: CPT | Performed by: PHYSICIAN ASSISTANT

## 2025-08-05 PROCEDURE — 84132 ASSAY OF SERUM POTASSIUM: CPT | Performed by: INTERNAL MEDICINE

## 2025-08-05 PROCEDURE — 99233 SBSQ HOSP IP/OBS HIGH 50: CPT | Performed by: INTERNAL MEDICINE

## 2025-08-05 PROCEDURE — P9047 ALBUMIN (HUMAN), 25%, 50ML: HCPCS | Performed by: PHYSICIAN ASSISTANT

## 2025-08-05 PROCEDURE — 36415 COLL VENOUS BLD VENIPUNCTURE: CPT | Performed by: PHYSICIAN ASSISTANT

## 2025-08-05 PROCEDURE — 250N000011 HC RX IP 250 OP 636: Performed by: INTERNAL MEDICINE

## 2025-08-05 PROCEDURE — 250N000011 HC RX IP 250 OP 636: Performed by: PHYSICIAN ASSISTANT

## 2025-08-05 PROCEDURE — 97535 SELF CARE MNGMENT TRAINING: CPT | Mod: GO

## 2025-08-05 PROCEDURE — 250N000012 HC RX MED GY IP 250 OP 636 PS 637: Mod: JA | Performed by: INTERNAL MEDICINE

## 2025-08-05 PROCEDURE — 97166 OT EVAL MOD COMPLEX 45 MIN: CPT | Mod: GO

## 2025-08-05 RX ORDER — ALBUMIN (HUMAN) 12.5 G/50ML
12.5 SOLUTION INTRAVENOUS ONCE
Status: COMPLETED | OUTPATIENT
Start: 2025-08-05 | End: 2025-08-05

## 2025-08-05 RX ORDER — ALBUMIN (HUMAN) 12.5 G/50ML
12.5 SOLUTION INTRAVENOUS 2 TIMES DAILY
Status: COMPLETED | OUTPATIENT
Start: 2025-08-05 | End: 2025-08-06

## 2025-08-05 RX ORDER — POTASSIUM CHLORIDE 1500 MG/1
20 TABLET, EXTENDED RELEASE ORAL ONCE
Status: COMPLETED | OUTPATIENT
Start: 2025-08-05 | End: 2025-08-05

## 2025-08-05 RX ADMIN — THIAMINE HCL TAB 100 MG 100 MG: 100 TAB at 08:10

## 2025-08-05 RX ADMIN — OCTREOTIDE ACETATE 50 MCG/HR: 500 INJECTION, SOLUTION INTRAVENOUS; SUBCUTANEOUS at 00:12

## 2025-08-05 RX ADMIN — BUMETANIDE 2 MG: 0.25 INJECTION INTRAMUSCULAR; INTRAVENOUS at 10:22

## 2025-08-05 RX ADMIN — ALBUMIN HUMAN 12.5 G: 0.25 SOLUTION INTRAVENOUS at 06:07

## 2025-08-05 RX ADMIN — MIDODRINE HYDROCHLORIDE 10 MG: 5 TABLET ORAL at 17:11

## 2025-08-05 RX ADMIN — MIDODRINE HYDROCHLORIDE 10 MG: 5 TABLET ORAL at 11:48

## 2025-08-05 RX ADMIN — MIDODRINE HYDROCHLORIDE 10 MG: 5 TABLET ORAL at 08:09

## 2025-08-05 RX ADMIN — RIFAXIMIN 550 MG: 550 TABLET ORAL at 20:04

## 2025-08-05 RX ADMIN — LACTULOSE SOLUTION USP, 10 G/15 ML 10 G: 10 SOLUTION ORAL; RECTAL at 08:10

## 2025-08-05 RX ADMIN — RIFAXIMIN 550 MG: 550 TABLET ORAL at 08:10

## 2025-08-05 RX ADMIN — POTASSIUM CHLORIDE 20 MEQ: 1500 TABLET, EXTENDED RELEASE ORAL at 00:19

## 2025-08-05 RX ADMIN — RIFAXIMIN 550 MG: 550 TABLET ORAL at 14:37

## 2025-08-05 RX ADMIN — CEFTRIAXONE SODIUM 2 G: 2 INJECTION, POWDER, FOR SOLUTION INTRAMUSCULAR; INTRAVENOUS at 23:53

## 2025-08-05 RX ADMIN — GABAPENTIN 300 MG: 300 CAPSULE ORAL at 01:46

## 2025-08-05 RX ADMIN — BUMETANIDE 2 MG: 0.25 INJECTION INTRAMUSCULAR; INTRAVENOUS at 20:04

## 2025-08-05 RX ADMIN — CEFTRIAXONE SODIUM 2 G: 2 INJECTION, POWDER, FOR SOLUTION INTRAMUSCULAR; INTRAVENOUS at 00:19

## 2025-08-05 RX ADMIN — ALBUMIN HUMAN 12.5 G: 0.25 SOLUTION INTRAVENOUS at 20:04

## 2025-08-05 RX ADMIN — ALBUMIN HUMAN 12.5 G: 0.25 SOLUTION INTRAVENOUS at 08:09

## 2025-08-05 RX ADMIN — SPIRONOLACTONE 25 MG: 25 TABLET, FILM COATED ORAL at 11:48

## 2025-08-05 RX ADMIN — Medication 1 TABLET: at 08:11

## 2025-08-05 ASSESSMENT — ACTIVITIES OF DAILY LIVING (ADL)
ADLS_ACUITY_SCORE: 43
ADLS_ACUITY_SCORE: 46
ADLS_ACUITY_SCORE: 43
ADLS_ACUITY_SCORE: 43
ADLS_ACUITY_SCORE: 46
ADLS_ACUITY_SCORE: 43

## 2025-08-05 NOTE — PROGRESS NOTES
08/05/25 1502   Appointment Info   Signing Clinician's Name / Credentials (OT) Carolee Pereira/L   Living Environment   People in Home spouse   Current Living Arrangements house   Home Accessibility stairs to enter home;stairs within home   Number of Stairs, Main Entrance 5   Number of Stairs, Within Home, Primary seven   Transportation Anticipated family or friend will provide   Living Environment Comments split level   Self-Care   Current Activity Tolerance moderate   Equipment Currently Used at Home none   Activity/Exercise/Self-Care Comment U with ADLs   General Information   Onset of Illness/Injury or Date of Surgery 07/31/25   Referring Physician Helga   Patient/Family Therapy Goal Statement (OT) home   Additional Occupational Profile Info/Pertinent History of Current Problem Pt admitted with CHF, cirrhosis, esophageal varacies   Existing Precautions/Restrictions fall   Cognitive Status Examination   Cognitive Status Comments Pt needs simple cues.  Pt is confused on date   Visual Perception   Visual Impairment/Limitations WFL   Sensory   Sensory Comments UE's intact   Range of Motion Comprehensive   Comment, General Range of Motion WFLs for ADLs   Strength Comprehensive (MMT)   Comment, General Manual Muscle Testing (MMT) Assessment WFLs for ADLs   Coordination   Upper Extremity Coordination No deficits were identified   Bed Mobility   Comment (Bed Mobility) NT   Transfers   Transfer Comments CGA   Balance   Balance Comments CGA with FWW   Activities of Daily Living   BADL Assessment/Intervention lower body dressing   Lower Body Dressing Assessment/Training   Comment, (Lower Body Dressing) Pt needs Min A as it is difficult to reach to feet   Clinical Impression   Criteria for Skilled Therapeutic Interventions Met (OT) Yes, treatment indicated   OT Diagnosis impaired ADL independence   OT Problem List-Impairments impacting ADL activity tolerance impaired;balance;cognition;flexibility;mobility    Assessment of Occupational Performance 3-5 Performance Deficits   Planned Therapy Interventions (OT) ADL retraining;balance training;cognition;transfer training;other (see comments)  (endurance)   Clinical Decision Making Complexity (OT) detailed assessment/moderate complexity   Risk & Benefits of therapy have been explained evaluation/treatment results reviewed;participants included;patient   Clinical Impression Comments Pt seen bedside for OT eval and treatment.  Pt demonstrates decreased independence with ADLs and mobbility as well as impaired cognition and act tolerance.  OT to cotninue to address.  Recommend home with family support as needed.   OT Total Evaluation Time   OT Eval, Moderate Complexity Minutes (63429) 10   OT Goals   Therapy Frequency (OT) 5 times/week   OT Predicted Duration/Target Date for Goal Attainment 08/12/25   OT Goals Hygiene/Grooming;Lower Body Dressing;OT Goal 1;OT Goal 2   OT: Hygiene/Grooming supervision/stand-by assist;while standing   OT: Lower Body Dressing Supervision/stand-by assist;using adaptive equipment   OT: Goal 1 Pt will particiapte in formal cognitive assessment as needed to assist with discharge planning   OT: Goal 2 Pt will tolerate 10 minutes UE ex to increase act tolerance for ADLs   OT Discharge Planning   OT Plan 3LB dressing with AE, standing G/H, cog screen, UE ex with wt   OT Discharge Recommendation (DC Rec) home with assist   OT Rationale for DC Rec Recommend home with assist as pt is doing well with basic ADLs and has family support   OT Brief overview of current status SBA/cGA

## 2025-08-05 NOTE — PROGRESS NOTES
GI PROGRESS NOTE  8/5/2025  Malcom Chowdhury  1950  /-61    Subjective:  He denies having complaints.  Still having 3 soft/semi loose Bms per day with mild urgency.  Eating well.  Urinary catheter in place.     Objective:    Patient Vitals for the past 24 hrs:   BP Temp Temp src Pulse Resp SpO2   08/05/25 0740 89/52 97.7  F (36.5  C) Oral 75 18 97 %   08/05/25 0530 90/51 -- -- -- -- --   08/05/25 0406 92/50 97.9  F (36.6  C) Oral 66 18 98 %   08/05/25 0029 108/56 97.4  F (36.3  C) Oral 67 18 96 %   08/04/25 1900 110/59 97.7  F (36.5  C) Oral 62 18 96 %   08/04/25 1528 99/56 97.9  F (36.6  C) Oral 70 18 97 %   08/04/25 1239 103/55 97.4  F (36.3  C) Oral 68 18 95 %     Body mass index is 34.49 kg/m .  Gen: NAD  Skin: Jaundice  GI: Non-distended, BS positive, soft, non-tender    Laboratory  Recent Labs   Lab Test 08/05/25 0526 08/03/25  0636 08/02/25  0606 08/01/25  0656   WBC 10.8 8.9 11.7* 13.3*   HGB 10.6* 9.6* 10.4* 11.6*   MCV 94 93 93 95    158 169 240   INR 2.06* 2.01*  --  1.92*     Recent Labs   Lab Test 08/05/25  0526 08/04/25  2202 08/04/25  2118 08/04/25  1449 08/04/25  0542 08/03/25  0946 08/03/25  0636   * 127*  --  127* 126*  126*   < > 125*  125*   POTASSIUM 3.5  3.5 3.3*  --   --  3.2*  --  3.8   CHLORIDE 88*  --   --   --  89*  --  88*   CO2 23  --   --   --  22  --  20*   BUN 82.5*  --   --   --  79.6*  --  80.0*   CR 3.59*  --   --   --  3.67*  --  3.91*   ANIONGAP 17*  --   --   --  15  --  17*   ANDRES 9.2  --   --   --  9.0  --  8.5*   *  --  136*  --  162*  --  122*    < > = values in this interval not displayed.     Recent Labs   Lab Test 08/05/25  0526 08/03/25  0636 08/02/25  0606 08/01/25  1222 08/01/25  0656 07/31/25  2215 07/17/25  0703 07/16/25  0942 07/03/25  0635 07/02/25  1759   ALBUMIN 3.4* 3.5 2.8*  --    < > 2.5*   < > 3.0*   < >  --    BILITOTAL 24.3* 23.6* 23.1*  --    < > 26.5*   < > 26.9*   < >  --    ALT 81* 64 65  --    < > 84*   < > 44   < >   --    * 93* 92*  --    < > 135*   < > 114*   < >  --    ALKPHOS 136 129 124  --    < > 166*   < > 161*   < >  --    PROTEIN  --   --   --  Negative  --   --   --   --   --  Negative   LIPASE  --   --   --   --   --  212*  --  160*  --   --     < > = values in this interval not displayed.     MELD 3.0: 40 at 8/5/2025  5:26 AM  MELD-Na: 39 at 8/5/2025  5:26 AM  Calculated from:  Serum Creatinine: 3.59 mg/dL (Using max of 3 mg/dL) at 8/5/2025  5:26 AM  Serum Sodium: 128 mmol/L at 8/5/2025  5:26 AM  Total Bilirubin: 24.3 mg/dL at 8/5/2025  5:26 AM  Serum Albumin: 3.4 g/dL at 8/5/2025  5:26 AM  INR(ratio): 2.06 at 8/5/2025  5:26 AM  Age at listing (hypothetical): 75 years  Sex: Male at 8/5/2025  5:26 AM    US Paracentesis without Albumin  Result Date: 8/3/2025- 1.7 liters removed  Absolute neutrophils- 15,156     US Paracentesis with Albumin  Result Date: 8/1/2025- 6.4 liters removed  Absolute neutrophils- 1789     Blood cultures 8/1 no growth  Ascites culture 8/1 and 8/3 no growth    Assessment:  He is a 75-year-old man with alcohol use disorder, recently diagnosed cirrhosis with decompensation evidenced by esophageal varices, ascites, colopathy; also RLS, aortic stenosis, recently diagnosed CHF (EF 50 to 55% on 7/3 ECHO).  This is his third admission in the past month; presented with abdominal distention and shortness of breath.     1.  Cirrhosis, decompensated - SBP  MELD Na and MELD 3.0 are 39 and 40 (stable) -- suggests poor prognosis.   Maddrey DF is 69.  Would typically treat with steroids though have been holding of due to leukocytosis/infection.  Unlikely transplant candidate due to age and recent use of alcohol.  Complicated by:  1. Ascites (see below)  2. Coagulopathy - INR 2.06 (gradually increasing)  3. Portal HTN -- eso varices (large). S/P banding 7/3/25. Due for follow-up 8/8/25, planned as an outpt currently.  Splenomegaly.   4.  Hyperbilirubinemia - suspect combo of cirrhosis and EtOH hepatitis;  however, T bili usually peaks 1-2 weeks after onset of EtOH hepatitis.  Increase in bili likely due to underlying decompensation.  Last EtOH 7/1.  T bili overall stable.  5. Not a transplant candidate 2/2 to recalcitrant alcohol use and age  6. INR did not respond to IV Vit K 10 mg x2 doses  7. SBP- Repeat Para on 8/3/25 shows considerable improvement in neutrophil count, patient is responding to abx.  He's been getting regular albumin.  Ceftriaxone started 8/1.     No evidence of encephalopathy.  However, bowels were a bit sluggish-- he's getting lactulose 15mL daily since 8/1 and Rifaximin TID since 8/1.  Now having 3 Bms/day and no confusion.     2. Ascites  Had been on lasix 20mg daily, spironolactone 25mg daily- held on admission due to BRADLEY.  Renal managing fluids and diuretics.       3.  BRADLEY  Creatinine was normal through July 17.  Now 3.6.   HRS suspected, and appreciate management per renal.     4.  Hyponatremia  Fluid overload. Dehydration another factor.  Mental status appears at baseline.      5.  Anemia  No signs of overt GI bleeding.  Likely multifactorial. Stable.    Patient Active Problem List   Diagnosis    GI bleed    Secondary esophageal varices with bleeding (H)    ABLA (acute blood loss anemia)    Alcoholic cirrhosis of liver with ascites (H)    Alcoholism (H)    Lymphedema    Chronic systolic heart failure (H)    Loose stools    Restless legs syndrome (RLS)    Hyperbilirubinemia    Hyponatremia    Jaundice    Ascites due to alcoholic cirrhosis (H)    Leukocytosis, unspecified type    BRADLEY (acute kidney injury)    End stage liver disease (H)    Chronic diastolic heart failure (H)    Portal hypertension (H)    Secondary esophageal varices without bleeding (H)    Metabolic acidemia    Abdominal distension        Plan:    1. Complete 7 days of antibiotics (end 8/7) followed by daily cipro 500 mg for SBP prophylaxis.  2.  Daily MELD.  3.  Continue lactulose and Xifaxan.  Goal of 3 Bms daily.  4.   Outpatient Hep follow up and EGD as planned.  5.  Fluids and diuretics per renal.  6.  Continue low NA diet.  7.  Encourage long term sobriety.  8.  GI following.    20 minutes of total time was spent providing patient care, including patient evaluation, reviewing documentation/test results and .                                                Kimberly Tiwari PA-C  Thank you for the opportunity to participate in the care of this patient.   Please feel free to call me with any questions or concerns.  Phone number [phone].

## 2025-08-05 NOTE — PROGRESS NOTES
Care Management Follow Up    Length of Stay (days): 5    Expected Discharge Date: 08/08/2025    Anticipated Discharge Plan:       Transportation: Anticipate Family/friend    PT Recommendations: home with assist, home with home care physical therapy  OT Recommendations:        Barriers to Discharge: medical stability    Prior Living Situation: house with spouse    Discussed  Partnership in Safe Discharge Planning  document with patient/family: No     Handoff Completed: No, handoff not indicated or clinically appropriate    Patient/Spokesperson Updated: No    Additional Information:  Chart reviewed. Neph and GI following. Anticipate home when medically ready     Next Steps: continue to follow     Alexandra Horta RN  Acute Care Management  [facility]  For further questions/concerns you can reach us at Vocera Web Console

## 2025-08-05 NOTE — PROGRESS NOTES
RENAL PROGRESS NOTE    ASSESSMENT & PLAN:   ARF: baseline normal renal fxn, bland UA last month and previously normal renal anatomy on imaging. Creat 0.8 7/17/25.   Non oliguric BRADLEY, severe volume overload, soft BP related to liver disease. Suspect HRS BRADLEY but also acute infx / SBP, tense ascites and HFmrHF contributing hemodynamic factors.   (UA bland, FENA and Kristie low c/w HRS BRADLEY/ prerenal physiology (CRS/ infx))  Now on albumin, Midodrine and octreotide.  Creatinine trending slowly better, diuresing adequately    Recs:  - Cont midodrine 10 mg TID and octreotide  - continue alb 12.5 mg BID last dose tomorrow AM  - cont IV Bumex 2 mg q12, will keep on IV for now, has sig volume to diuresis still   - has resumed spironolactone as well   - ensure max protein BID  - daily RFP     ESLD: acute decompensation with SBP  Chronic due to ETOH, reports abstinent since July 1, not candidate for Tx eval.  Severe portal HTN, ascites and varices/ coagulopathy. Bili better  No severe encephalopathy or other new complications.      SBP: on ceftriaxone, bcx and ascites cx neg so far. Repeat Diag para with improvement in cell counts     Severe hyponatremia: mild in past and now severe due to ADH activation and ARF. Trending better with diuresis / alb at appropriate rate. Can follow daily for now     Hypokalemia: In setting of diuresis, replacing, addend back aldactone      Lactic acidosis: improved     Soft BP: on midodrine 10 mg tid     HFmrEF, TR MR and mild AoS and diastolic dysfxn: suspect cirrhotic CM     Anemia mild: no reported GI bld sx.      Coagulopathy: INR 1.9-2.0, didn't respond to VitK    SUBJECTIVE:    Friend visiting   Long discussion reviewing etiology of ARF, ongoing mgmt and recs above  Good UOP  Still very edematous  Appetite low    OBJECTIVE:  Physical Exam   Temp: 97.7  F (36.5  C) Temp src: Oral BP: 108/57 Pulse: 71   Resp: 18 SpO2: 98 % O2 Device: None (Room air)    Vitals:    08/01/25 0634 08/01/25 1609  08/02/25 0552   Weight: 101.6 kg (223 lb 14.4 oz) 108.6 kg (239 lb 6.4 oz) 106 kg (233 lb 11 oz)     Vital Signs with Ranges  Temp:  [97.4  F (36.3  C)-97.9  F (36.6  C)] 97.7  F (36.5  C)  Pulse:  [62-75] 71  Resp:  [18] 18  BP: ()/(50-59) 108/57  SpO2:  [96 %-98 %] 98 %  I/O last 3 completed shifts:  In: 1637 [P.O.:1637]  Out: 2125 [Urine:2125]    No data found.  Intake/Output Summary (Last 24 hours) at 8/5/2025 1332  Last data filed at 8/5/2025 1025  Gross per 24 hour   Intake 1237 ml   Output 2025 ml   Net -788 ml       PHYSICAL EXAM:  General - A & O x 3, NAD jaundice   HEENT - +icteric sclerae   Respiratory - Lungs CTA bilat   Cardiovascular - AP RRR with murmur all precordium  Abdomen - mildly distended   Extremities - well perfused, 2+ pitting now into calves  Neurologic - grossly intact no myoclonus  Psych:  Judgement intact, affect WNL  :  madison with bhargavi urine    LABORATORY STUDIES:     Recent Labs   Lab 08/05/25  0526 08/03/25  0636 08/02/25  0606 08/01/25  0656 07/31/25  2215   WBC 10.8 8.9 11.7* 13.3* 15.3*   RBC 3.10* 2.83* 3.06* 3.44* 3.58*   HGB 10.6* 9.6* 10.4* 11.6* 12.1*   HCT 29.0* 26.3* 28.4* 32.7* 33.9*    158 169 240 277       Basic Metabolic Panel:  Recent Labs   Lab 08/05/25  0526 08/04/25 2202 08/04/25 2118 08/04/25  1449 08/04/25  0542 08/03/25  2132 08/03/25  1408 08/03/25  0946 08/03/25  0636 08/02/25  1838 08/02/25  1510 08/02/25  0939 08/02/25  0606 08/01/25  1419 08/01/25  0656 08/01/25  0152 07/31/25  2215   * 127*  --  127* 126*  126* 123* 121*   < > 125*  125*   < > 121*  121*   < > 118*   < > 117*   < > 114*   POTASSIUM 3.5  3.5 3.3*  --   --  3.2*  --   --   --  3.8  --  3.7  --  4.0  --  4.7  --  5.3   CHLORIDE 88*  --   --   --  89*  --   --   --  88*  --  88*  --  85*  --  86*  --  86*   CO2 23  --   --   --  22  --   --   --  20*  --  17*  --  18*  --  15*  --  14*   BUN 82.5*  --   --   --  79.6*  --   --   --  80.0*  --   --   --  79.2*  --   76.8*  --  76.1*   CR 3.59*  --   --   --  3.67*  --   --   --  3.91*  --   --   --  4.04*  --  3.77*  --  3.89*   *  --  136*  --  162*  --   --   --  122*  --   --   --  127*  --  158*  --  190*   ANDRES 9.2  --   --   --  9.0  --   --   --  8.5*  --   --   --  8.1*  --  8.5*  --  8.4*    < > = values in this interval not displayed.       INR  Recent Labs   Lab 08/05/25  0526 08/03/25  0636 08/01/25  0656 07/31/25  2215   INR 2.06* 2.01* 1.92* 1.91*        Recent Labs   Lab Test 08/05/25 0526 08/03/25  0636   INR 2.06* 2.01*   WBC 10.8 8.9   HGB 10.6* 9.6*    158       Personally reviewed current labs    This note was dictated using voice recognition     Cornelio Irving PA-C  [facility]  [phone]

## 2025-08-05 NOTE — PLAN OF CARE
Goal Outcome Evaluation:       Denied any pain. Up in the chair. Erazo catheter patent and draining. Vitals stable, except blood pressure, provide aware, receiving scheduled midodrine.

## 2025-08-05 NOTE — PROGRESS NOTES
[facility]    Medicine Progress Note - Hospitalist Service    Date of Admission:  7/31/2025    Assessment & Plan      Malcom Chowdhury is a 75 year old male admitted on 7/31/2025.  He has history of alcoholism, mood disorder, recently hospitalized 7/2-7/6, 7/16-7/17 found to have new diagnosis of bleeding esophageal varices and alcoholic cirrhosis, was on lasix /aldactone now with increasing shortness of breath and progressive abdominal distension. Pt has history of needing paracentesis with last reported tap 7/16-Paracentesis was without evidence of SBP. Pt also is markedly jaundiced. Pt reports abdominal pain 3/10.  He reports that this has been progressively worsening and feels similar to what he experienced prior to previous paracentesis.  He reports that he has an EGD scheduled for 8/8 but he does not have any repeat paracentesis scheduled.  He has not had much abdominal pain but has had some diarrhea.  He denies chest pain, fever, nausea, vomiting, or urinary symptoms.  He reports that he has been taking all of his medications as prescribed. Admitted for severe hyponatremia, BRADLEY, severe sepsis 2/2 sbp  -- S/p paracentesis on 08/01 and 08/03.   -- On ceftriaxone for SBP  -- Cultures NGTD  -- Nephrology following for BRADLEY and hyponatremia  -- GI following  -- Dispo: likely 2-4 days of stay        8/4 :       Sodium 122--127  Creatinine 4--3.67  Nephrology following    No fevers  Follow cultures  On iv ceftriaxone    On iv albumin, iv bumex, spironolactone  Lactulose, midodrine, rifaximin  Coagulopathy with INR of 2.01            8/5 :       Sodium 122--127--128  Creatinine 4--3.67--3.59, urine output 2 liters  Nephrology following  On iv albumin, iv bumex, spironolactone, octreotide drip    No fevers  Follow cultures  On iv ceftriaxone    On iv albumin, iv bumex, spironolactone  Lactulose, midodrine, rifaximin  Coagulopathy with INR of 2.06      Not medically ready for discharge at this  time.  Multi day stay  BRADLEY, on iv diuretics      A/p :       #Hyponatremia, severe  #AGMA  #BRADLEY-prerenal, ?Hepatorenal syndrome  -- Suspect hyponatremia to be multifactorial: dilutional, dehydration, Pseudohyponatremia 2/2 hyperbilirubinemia.   -- No AMS  -- CT-a/p: cirrhosis, large volume ascites, portal hypertension, mild splenomegaly and intraabdominal venous collaterals. Normal kidney and bladder.  -- Monitor BMP trend  -- Avoid nephrotoxic meds  -- Nephrology consult appreciated: Albumin gtt q12, octreotide, midodrine, bumex q12h, spiranolactone  -- S/p bicarb- sodium q4 hrs. Na: 114-->117-->119--->124  -- Check serum osm, lipid panel- for pseudohyponatremia    #Severe sepsis with bradley, lactic acidosis  #SBP  #Lactic acidosis   -- SIRS (tachypnea, leukocytosis)   -- Bcx NGTD  -- IV antibiotics- empirically rocephin to cover broad-spectrum coverage for gram-positive negatives and anaerobes while  culture and sensitivities are pending  -- S/p Paracentesis with albumin on 08/01. 6.4L of hazy bhargavi colored fluid removed. Ascitic fluid analysis consistent with SBP. Culture pending  -- S/p Paracentesis on 08/03. 1.7 L of bhargavi colored fluid removed. Ascitic fluid analysis sent. Cell count went down significantly.    #Decompensated  alcoholic cirrhosis of liver with ascites   #Hyperbilirubinemia, Cholestatic LFTs  #Alcoholic hepatitis  -- Presented with severe jaundice, total bili up to 26-->28.2  MELD 3.0: 40 at 8/5/2025  5:26 AM  MELD-Na: 39 at 8/5/2025  5:26 AM  Calculated from:  Serum Creatinine: 3.59 mg/dL (Using max of 3 mg/dL) at 8/5/2025  5:26 AM  Serum Sodium: 128 mmol/L at 8/5/2025  5:26 AM  Total Bilirubin: 24.3 mg/dL at 8/5/2025  5:26 AM  Serum Albumin: 3.4 g/dL at 8/5/2025  5:26 AM  INR(ratio): 2.06 at 8/5/2025  5:26 AM  Age at listing (hypothetical): 75 years  Sex: Male at 8/5/2025  5:26 AM     -- Miki DF: 60's. Holding steroids due to SBP-defer to GI  -- Prognosis is worrisome  -- GI consult  appreciated: Vitamin k 10mg iv. Begin lactulose once daily. Hattie shelley EGD for esophageal varices on 08/08/25    #Portal hypertension  #Large volume ascites due to above  -- Recently started on Lasix and spironolactone to control ascites   -- Currently on Bumex and sprinolactone (started on 08/03)   -- Paracentesis asa above     #Esophageal varices, recent banding 7/3  -- No signs of ongoing bleeding  -- Hgb stable  than at recent discharge  -- Scheduled for followup EGD 8/8     #Alcoholism  -- last EtOH use ~7/1 denies any active drinking  -- States sobriety going well. He had one chem dep counseling session but did not like his counselor.    #RLS  -- Started on gabapentin during last hospital stay with much benefit  PRN dosing     #Peripheral edema likely due to anasarca and liver cirrhosis and hyperammonemia  -- Continue his home compression stockings    Chronic anemia, mild  -- Monitor. No e/o bleeding       Chronic stable issues:     # Recent diagnosis of HFpEF  -- Recent Echocardiogram noting a mildly reduced LVEF 50 to 55%, with no prior echocardiogram available for review. Elevated LVFP  -- Suspect possibly myocarditis due to alcoholism.   -- Recommend follow up TTE in 1-2 months while sober. If remains abnormal, needs to see cards for evaluation    #Heart murmur  #Mild-moderate aortic stenosis  -followup echo as above     # 9mm liver nodule  -- MRI showed 9 mm hyperenhancing nodule in segment 4A of the liver  -- Radiologist recommends follow-up MRI in 3 to 6 months              Diet: 2 Gram Sodium Diet  Fluid restriction 1200 ML FLUID  Snacks/Supplements Adult: Ensure Plus High Protein; Between Meals  Snacks/Supplements Adult: Special K Bar; Between Meals    DVT Prophylaxis: Pneumatic Compression Devices  Erazo Catheter: PRESENT, indication: Other (Comment)  Lines: None     Cardiac Monitoring: ACTIVE order. Indication: QTc prolonging medication (48 hours)  Code Status: Full Code      Clinically Significant  Risk Factors        # Hypokalemia: Lowest K = 3.2 mmol/L in last 2 days, will replace as needed  # Hyponatremia: Lowest Na = 123 mmol/L in last 2 days, will monitor as appropriate  # Hypochloremia: Lowest Cl = 88 mmol/L in last 2 days, will monitor as appropriate      # Hypoalbuminemia: Lowest albumin = 2.5 g/dL at 7/31/2025 10:15 PM, will monitor as appropriate    # Coagulation Defect: INR = 2.06 (Ref range: 0.85 - 1.15) and/or PTT = 42 Seconds (Ref range: 22 - 38 Seconds), will monitor for bleeding   # Acute Kidney Injury, unspecified: based on a >150% or 0.3 mg/dL increase in last creatinine compared to past 90 day average, will monitor renal function              # Severe Malnutrition: based on nutrition assessment and treatment provided per dietitian's recommendations.      # Financial/Environmental Concerns:           Social Drivers of Health    Tobacco Use: Unknown (7/16/2025)    Patient History     Smoking Tobacco Use: Never     Smokeless Tobacco Use: Unknown          Disposition Plan     Medically Ready for Discharge: Anticipated in 2-4 Days             Tip Partida MD  Hospitalist Service  [facility]  Securely message with [employer] (more info)  Text page via [employer] Paging/Directory   ______________________________________________________________________      Physical Exam   Vital Signs: Temp: 97.7  F (36.5  C) Temp src: Oral BP: 108/57 Pulse: 71   Resp: 18 SpO2: 98 % O2 Device: None (Room air)    Weight: 233 lbs 11 oz    GEN: Alert and oriented. Not in acute distress.  HEENT: Atraumatic, mucous membrane- moist and pink. Icteric sclerae.  Chest: Decreased bilateral air entry basally.  CVS: S1S2 regular.   Abdomen: Distended, non-tender. No guarding or rigidity. Bowel sounds active.   Extremities: b/l LE edmea. Anasarca  CNS: No involuntary movements.  Skin: no cyanosis or clubbing.  Jaundice    Medical Decision Making       52 MINUTES SPENT BY ME on the date of service doing chart  review, history, exam, documentation & further activities per the note.      Data

## 2025-08-05 NOTE — PLAN OF CARE
Pt is A&Ox4, calm, cooperative  - BP dropped to 90/51 this AM.        -> MD notified, orders for additional bag of albumin, given this morning  - TELE: 1st degree AV block with long Qtc  - noted increasing edema to the feet/ankles/legs  - IV octreotide running at 10 ml/hr, IV antibiotics given   - complained of RLS, pt requested gabapentin, given at 0145  - potassium came back at 3.3 -> replaced, recheck this AM  - sodium 127, mag recheck   - pt was up and ambulating the halls with assist of +1 with walker  - madison present, patent        ============================================================  Goal Outcome Evaluation:      Plan of Care Reviewed With: patient    Overall Patient Progress: no change      Problem: Liver Failure  Goal: Optimal Pain Control, Comfort and Function  Intervention: Prevent or Manage Pain  Recent Flowsheet Documentation  Taken 8/5/2025 0140 by Crystal Morel RN  Pain Management Interventions:   medication (see MAR)   pillow support provided   rest   repositioned     Problem: Acute Kidney Injury/Impairment  Goal: Fluid and Electrolyte Balance  Intervention: Monitor and Manage Fluid and Electrolyte Balance  Recent Flowsheet Documentation  Taken 8/5/2025 0000 by Crysatl Morel, RN  Fluid/Electrolyte Management: fluids restricted       Problem: Liver Failure  Goal: Effective Oxygenation and Ventilation  Outcome: Progressing  Intervention: Optimize Oxygenation and Ventilation  Recent Flowsheet Documentation  Taken 8/5/2025 0000 by Crystal Morel, RN  Head of Bed (HOB) Positioning: HOB at 20-30 degrees

## 2025-08-05 NOTE — PROGRESS NOTES
CLINICAL NUTRITION SERVICES - REASSESSMENT NOTE     RECOMMENDATIONS FOR MDs/PROVIDERS TO ORDER:  None    Registered Dietitian Interventions:  - Encouraged small frequent high protein low sodium foods. Reviewed protein foods  - Increase special k bar to tid to help meet nutrition needs + ensure daily = 890 kcal, 56 g protein/day    Future/Additional Recommendations:  Adjust supplements pending intake, weight tolerance, acceptance, labs, GOC       INFORMATION OBTAINED  Assessed patient in room.Pt reports his appetite has significantly improved here compared to at home. He loves the supplements and is willing to take more. He states he continues to be limited to amount he can eat  Pt states he was told by MD he needed to eat at midnight in order to keep the liver processing    Nutrition Hx  Pt reports eating poorly at home since March, <50%. Not taking any supplements at home. He reports he had no desire to eat. He does read nutrition labels to limit his Na    CURRENT NUTRITION ORDERS  Diet: Orders Placed This Encounter      2 Gram Sodium Diet   1200 ml fluid restriction    Snacks/Supplements:   Ensure enlive daily  and special k bar bid= 710 kcal, 44 g protein      CURRENT INTAKE/TOLERANCE  Missing intake documentation  Ordering 1-2 meal/day.Does not like the food much here but loves the supplements. Has fruit and broccoli from home at bedside    Taking 100% of supplements.   Estimate intake 1048-3289 kcal, 54-78 g protein/day meeting 85% of estimated kcal, 55-80% of estimated protein needs     NEW FINDINGS  GI symptoms:   3 soft/loose bm/day on lactulose-at goal   Skin/wounds:   No concerns   Nutrition-relevant labs:   Na 128 (L), improving  K+ 3.5 Wnl,improved  Bun 82.5 (H), increased  Cr 3.5 (H), improved  LFT more elevated  Tbili 24.3 (H), increased  Nutrition-relevant medications:   Iv albumin today, iv bumex bid, iv abx, lactulose dialy, mvi wth minerals, kcl today, aldactone daily, thiamine, octreotide 50  ml/hr  Weight: last paracentesis 8/3  Date/Time Weight Weight Method   08/02/25 0552 106 kg (233 lb 11 oz) Bed scale   08/01/25 1609 108.6 kg (239 lb 6.4 oz) Standing scale   08/01/25 0634 101.6 kg (223 lb 14.4 oz) Standing scale     Dosing Weight: 80 kg, based on adjusted wt     ASSESSED NUTRITION NEEDS  Estimated Energy Needs: 7236-0269 kcals/day (20 - 25 kcals/kg)  Justification: Obese  Estimated Protein Needs:  grams protein/day (1.2 - 1.5 grams of pro/kg)  Justification: Increased needs with cirrhosis  Estimated Fluid Needs: <1200 mL/day   Justification: On a fluid restriction    MALNUTRITION  % Intake: <75% > 1 month (severe)  % Weight Loss: > 5% in 1 month (severe)   Subcutaneous Fat Loss: Orbital: moderate  Muscle Loss: Tricep moderate; deltoid moderate los, clavicle mild loss  Fluid Accumulation/Edema: Severe, 4+  Malnutrition Diagnosis: Severe malnutrition in the context of chronic illness or injury  Malnutrition Present on Admission: Yes    EVALUATION OF THE PROGRESS TOWARD GOALS     NUTRITION DIAGNOSIS  Malnutrition (undernutrition) related to liver cirrhosis, frequent paracentesis as evidenced by >5% wt loss in 1 month, 4+ edema in BLE, intake <50% for 4 months      GOALS  Total avg nutritional intake to meet a minimum of 20 kcal/kg and 1.2 g PRO/kg daily (per dosing wt 80 kg). - progressing     MONITORING/EVALUATION  Progress toward goals will be monitored and evaluated per policy.

## 2025-08-05 NOTE — PROGRESS NOTES
08/05/25 1036   Appointment Info   Signing Clinician's Name / Credentials (PT) Ayleen Yuan PT   Living Environment   People in Home spouse   Current Living Arrangements house   Home Accessibility stairs to enter home;stairs within home   Number of Stairs, Main Entrance 5   Stair Railings, Main Entrance railing on left side (ascending)   Number of Stairs, Within Home, Primary seven   Stair Railings, Within Home, Primary railing on left side (ascending)   Transportation Anticipated family or friend will provide   Living Environment Comments split-level home   Self-Care   Equipment Currently Used at Home none   Activity/Exercise/Self-Care Comment independent ADL/IADL's   General Information   Onset of Illness/Injury or Date of Surgery 07/31/25   Referring Physician Dr. Partida   Patient/Family Therapy Goals Statement (PT) walk   Pertinent History of Current Problem (include personal factors and/or comorbidities that impact the POC) ETOH cirrhosis, esophageal varices, hyponatremia, RLS, CHF; s/p paracentesis 8/1, 8/3   Existing Precautions/Restrictions fall   Cognition   Affect/Mental Status (Cognition) WFL   Orientation Status (Cognition) oriented x 4   Follows Commands (Cognition) WFL   Range of Motion (ROM)   Range of Motion ROM is WFL   Strength (Manual Muscle Testing)   Strength (Manual Muscle Testing) Deficits observed during functional mobility   Transfers   Transfers sit-stand transfer   Sit-Stand Transfer   Sit-Stand [place] (Transfers) supervision   Assistive Device (Sit-Stand Transfers) walker, front-wheeled   Gait/Stairs (Locomotion)   [place] Level (Gait) supervision   Assistive Device (Gait) walker, front-wheeled   Distance in Feet (Gait) 150   Pattern (Gait) step-through   Deviations/Abnormal Patterns (Gait) gait speed decreased;stride length decreased   Negotiation (Stairs) stairs independence;handrail location;number of steps;descending technique;ascending technique   [place] Level  (Stairs) supervision;nonverbal cues (demo/gesture);verbal cues   Handrail Location (Stairs) both sides   Number of Steps (Stairs) 4   Ascending Technique (Stairs) step-over-step   Descending Technique (Stairs) step-to-step   Clinical Impression   Criteria for Skilled Therapeutic Intervention Yes, treatment indicated   PT Diagnosis (PT) impaired functional mobility   Influenced by the following impairments decreased strength, balance   Functional limitations due to impairments gait, stairs   Clinical Presentation (PT Evaluation Complexity) stable   Clinical Presentation Rationale pt presents as medically diagnosed   Clinical Decision Making (Complexity) low complexity   Planned Therapy Interventions (PT) balance training;gait training;home exercise program;bed mobility training;neuromuscular re-education;patient/family education;stair training;strengthening;transfer training   Risk & Benefits of therapy have been explained evaluation/treatment results reviewed;patient;spouse/significant other   PT Total Evaluation Time   PT Eval, Low Complexity Minutes (14125) 15   Physical Therapy Goals   PT Frequency 6x/week   PT Predicted Duration/Target Date for Goal Attainment 08/12/25   PT Goals Transfers;Gait;Stairs   PT: Transfers Modified independent;Assistive device;Sit to/from stand   PT: Gait Modified independent;Assistive device;Greater than 200 feet   PT: Stairs Modified independent;7 stairs;Rail on left   Interventions   Interventions Quick Adds Gait Training   Gait Training   Gait Training Minutes (23552) 15   Symptoms Noted During/After Treatment (Gait Training) fatigue   Treatment Detail/Skilled Intervention gait training with FWW with recommendation for FWW for home use currently; stair training up/down 4 steps with L rail only for second trial with demonstration and cuing for safe technique   Distance in Feet 150   [place] Level (Gait Training) stand-by assist   Physical Assistance Level (Gait Training)  verbal cues;supervision   Weight Bearing (Gait Training) full weight-bearing   Assistive Device (Gait Training) rolling walker   Pattern Analysis (Gait Training) swing-through gait   Gait Analysis Deviations decreased step length   Impairments (Gait Analysis/Training) balance impaired;strength decreased   Stair Railings present on left side   Physical Assist/Nonphysical Assist (Stairs) verbal cues;nonverbal cues (demo/gestures);supervision   PT Discharge Planning   PT Plan issue FWW for home; progress gait to SEC or no AD; stairs L rail up; Le ex   PT Discharge Recommendation (DC Rec) home with assist;home with home care physical therapy   PT Rationale for DC Rec continue with home PT for strengthening and home safety with mobility   PT Brief overview of current status amb. with walker 150 feet sba   PT Total Distance Amb During Session (feet) 300   PT Equipment Needed at Discharge walker, rolling  (needs at d/c)   Physical Therapy Time and Intention   Timed Code Treatment Minutes 15   Total Session Time (sum of timed and untimed services) 30

## 2025-08-06 ENCOUNTER — APPOINTMENT (OUTPATIENT)
Dept: PHYSICAL THERAPY | Facility: HOSPITAL | Age: 75
DRG: 432 | End: 2025-08-06
Attending: INTERNAL MEDICINE
Payer: COMMERCIAL

## 2025-08-06 ENCOUNTER — APPOINTMENT (OUTPATIENT)
Dept: OCCUPATIONAL THERAPY | Facility: HOSPITAL | Age: 75
DRG: 432 | End: 2025-08-06
Attending: INTERNAL MEDICINE
Payer: COMMERCIAL

## 2025-08-06 LAB
ALBUMIN SERPL BCG-MCNC: 3.6 G/DL (ref 3.5–5.2)
ALP SERPL-CCNC: 156 U/L (ref 40–150)
ALT SERPL W P-5'-P-CCNC: 84 U/L (ref 0–70)
ANION GAP SERPL CALCULATED.3IONS-SCNC: 18 MMOL/L (ref 7–15)
AST SERPL W P-5'-P-CCNC: 143 U/L (ref 0–45)
BACTERIA FLD CULT: NO GROWTH
BACTERIA SPEC CULT: NO GROWTH
BACTERIA SPEC CULT: NO GROWTH
BILIRUB DIRECT SERPL-MCNC: 13.58 MG/DL (ref 0–0.3)
BILIRUB SERPL-MCNC: 22.8 MG/DL
BUN SERPL-MCNC: 84.1 MG/DL (ref 8–23)
CALCIUM SERPL-MCNC: 9.4 MG/DL (ref 8.8–10.4)
CHLORIDE SERPL-SCNC: 89 MMOL/L (ref 98–107)
CREAT SERPL-MCNC: 4.01 MG/DL (ref 0.67–1.17)
EGFRCR SERPLBLD CKD-EPI 2021: 15 ML/MIN/1.73M2
GLUCOSE SERPL-MCNC: 180 MG/DL (ref 70–99)
GRAM STAIN RESULT: NORMAL
GRAM STAIN RESULT: NORMAL
HCO3 SERPL-SCNC: 21 MMOL/L (ref 22–29)
HGB BLD-MCNC: 10.6 G/DL (ref 13.3–17.7)
HOLD SPECIMEN: NORMAL
MAGNESIUM SERPL-MCNC: 2 MG/DL (ref 1.7–2.3)
MCV RBC AUTO: 96 FL (ref 78–100)
POTASSIUM SERPL-SCNC: 3.5 MMOL/L (ref 3.4–5.3)
PROT SERPL-MCNC: 6.2 G/DL (ref 6.4–8.3)
SODIUM SERPL-SCNC: 128 MMOL/L (ref 135–145)

## 2025-08-06 PROCEDURE — 250N000011 HC RX IP 250 OP 636: Performed by: INTERNAL MEDICINE

## 2025-08-06 PROCEDURE — 97116 GAIT TRAINING THERAPY: CPT | Mod: GP

## 2025-08-06 PROCEDURE — 258N000003 HC RX IP 258 OP 636: Performed by: INTERNAL MEDICINE

## 2025-08-06 PROCEDURE — 250N000012 HC RX MED GY IP 250 OP 636 PS 637: Mod: JA | Performed by: INTERNAL MEDICINE

## 2025-08-06 PROCEDURE — 99233 SBSQ HOSP IP/OBS HIGH 50: CPT | Performed by: INTERNAL MEDICINE

## 2025-08-06 PROCEDURE — 83735 ASSAY OF MAGNESIUM: CPT | Performed by: INTERNAL MEDICINE

## 2025-08-06 PROCEDURE — 99232 SBSQ HOSP IP/OBS MODERATE 35: CPT | Performed by: PHYSICIAN ASSISTANT

## 2025-08-06 PROCEDURE — 250N000013 HC RX MED GY IP 250 OP 250 PS 637: Performed by: PHYSICIAN ASSISTANT

## 2025-08-06 PROCEDURE — 85018 HEMOGLOBIN: CPT | Performed by: PHYSICIAN ASSISTANT

## 2025-08-06 PROCEDURE — 120N000001 HC R&B MED SURG/OB

## 2025-08-06 PROCEDURE — 97535 SELF CARE MNGMENT TRAINING: CPT | Mod: GO

## 2025-08-06 PROCEDURE — 82040 ASSAY OF SERUM ALBUMIN: CPT | Performed by: PHYSICIAN ASSISTANT

## 2025-08-06 PROCEDURE — 250N000013 HC RX MED GY IP 250 OP 250 PS 637: Performed by: HOSPITALIST

## 2025-08-06 PROCEDURE — P9047 ALBUMIN (HUMAN), 25%, 50ML: HCPCS | Performed by: PHYSICIAN ASSISTANT

## 2025-08-06 PROCEDURE — 250N000013 HC RX MED GY IP 250 OP 250 PS 637: Performed by: INTERNAL MEDICINE

## 2025-08-06 PROCEDURE — 97530 THERAPEUTIC ACTIVITIES: CPT | Mod: GP

## 2025-08-06 PROCEDURE — 36415 COLL VENOUS BLD VENIPUNCTURE: CPT | Performed by: INTERNAL MEDICINE

## 2025-08-06 PROCEDURE — 250N000011 HC RX IP 250 OP 636: Mod: JZ | Performed by: INTERNAL MEDICINE

## 2025-08-06 PROCEDURE — 97110 THERAPEUTIC EXERCISES: CPT | Mod: GO

## 2025-08-06 PROCEDURE — 82310 ASSAY OF CALCIUM: CPT | Performed by: INTERNAL MEDICINE

## 2025-08-06 PROCEDURE — 250N000011 HC RX IP 250 OP 636: Performed by: PHYSICIAN ASSISTANT

## 2025-08-06 RX ORDER — MIDODRINE HYDROCHLORIDE 5 MG/1
10 TABLET ORAL 4 TIMES DAILY
Status: DISCONTINUED | OUTPATIENT
Start: 2025-08-06 | End: 2025-08-09 | Stop reason: HOSPADM

## 2025-08-06 RX ORDER — TORSEMIDE 20 MG/1
40 TABLET ORAL DAILY
Status: DISCONTINUED | OUTPATIENT
Start: 2025-08-07 | End: 2025-08-08

## 2025-08-06 RX ADMIN — THIAMINE HCL TAB 100 MG 100 MG: 100 TAB at 08:33

## 2025-08-06 RX ADMIN — MIDODRINE HYDROCHLORIDE 10 MG: 5 TABLET ORAL at 17:46

## 2025-08-06 RX ADMIN — LACTULOSE SOLUTION USP, 10 G/15 ML 10 G: 10 SOLUTION ORAL; RECTAL at 08:33

## 2025-08-06 RX ADMIN — BUMETANIDE 2 MG: 0.25 INJECTION INTRAMUSCULAR; INTRAVENOUS at 08:33

## 2025-08-06 RX ADMIN — OCTREOTIDE ACETATE 50 MCG/HR: 500 INJECTION, SOLUTION INTRAVENOUS; SUBCUTANEOUS at 01:50

## 2025-08-06 RX ADMIN — RIFAXIMIN 550 MG: 550 TABLET ORAL at 13:00

## 2025-08-06 RX ADMIN — Medication 1 TABLET: at 08:34

## 2025-08-06 RX ADMIN — RIFAXIMIN 550 MG: 550 TABLET ORAL at 08:34

## 2025-08-06 RX ADMIN — MIDODRINE HYDROCHLORIDE 10 MG: 5 TABLET ORAL at 08:38

## 2025-08-06 RX ADMIN — SPIRONOLACTONE 25 MG: 25 TABLET, FILM COATED ORAL at 08:33

## 2025-08-06 RX ADMIN — RIFAXIMIN 550 MG: 550 TABLET ORAL at 20:31

## 2025-08-06 RX ADMIN — ALBUMIN HUMAN 12.5 G: 0.25 SOLUTION INTRAVENOUS at 08:33

## 2025-08-06 RX ADMIN — MIDODRINE HYDROCHLORIDE 10 MG: 5 TABLET ORAL at 20:31

## 2025-08-06 RX ADMIN — MIDODRINE HYDROCHLORIDE 10 MG: 5 TABLET ORAL at 13:25

## 2025-08-06 RX ADMIN — CEFTRIAXONE SODIUM 2 G: 2 INJECTION, POWDER, FOR SOLUTION INTRAMUSCULAR; INTRAVENOUS at 23:41

## 2025-08-06 ASSESSMENT — ACTIVITIES OF DAILY LIVING (ADL)
ADLS_ACUITY_SCORE: 43
ADLS_ACUITY_SCORE: 43
ADLS_ACUITY_SCORE: 46
ADLS_ACUITY_SCORE: 43
ADLS_ACUITY_SCORE: 46
ADLS_ACUITY_SCORE: 43
ADLS_ACUITY_SCORE: 46
ADLS_ACUITY_SCORE: 43
ADLS_ACUITY_SCORE: 46
ADLS_ACUITY_SCORE: 43
ADLS_ACUITY_SCORE: 46
ADLS_ACUITY_SCORE: 43
ADLS_ACUITY_SCORE: 46
ADLS_ACUITY_SCORE: 43
ADLS_ACUITY_SCORE: 43
ADLS_ACUITY_SCORE: 46

## 2025-08-06 NOTE — PROGRESS NOTES
[facility]    Medicine Progress Note - Hospitalist Service    Date of Admission:  7/31/2025    Assessment & Plan      Malcom Chowdhury is a 75 year old male admitted on 7/31/2025.  He has history of alcoholism, mood disorder, recently hospitalized 7/2-7/6, 7/16-7/17 found to have new diagnosis of bleeding esophageal varices and alcoholic cirrhosis, was on lasix /aldactone now with increasing shortness of breath and progressive abdominal distension. Pt has history of needing paracentesis with last reported tap 7/16-Paracentesis was without evidence of SBP. Pt also is markedly jaundiced. Pt reports abdominal pain 3/10.  He reports that this has been progressively worsening and feels similar to what he experienced prior to previous paracentesis.  He reports that he has an EGD scheduled for 8/8 but he does not have any repeat paracentesis scheduled.  He has not had much abdominal pain but has had some diarrhea.  He denies chest pain, fever, nausea, vomiting, or urinary symptoms.  He reports that he has been taking all of his medications as prescribed. Admitted for severe hyponatremia, BRADLEY, severe sepsis 2/2 sbp  -- S/p paracentesis on 08/01 and 08/03.   -- On ceftriaxone for SBP  -- Cultures NGTD  -- Nephrology following for BRADLEY and hyponatremia  -- GI following  -- Dispo: likely 2-4 days of stay        8/4 :       Sodium 122--127  Creatinine 4--3.67  Nephrology following    No fevers  Follow cultures  On iv ceftriaxone    On iv albumin, iv bumex, spironolactone  Lactulose, midodrine, rifaximin  Coagulopathy with INR of 2.01            8/5 :       Sodium 122--127--128  Creatinine 4--3.67--3.59, urine output 2 liters  Nephrology following  On iv albumin, iv bumex, spironolactone, octreotide drip    No fevers  Follow cultures  On iv ceftriaxone    On Lactulose, midodrine, rifaximin  Coagulopathy with INR of 2.06        8/6 :       Sodium 122--127--128--128  Creatinine 4--3.67--3.59--4.01, urine  output 2.3 liters  Nephrology following  On iv albumin, hold iv bumex, spironolactone today, started torsemide 40 mg daily, off octreotide drip    No fevers  Follow cultures - NGTD  On iv ceftriaxone    On iv albumin, iv bumex, spironolactone  Lactulose, midodrine, rifaximin  Coagulopathy with INR of 2.06      Not medically ready for discharge at this time.  Multi day stay  BRADLEY      A/p :       #Hyponatremia, severe  #AGMA  #BRADLEY-prerenal, ?Hepatorenal syndrome  -- Suspect hyponatremia to be multifactorial: dilutional, dehydration, Pseudohyponatremia 2/2 hyperbilirubinemia.   -- No AMS  -- CT-a/p: cirrhosis, large volume ascites, portal hypertension, mild splenomegaly and intraabdominal venous collaterals. Normal kidney and bladder.  -- Monitor BMP trend  -- Avoid nephrotoxic meds  -- Nephrology consult appreciated: Albumin gtt q12, octreotide, midodrine, bumex q12h, spiranolactone  -- S/p bicarb- sodium q4 hrs. Na: 114-->117-->119--->124  -- Check serum osm, lipid panel- for pseudohyponatremia    #Severe sepsis with bradley, lactic acidosis  #SBP  #Lactic acidosis   -- SIRS (tachypnea, leukocytosis)   -- Bcx NGTD  -- IV antibiotics- empirically rocephin to cover broad-spectrum coverage for gram-positive negatives and anaerobes while  culture and sensitivities are pending  -- S/p Paracentesis with albumin on 08/01. 6.4L of hazy bhargavi colored fluid removed. Ascitic fluid analysis consistent with SBP. Culture pending  -- S/p Paracentesis on 08/03. 1.7 L of bhargavi colored fluid removed. Ascitic fluid analysis sent. Cell count went down significantly.    #Decompensated  alcoholic cirrhosis of liver with ascites   #Hyperbilirubinemia, Cholestatic LFTs  #Alcoholic hepatitis  -- Presented with severe jaundice, total bili up to 26-->28.2  MELD 3.0: 40 at 8/6/2025  5:51 AM  MELD-Na: 40 at 8/6/2025  5:51 AM  Calculated from:  Serum Creatinine: 4.01 mg/dL (Using max of 3 mg/dL) at 8/6/2025  5:51 AM  Serum Sodium: 128 mmol/L at  8/6/2025  5:51 AM  Total Bilirubin: 22.8 mg/dL at 8/6/2025  5:51 AM  Serum Albumin: 3.6 g/dL (Using max of 3.5 g/dL) at 8/6/2025  5:51 AM  INR(ratio): 2.06 at 8/5/2025  5:26 AM  Age at listing (hypothetical): 75 years  Sex: Male at 8/6/2025  5:51 AM     -- Miki DF: 60's. Holding steroids due to SBP-defer to GI  -- Prognosis is worrisome  -- GI consult appreciated: Vitamin k 10mg iv. Begin lactulose once daily. Hattie shelley EGD for esophageal varices on 08/08/25    #Portal hypertension  #Large volume ascites due to above  -- Recently started on Lasix and spironolactone to control ascites   -- Currently on Bumex and sprinolactone (started on 08/03)   -- Paracentesis asa above     #Esophageal varices, recent banding 7/3  -- No signs of ongoing bleeding  -- Hgb stable  than at recent discharge  -- Scheduled for followup EGD 8/8     #Alcoholism  -- last EtOH use ~7/1 denies any active drinking  -- States sobriety going well. He had one chem dep counseling session but did not like his counselor.    #RLS  -- Started on gabapentin during last hospital stay with much benefit  PRN dosing     #Peripheral edema likely due to anasarca and liver cirrhosis and hyperammonemia  -- Continue his home compression stockings    Chronic anemia, mild  -- Monitor. No e/o bleeding       Chronic stable issues:     # Recent diagnosis of HFpEF  -- Recent Echocardiogram noting a mildly reduced LVEF 50 to 55%, with no prior echocardiogram available for review. Elevated LVFP  -- Suspect possibly myocarditis due to alcoholism.   -- Recommend follow up TTE in 1-2 months while sober. If remains abnormal, needs to see cards for evaluation    #Heart murmur  #Mild-moderate aortic stenosis  -followup echo as above     # 9mm liver nodule  -- MRI showed 9 mm hyperenhancing nodule in segment 4A of the liver  -- Radiologist recommends follow-up MRI in 3 to 6 months              Diet: 2 Gram Sodium Diet  Fluid restriction 1200 ML FLUID  Snacks/Supplements  Adult: Ensure Plus High Protein; Between Meals  Snacks/Supplements Adult: Special K Bar; Between Meals    DVT Prophylaxis: Pneumatic Compression Devices  Erazo Catheter: PRESENT, indication: Other (Comment) (output)  Lines: None     Cardiac Monitoring: ACTIVE order. Indication: QTc prolonging medication (48 hours)  Code Status: Full Code      Clinically Significant Risk Factors        # Hypokalemia: Lowest K = 3.3 mmol/L in last 2 days, will replace as needed  # Hyponatremia: Lowest Na = 127 mmol/L in last 2 days, will monitor as appropriate  # Hypochloremia: Lowest Cl = 88 mmol/L in last 2 days, will monitor as appropriate      # Hypoalbuminemia: Lowest albumin = 2.5 g/dL at 7/31/2025 10:15 PM, will monitor as appropriate    # Coagulation Defect: INR = 2.06 (Ref range: 0.85 - 1.15) and/or PTT = 42 Seconds (Ref range: 22 - 38 Seconds), will monitor for bleeding   # Acute Kidney Injury, unspecified: based on a >150% or 0.3 mg/dL increase in last creatinine compared to past 90 day average, will monitor renal function              # Severe Malnutrition: based on nutrition assessment and treatment provided per dietitian's recommendations.      # Financial/Environmental Concerns:           Social Drivers of Health    Tobacco Use: Unknown (7/16/2025)    Patient History     Smoking Tobacco Use: Never     Smokeless Tobacco Use: Unknown          Disposition Plan     Medically Ready for Discharge: Anticipated in 2-4 Days             Tip Partida MD  Hospitalist Service  [facility]  Securely message with [employer] (more info)  Text page via [employer] Paging/Directory   ______________________________________________________________________      Physical Exam   Vital Signs: Temp: 97.9  F (36.6  C) Temp src: Oral BP: 97/53 Pulse: 66   Resp: 20 SpO2: 98 % O2 Device: None (Room air)    Weight: 214 lbs 4.8 oz    GEN: Alert and oriented. Not in acute distress.  HEENT: Atraumatic, mucous membrane- moist and pink.  Icteric sclerae.  Chest: Decreased bilateral air entry basally.  CVS: S1S2 regular.   Abdomen: Distended, non-tender. No guarding or rigidity. Bowel sounds active.   Extremities: b/l LE edmea. Anasarca  CNS: No involuntary movements.  Skin: no cyanosis or clubbing.  Jaundice    Medical Decision Making       52 MINUTES SPENT BY ME on the date of service doing chart review, history, exam, documentation & further activities per the note.      Data

## 2025-08-06 NOTE — PLAN OF CARE
"  Problem: Adult Inpatient Plan of Care  Goal: Plan of Care Review  Description: The Plan of Care Review/Shift note should be completed every shift.  The Outcome Evaluation is a brief statement about your assessment that the patient is improving, declining, or no change.  This information will be displayed automatically on your shift  note.  Outcome: Progressing  Flowsheets (Taken 8/6/2025 1335)  Plan of Care Reviewed With: patient  Goal: Patient-Specific Goal (Individualized)  Description: You can add care plan individualizations to a care plan. Examples of Individualization might be:  \"Parent requests to be called daily at 9am for status\", \"I have a hard time hearing out of my right ear\", or \"Do not touch me to wake me up as it startles  me\".  Outcome: Progressing  Goal: Absence of Hospital-Acquired Illness or Injury  Outcome: Progressing  Intervention: Identify and Manage Fall Risk  Recent Flowsheet Documentation  Taken 8/6/2025 0900 by Vianey Elizabeth RN  Safety Promotion/Fall Prevention: activity supervised  Intervention: Prevent Infection  Recent Flowsheet Documentation  Taken 8/6/2025 0900 by Vianey Elizabeth RN  Infection Prevention: hand hygiene promoted  Goal: Optimal Comfort and Wellbeing  Outcome: Progressing  Intervention: Provide Person-Centered Care  Recent Flowsheet Documentation  Taken 8/6/2025 0900 by Vianey Elizabeth RN  Trust Relationship/Rapport:   care explained   choices provided   emotional support provided   empathic listening provided   questions answered   questions encouraged   reassurance provided   thoughts/feelings acknowledged  Goal: Readiness for Transition of Care  Outcome: Progressing     Problem: Delirium  Goal: Optimal Coping  Outcome: Progressing  Goal: Improved Behavioral Control  Outcome: Progressing  Intervention: Minimize Safety Risk  Recent Flowsheet Documentation  Taken 8/6/2025 0900 by Vianey Elizabeth RN  Trust Relationship/Rapport:   care explained   choices provided   emotional " support provided   empathic listening provided   questions answered   questions encouraged   reassurance provided   thoughts/feelings acknowledged  Goal: Improved Attention and Thought Clarity  Outcome: Progressing  Goal: Improved Sleep  Outcome: Progressing     Problem: Pain Acute  Goal: Optimal Pain Control and Function  Outcome: Progressing  Intervention: Prevent or Manage Pain  Recent Flowsheet Documentation  Taken 8/6/2025 0900 by Vianey Elizabeth RN  Medication Review/Management: medications reviewed     Problem: Gas Exchange Impaired  Goal: Optimal Gas Exchange  Outcome: Progressing     Problem: Liver Failure  Goal: Optimal Coping with Liver Failure  Outcome: Progressing  Goal: Absence of Bleeding  Outcome: Progressing  Goal: Fluid and Electrolyte Balance  Outcome: Progressing  Intervention: Monitor and Manage Fluid and Electrolyte Balance  Recent Flowsheet Documentation  Taken 8/6/2025 0900 by Vianey Elizabeth RN  Fluid/Electrolyte Management: fluids restricted  Goal: Optimal Gastrointestinal Function  Outcome: Progressing  Intervention: Monitor and Support Gastrointestinal Function  Recent Flowsheet Documentation  Taken 8/6/2025 0900 by Vianey Elizabeth RN  Fluid/Electrolyte Management: fluids restricted  Goal: Blood Glucose Level Within Target Range  Outcome: Progressing  Goal: Hemodynamic Stability  Outcome: Progressing  Goal: Absence of Infection Signs and Symptoms  Outcome: Progressing  Goal: Optimal Neurologic Function  Outcome: Progressing  Goal: Improved Oral Intake  Outcome: Progressing  Goal: Optimal Pain Control, Comfort and Function  Outcome: Progressing  Goal: Effective Oxygenation and Ventilation  Outcome: Progressing  Intervention: Promote Airway Secretion Clearance  Recent Flowsheet Documentation  Taken 8/6/2025 0900 by Vianey Elizabeth RN  Activity Management:   activity encouraged   activity adjusted per tolerance     Problem: Electrolyte Imbalance  Goal: Electrolyte Balance  Outcome:  Progressing  Intervention: Monitor and Manage Electrolyte Imbalance  Recent Flowsheet Documentation  Taken 8/6/2025 0900 by Vianey Elizabeth RN  Fluid/Electrolyte Management: fluids restricted     Problem: Acute Kidney Injury/Impairment  Goal: Fluid and Electrolyte Balance  Outcome: Progressing  Intervention: Monitor and Manage Fluid and Electrolyte Balance  Recent Flowsheet Documentation  Taken 8/6/2025 0900 by Vinaey Elizabeth RN  Fluid/Electrolyte Management: fluids restricted  Goal: Improved Oral Intake  Outcome: Progressing  Goal: Effective Renal Function  Outcome: Progressing  Intervention: Monitor and Support Renal Function  Recent Flowsheet Documentation  Taken 8/6/2025 0900 by Vianey Elizabeth RN  Medication Review/Management: medications reviewed     Problem: Comorbidity Management  Goal: Maintenance of Heart Failure Symptom Control  Outcome: Progressing  Intervention: Maintain Heart Failure Management  Recent Flowsheet Documentation  Taken 8/6/2025 0900 by Vianey Elizabeth RN  Medication Review/Management: medications reviewed   Goal Outcome Evaluation:      Plan of Care Reviewed With: patient             Pt alert and oriented. Flat affect. Denies pain. Jaundice. Erazo in place, bhargavi, clots, sediment noted. FR 1200. Lactulose given, x2 BM. Albumin given per order this AM. IV and PO lasix. Ax1 walker. Working with therapy. Stated he did stairs today. BLE edema +2-3. Encouraged Pt to elevate legs. Mg and K recheck AM.

## 2025-08-06 NOTE — PROGRESS NOTES
GI PROGRESS NOTE  8/6/2025  Malcom Chowdhury  1950  /-28    Subjective:  He feels well without complaints.     Objective:    Patient Vitals for the past 24 hrs:   BP Temp Temp src Pulse Resp SpO2 Weight   08/06/25 0859 -- -- -- -- -- -- 97.2 kg (214 lb 4.8 oz)   08/06/25 0811 99/55 97.4  F (36.3  C) Oral 75 18 96 % --   08/06/25 0516 100/55 98  F (36.7  C) Oral 78 18 96 % --   08/05/25 2348 96/53 97.2  F (36.2  C) Oral 77 16 97 % --   08/05/25 1550 92/51 98  F (36.7  C) Oral 76 18 98 % --   08/05/25 1019 108/57 -- -- 71 -- 98 % --     Body mass index is 31.63 kg/m .  Gen: NAD  Skin: Jaundice  GI: Non-distended, BS positive, soft, non-tender    Laboratory  Recent Labs   Lab Test 08/06/25  0551 08/05/25  0526 08/03/25  0636 08/02/25  0606 08/01/25  0656   WBC  --  10.8 8.9 11.7* 13.3*   HGB 10.6* 10.6* 9.6* 10.4* 11.6*   MCV 96 94 93 93 95   PLT  --  157 158 169 240   INR  --  2.06* 2.01*  --  1.92*     Recent Labs   Lab Test 08/06/25  0551 08/05/25  0526 08/04/25  2202 08/04/25  2118 08/04/25  1449 08/04/25  0542   * 128* 127*  --    < > 126*  126*   POTASSIUM 3.5 3.5  3.5 3.3*  --   --  3.2*   CHLORIDE 89* 88*  --   --   --  89*   CO2 21* 23  --   --   --  22   BUN 84.1* 82.5*  --   --   --  79.6*   CR 4.01* 3.59*  --   --   --  3.67*   ANIONGAP 18* 17*  --   --   --  15   ANDRES 9.4 9.2  --   --   --  9.0   * 177*  --  136*  --  162*    < > = values in this interval not displayed.     Recent Labs   Lab Test 08/06/25  0551 08/05/25  0526 08/03/25  0636 08/02/25  0606 08/01/25  1222 08/01/25  0656 07/31/25  2215 07/17/25  0703 07/16/25  0942 07/03/25  0635 07/02/25  1759   ALBUMIN 3.6 3.4* 3.5   < >  --    < > 2.5*   < > 3.0*   < >  --    BILITOTAL 22.8* 24.3* 23.6*   < >  --    < > 26.5*   < > 26.9*   < >  --    ALT 84* 81* 64   < >  --    < > 84*   < > 44   < >  --    * 128* 93*   < >  --    < > 135*   < > 114*   < >  --    ALKPHOS 156* 136 129   < >  --    < > 166*   < > 161*   < >   --    PROTEIN  --   --   --   --  Negative  --   --   --   --   --  Negative   LIPASE  --   --   --   --   --   --  212*  --  160*  --   --     < > = values in this interval not displayed.     MELD 3.0: 40 at 8/6/2025  5:51 AM  MELD-Na: 40 at 8/6/2025  5:51 AM  Calculated from:  Serum Creatinine: 4.01 mg/dL (Using max of 3 mg/dL) at 8/6/2025  5:51 AM  Serum Sodium: 128 mmol/L at 8/6/2025  5:51 AM  Total Bilirubin: 22.8 mg/dL at 8/6/2025  5:51 AM  Serum Albumin: 3.6 g/dL (Using max of 3.5 g/dL) at 8/6/2025  5:51 AM  INR(ratio): 2.06 at 8/5/2025  5:26 AM  Age at listing (hypothetical): 75 years  Sex: Male at 8/6/2025  5:51 AM    US Paracentesis without Albumin  Result Date: 8/3/2025- 1.7 liters removed  Absolute neutrophils- 1789     US Paracentesis with Albumin  Result Date: 8/1/2025- 6.4 liters removed  Absolute neutrophils- 15,156     Blood cultures 8/1 no growth  Ascites culture 8/1 and 8/3 no growth    Assessment:  He is a 75 year old with decompensated alcohol associated cirrhosis who has been sober since 7/1.  He is admitted with abdominal distention and SOB.     Cirrhosis- MELD 3.0 up to 40.  Not a transplant candidate due to recent use of etoh and his age.  Tbili is slowly improving.  No steroids due to SBP.  Getting low dose lactulose without signs of encephalopathy.  Ascites with SBP- will complete one week of antibiotics 8/7, and repeat tap 8/3 showed improved neutrophils.  On a low NA diet.  Renal managing fluids and diuretics.  Has been getting albumin.  BRADLEY with suspected HRS.  Appreciate renal's expertise.  Cr slightly up today.  Coagulopathy- INR slowly rising and did not respond to vit K.  Hx of esophageal varices- banded 7/3 and due for repeat EGD 8/8.  No signs of overt GI bleeding.    Patient Active Problem List   Diagnosis    GI bleed    Secondary esophageal varices with bleeding (H)    ABLA (acute blood loss anemia)    Alcoholic cirrhosis of liver with ascites (H)    Alcoholism (H)    Lymphedema     Chronic systolic heart failure (H)    Loose stools    Restless legs syndrome (RLS)    Hyperbilirubinemia    Hyponatremia    Jaundice    Ascites due to alcoholic cirrhosis (H)    Leukocytosis, unspecified type    BRADLEY (acute kidney injury)    End stage liver disease (H)    Chronic diastolic heart failure (H)    Portal hypertension (H)    Secondary esophageal varices without bleeding (H)    Metabolic acidemia    Abdominal distension        Plan:    1. Complete 7 days of antibiotics (end 8/7) followed by daily cipro 500 mg for SBP prophylaxis.  2.  Daily MELD.  3.  Continue lactulose and Xifaxan.  Goal of 3 Bms daily.  4.  Outpatient Hep follow up and EGD as planned.  5.  Fluids and diuretics per renal.  6.  Continue low NA diet.  7.  Encourage long term sobriety.  8.  GI following.    20 minutes of total time was spent providing patient care, including patient evaluation, reviewing documentation/test results and .                                                Kimberly Tiwari PA-C  Thank you for the opportunity to participate in the care of this patient.   Please feel free to call me with any questions or concerns.  Phone number [phone].

## 2025-08-06 NOTE — PROGRESS NOTES
RENAL PROGRESS NOTE    ASSESSMENT & PLAN:   ARF: baseline normal renal fxn, bland UA last month and previously normal renal anatomy on imaging. Creat 0.8 7/17/25.   Non oliguric BRADLEY, severe volume overload, soft BP related to liver disease. Suspect HRS BRADLEY but also acute infx / SBP, tense ascites and HFmrHF contributing hemodynamic factors.   (UA bland, FENA and Kristie low c/w HRS BRADLEY/ prerenal physiology (CRS/ infx))  HRS treated with IV albumin, midodrine, octreotide, diuretics  Recs:  Holding loop diuretic today with bump in creatinine  Holding Aldactone as well  Increasing midodrine to 10 mg 4 times daily  Discontinue octreotide IV albumin with serum albumin levels normalizing  Will try to convert to oral diuretics tomorrow if creatinine stable, try torsemide 40 mg daily  Does not like Ensure supplements ordered  FR, low-sodium diet     ESLD: acute decompensation with SBP  Chronic due to ETOH, reports abstinent since July 1, not candidate for Tx eval.  Severe portal HTN, ascites and varices/ coagulopathy. Bili better  No severe encephalopathy or other new complications.      SBP: on ceftriaxone, bcx and ascites cx neg so far. Repeat Diag para with improvement in cell counts     Severe hyponatremia: mild in past and now severe due to ADH activation and ARF. Trending better with diuresis / alb at appropriate rate. 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      Coagulopathy: INR 1.9-2.0, didn't respond to VitK        SUBJECTIVE:    Feels about the same  Disappointed that his creatinine bumped today  Reviewed plans and recommendations as above in detail  All questions answered  OBJECTIVE:  Physical Exam   Temp: 97.9  F (36.6  C) Temp src: Oral  BP: 97/53 Pulse: 66   Resp: 20 SpO2: 98 % O2 Device: None (Room air)    Vitals:    08/01/25 1609 08/02/25 0552 08/06/25 0859   Weight: 108.6 kg (239 lb 6.4 oz) 106 kg (233 lb 11 oz) 97.2 kg (214 lb 4.8 oz)     Vital Signs with Ranges  Temp:  [97.2  F (36.2  C)-98  F (36.7  C)] 97.9  F (36.6  C)  Pulse:  [66-78] 66  Resp:  [16-20] 20  BP: ()/(51-55) 97/53  SpO2:  [96 %-98 %] 98 %  I/O last 3 completed shifts:  In: 250 [P.O.:250]  Out: 2300 [Urine:2300]    Patient Vitals for the past 72 hrs:   Weight   08/06/25 0859 97.2 kg (214 lb 4.8 oz)     Intake/Output Summary (Last 24 hours) at 8/6/2025 1336  Last data filed at 8/6/2025 0853  Gross per 24 hour   Intake 600 ml   Output 1850 ml   Net -1250 ml       PHYSICAL EXAM:  General - A & O x 3, NAD jaundice   HEENT - +icteric sclerae   Respiratory - Lungs CTA bilat   Cardiovascular - AP RRR with murmur all precordium  Abdomen - mildly distended   Extremities - well perfused, 2+ pitting now into calves  Neurologic - grossly intact no myoclonus  Psych:  Judgement intact, affect WNL  :  madison with bhargavi urine    LABORATORY STUDIES:     Recent Labs   Lab 08/06/25  0551 08/05/25  0526 08/03/25  0636 08/02/25  0606 08/01/25  0656 07/31/25  2215   WBC  --  10.8 8.9 11.7* 13.3* 15.3*   RBC  --  3.10* 2.83* 3.06* 3.44* 3.58*   HGB 10.6* 10.6* 9.6* 10.4* 11.6* 12.1*   HCT  --  29.0* 26.3* 28.4* 32.7* 33.9*   PLT  --  157 158 169 240 277       Basic Metabolic Panel:  Recent Labs   Lab 08/06/25  0551 08/05/25  0526 08/04/25  2202 08/04/25  2118 08/04/25  1449 08/04/25  0542 08/03/25  2132 08/03/25  0946 08/03/25  0636 08/02/25  1838 08/02/25  1510 08/02/25  0939 08/02/25  0606 08/01/25  1419 08/01/25  0656   * 128* 127*  --  127* 126*  126* 123*   < > 125*  125*   < > 121*  121*   < > 118*   < > 117*   POTASSIUM 3.5 3.5  3.5 3.3*  --   --  3.2*  --   --  3.8  --  3.7  --  4.0  --  4.7   CHLORIDE 89* 88*  --   --   --  89*  --   --  88*  --  88*  --  85*  --  86*   CO2  21* 23  --   --   --  22  --   --  20*  --  17*  --  18*  --  15*   BUN 84.1* 82.5*  --   --   --  79.6*  --   --  80.0*  --   --   --  79.2*  --  76.8*   CR 4.01* 3.59*  --   --   --  3.67*  --   --  3.91*  --   --   --  4.04*  --  3.77*   * 177*  --  136*  --  162*  --   --  122*  --   --   --  127*  --  158*   ANDRES 9.4 9.2  --   --   --  9.0  --   --  8.5*  --   --   --  8.1*  --  8.5*    < > = values in this interval not displayed.       INR  Recent Labs   Lab 08/05/25  0526 08/03/25  0636 08/01/25  0656 07/31/25  2215   INR 2.06* 2.01* 1.92* 1.91*        Recent Labs   Lab Test 08/06/25  0551 08/05/25  0526 08/03/25  0636   INR  --  2.06* 2.01*   WBC  --  10.8 8.9   HGB 10.6* 10.6* 9.6*   PLT  --  157 158       Personally reviewed current labs    This note was dictated using voice recognition     Cornelio Irving PA-C  [facility]  [phone]

## 2025-08-06 NOTE — PLAN OF CARE
Problem: Adult Inpatient Plan of Care  Goal: Plan of Care Review  Description: The Plan of Care Review/Shift note should be completed every shift.  The Outcome Evaluation is a brief statement about your assessment that the patient is improving, declining, or no change.  This information will be displayed automatically on your shift  note.  Outcome: Progressing     Problem: Pain Acute  Goal: Optimal Pain Control and Function  Outcome: Progressing  Intervention: Prevent or Manage Pain  Recent Flowsheet Documentation  Taken 8/6/2025 0000 by Jg Brooks RN  Medication Review/Management: medications reviewed  Taken 8/5/2025 2000 by Jg Brooks RN  Medication Review/Management: medications reviewed     Problem: Gas Exchange Impaired  Goal: Optimal Gas Exchange  Outcome: Progressing  Intervention: Optimize Oxygenation and Ventilation  Recent Flowsheet Documentation  Taken 8/6/2025 0000 by Jg Brooks RN  Head of Bed (HOB) Positioning: HOB at 30-45 degrees  Taken 8/5/2025 2000 by Jg Brooks RN  Head of Bed (HOB) Positioning: HOB at 30-45 degrees     Problem: Liver Failure  Goal: Blood Glucose Level Within Target Range  Outcome: Progressing     Problem: Liver Failure  Goal: Absence of Infection Signs and Symptoms  Outcome: Progressing     Problem: Comorbidity Management  Goal: Maintenance of Heart Failure Symptom Control  Outcome: Progressing  Intervention: Maintain Heart Failure Management  Recent Flowsheet Documentation  Taken 8/6/2025 0000 by Jg Brooks RN  Medication Review/Management: medications reviewed  Taken 8/5/2025 2000 by Jg Brooks RN  Medication Review/Management: medications reviewed    Goal Outcome Evaluation:    Patient AOX4. Able to make needs known. VSS. Denies SOB, nausea and pain. Erazo in place with adequate urine output. Tele - Sinus with 1st degree AV block. Sleeping between cares.

## 2025-08-06 NOTE — PROGRESS NOTES
SPIRITUAL HEALTH SERVICES ([facility]) Progress Note  [facility].  [address]     Saw pt Malcom Chowdhury per length of stay and to assess emotional and spiritual well being. .     Patient/Family Understanding of Illness and Goals of Care - N/A     Distress and Loss - N/A     Strengths, Coping, and Resources - N/A      Meaning, Beliefs, and Spirituality - Malcom confirmed that he does not have a Mandaen nor does he wish to have a Spiritual Care visit.      Plan of Care - No plans to follow.        Shu Mason, Ph.D.,[facility]  , Spiritual Health Services      [facility] available 24/7 for emergency requests/referrals, either by having the on-call  paged or by entering an ASAP/STAT consult in Epic (this will also page the on-call ).

## 2025-08-07 ENCOUNTER — APPOINTMENT (OUTPATIENT)
Dept: OCCUPATIONAL THERAPY | Facility: HOSPITAL | Age: 75
DRG: 432 | End: 2025-08-07
Attending: INTERNAL MEDICINE
Payer: COMMERCIAL

## 2025-08-07 ENCOUNTER — APPOINTMENT (OUTPATIENT)
Dept: PHYSICAL THERAPY | Facility: HOSPITAL | Age: 75
DRG: 432 | End: 2025-08-07
Attending: INTERNAL MEDICINE
Payer: COMMERCIAL

## 2025-08-07 VITALS
TEMPERATURE: 97.7 F | BODY MASS INDEX: 31.5 KG/M2 | OXYGEN SATURATION: 98 % | DIASTOLIC BLOOD PRESSURE: 50 MMHG | HEART RATE: 65 BPM | WEIGHT: 213.4 LBS | SYSTOLIC BLOOD PRESSURE: 95 MMHG | RESPIRATION RATE: 18 BRPM

## 2025-08-07 LAB
ALBUMIN SERPL BCG-MCNC: 3.7 G/DL (ref 3.5–5.2)
ALBUMIN SERPL BCG-MCNC: 3.7 G/DL (ref 3.5–5.2)
ALP SERPL-CCNC: 184 U/L (ref 40–150)
ALT SERPL W P-5'-P-CCNC: 90 U/L (ref 0–70)
ANION GAP SERPL CALCULATED.3IONS-SCNC: 16 MMOL/L (ref 7–15)
AST SERPL W P-5'-P-CCNC: 142 U/L (ref 0–45)
BACTERIA FLD CULT: NORMAL
BILIRUB DIRECT SERPL-MCNC: 13.55 MG/DL (ref 0–0.3)
BILIRUB SERPL-MCNC: 21.9 MG/DL
BUN SERPL-MCNC: 85.2 MG/DL (ref 8–23)
CALCIUM SERPL-MCNC: 9.6 MG/DL (ref 8.8–10.4)
CHLORIDE SERPL-SCNC: 91 MMOL/L (ref 98–107)
CREAT SERPL-MCNC: 4.05 MG/DL (ref 0.67–1.17)
EGFRCR SERPLBLD CKD-EPI 2021: 15 ML/MIN/1.73M2
GLUCOSE SERPL-MCNC: 148 MG/DL (ref 70–99)
GRAM STAIN RESULT: NORMAL
GRAM STAIN RESULT: NORMAL
HCO3 SERPL-SCNC: 24 MMOL/L (ref 22–29)
HOLD SPECIMEN: NORMAL
MAGNESIUM SERPL-MCNC: 2 MG/DL (ref 1.7–2.3)
PHOSPHATE SERPL-MCNC: 4.8 MG/DL (ref 2.5–4.5)
POTASSIUM SERPL-SCNC: 3.5 MMOL/L (ref 3.4–5.3)
PROT SERPL-MCNC: 6.4 G/DL (ref 6.4–8.3)
SODIUM SERPL-SCNC: 131 MMOL/L (ref 135–145)

## 2025-08-07 PROCEDURE — 82248 BILIRUBIN DIRECT: CPT | Performed by: PHYSICIAN ASSISTANT

## 2025-08-07 PROCEDURE — 250N000013 HC RX MED GY IP 250 OP 250 PS 637: Performed by: INTERNAL MEDICINE

## 2025-08-07 PROCEDURE — 97535 SELF CARE MNGMENT TRAINING: CPT | Mod: GO

## 2025-08-07 PROCEDURE — 99232 SBSQ HOSP IP/OBS MODERATE 35: CPT | Performed by: INTERNAL MEDICINE

## 2025-08-07 PROCEDURE — 250N000013 HC RX MED GY IP 250 OP 250 PS 637: Performed by: PHYSICIAN ASSISTANT

## 2025-08-07 PROCEDURE — 36415 COLL VENOUS BLD VENIPUNCTURE: CPT | Performed by: INTERNAL MEDICINE

## 2025-08-07 PROCEDURE — 97116 GAIT TRAINING THERAPY: CPT | Mod: GP

## 2025-08-07 PROCEDURE — 120N000001 HC R&B MED SURG/OB

## 2025-08-07 PROCEDURE — 99232 SBSQ HOSP IP/OBS MODERATE 35: CPT | Performed by: PHYSICIAN ASSISTANT

## 2025-08-07 PROCEDURE — 97110 THERAPEUTIC EXERCISES: CPT | Mod: GO

## 2025-08-07 PROCEDURE — 97530 THERAPEUTIC ACTIVITIES: CPT | Mod: GP

## 2025-08-07 PROCEDURE — 250N000013 HC RX MED GY IP 250 OP 250 PS 637: Performed by: HOSPITALIST

## 2025-08-07 PROCEDURE — 82374 ASSAY BLOOD CARBON DIOXIDE: CPT | Performed by: INTERNAL MEDICINE

## 2025-08-07 PROCEDURE — 83735 ASSAY OF MAGNESIUM: CPT | Performed by: INTERNAL MEDICINE

## 2025-08-07 PROCEDURE — 97110 THERAPEUTIC EXERCISES: CPT | Mod: GP

## 2025-08-07 RX ADMIN — RIFAXIMIN 550 MG: 550 TABLET ORAL at 21:15

## 2025-08-07 RX ADMIN — Medication 1 TABLET: at 09:13

## 2025-08-07 RX ADMIN — MIDODRINE HYDROCHLORIDE 10 MG: 5 TABLET ORAL at 17:32

## 2025-08-07 RX ADMIN — RIFAXIMIN 550 MG: 550 TABLET ORAL at 13:34

## 2025-08-07 RX ADMIN — THIAMINE HCL TAB 100 MG 100 MG: 100 TAB at 09:13

## 2025-08-07 RX ADMIN — MIDODRINE HYDROCHLORIDE 10 MG: 5 TABLET ORAL at 09:13

## 2025-08-07 RX ADMIN — TORSEMIDE 40 MG: 20 TABLET ORAL at 09:13

## 2025-08-07 RX ADMIN — MIDODRINE HYDROCHLORIDE 10 MG: 5 TABLET ORAL at 13:35

## 2025-08-07 RX ADMIN — RIFAXIMIN 550 MG: 550 TABLET ORAL at 09:13

## 2025-08-07 RX ADMIN — LACTULOSE SOLUTION USP, 10 G/15 ML 10 G: 10 SOLUTION ORAL; RECTAL at 09:13

## 2025-08-07 RX ADMIN — MIDODRINE HYDROCHLORIDE 10 MG: 5 TABLET ORAL at 21:15

## 2025-08-07 ASSESSMENT — ACTIVITIES OF DAILY LIVING (ADL)
ADLS_ACUITY_SCORE: 46
ADLS_ACUITY_SCORE: 43
ADLS_ACUITY_SCORE: 46
ADLS_ACUITY_SCORE: 43
ADLS_ACUITY_SCORE: 46
ADLS_ACUITY_SCORE: 43
ADLS_ACUITY_SCORE: 46
ADLS_ACUITY_SCORE: 43
ADLS_ACUITY_SCORE: 46
ADLS_ACUITY_SCORE: 46
ADLS_ACUITY_SCORE: 43
ADLS_ACUITY_SCORE: 46
ADLS_ACUITY_SCORE: 46

## 2025-08-07 NOTE — PROGRESS NOTES
GI PROGRESS NOTE  8/7/2025  Malcom Noen  1950  /-14    Subjective:  He feels the same.  He asks about scheduling for routine EGD planned for tomorrow as an outpatient.     Objective:    Patient Vitals for the past 24 hrs:   BP Temp Temp src Pulse Resp SpO2   08/07/25 0749 104/53 97.7  F (36.5  C) Oral 65 18 96 %   08/07/25 0413 95/53 98.4  F (36.9  C) Oral 69 18 96 %   08/06/25 2351 101/54 98  F (36.7  C) Oral 71 18 97 %   08/06/25 1528 106/51 98.2  F (36.8  C) Oral 67 18 98 %   08/06/25 1248 97/53 97.9  F (36.6  C) Oral 66 20 98 %     Body mass index is 31.63 kg/m .  Gen: NAD  Skin: Jaundice  GI: Non-distended, BS positive, soft, non-tender    Laboratory  Recent Labs   Lab Test 08/06/25  0551 08/05/25  0526 08/03/25  0636 08/02/25  0606 08/01/25  0656   WBC  --  10.8 8.9 11.7* 13.3*   HGB 10.6* 10.6* 9.6* 10.4* 11.6*   MCV 96 94 93 93 95   PLT  --  157 158 169 240   INR  --  2.06* 2.01*  --  1.92*     Recent Labs   Lab Test 08/07/25  0603 08/06/25  0551 08/05/25  0526   * 128* 128*   POTASSIUM 3.5 3.5 3.5  3.5   CHLORIDE 91* 89* 88*   CO2 24 21* 23   BUN 85.2* 84.1* 82.5*   CR 4.05* 4.01* 3.59*   ANIONGAP 16* 18* 17*   ANDRES 9.6 9.4 9.2   * 180* 177*     Recent Labs   Lab Test 08/07/25  0603 08/06/25  0551 08/05/25  0526 08/02/25  0606 08/01/25  1222 08/01/25  0656 07/31/25  2215 07/17/25  0703 07/16/25  0942 07/03/25  0635 07/02/25  1759   ALBUMIN 3.7  3.7 3.6 3.4*   < >  --    < > 2.5*   < > 3.0*   < >  --    BILITOTAL 21.9* 22.8* 24.3*   < >  --    < > 26.5*   < > 26.9*   < >  --    ALT 90* 84* 81*   < >  --    < > 84*   < > 44   < >  --    * 143* 128*   < >  --    < > 135*   < > 114*   < >  --    ALKPHOS 184* 156* 136   < >  --    < > 166*   < > 161*   < >  --    PROTEIN  --   --   --   --  Negative  --   --   --   --   --  Negative   LIPASE  --   --   --   --   --   --  212*  --  160*  --   --     < > = values in this interval not displayed.     MELD 3.0: 39 at 8/7/2025   6:03 AM  MELD-Na: 40 at 8/7/2025  6:03 AM  Calculated from:  Serum Creatinine: 4.05 mg/dL (Using max of 3 mg/dL) at 8/7/2025  6:03 AM  Serum Sodium: 131 mmol/L at 8/7/2025  6:03 AM  Total Bilirubin: 21.9 mg/dL at 8/7/2025  6:03 AM  Serum Albumin: 3.7 g/dL (Using max of 3.5 g/dL) at 8/7/2025  6:03 AM  INR(ratio): 2.06 at 8/5/2025  5:26 AM  Age at listing (hypothetical): 75 years  Sex: Male at 8/7/2025  6:03 AM    US Paracentesis without Albumin  Result Date: 8/3/2025- 1.7 liters removed  Absolute neutrophils- 1789     US Paracentesis with Albumin  Result Date: 8/1/2025- 6.4 liters removed  Absolute neutrophils- 15,156     Blood cultures 8/1 no growth  Ascites culture 8/1 and 8/3 no growth     Assessment:  He is a 75 year old with decompensated alcohol associated cirrhosis who has been sober since 7/1.  He is admitted with abdominal distention and SOB.      Cirrhosis- MELD 3.0 is relatively stable at 39.  Not a transplant candidate due to recent use of etoh and his age.  Tbili is slowly improving.  No steroids due to SBP.  Getting low dose lactulose without signs of encephalopathy.  Ascites with SBP- will complete one week of antibiotics 8/7, and repeat tap 8/3 showed improved neutrophils.  On a low NA diet.  Renal managing fluids and diuretics.  Has been getting albumin and midodrine was increased yesterday.  Will order a repeat diagnostic tap for tomorrow to recheck neutrophils.  BRADLEY with suspected HRS.  Appreciate renal's expertise.  Cr high but stable.  Coagulopathy- INR slowly rising and did not respond to vit K.  Hx of esophageal varices- banded 7/3 and due for repeat EGD 8/8.  No signs of overt GI bleeding.  We'll cancel the outpatient EGD for tomorrow and reschedule for the near future.    Patient Active Problem List   Diagnosis    GI bleed    Secondary esophageal varices with bleeding (H)    ABLA (acute blood loss anemia)    Alcoholic cirrhosis of liver with ascites (H)    Alcoholism (H)    Lymphedema     Chronic systolic heart failure (H)    Loose stools    Restless legs syndrome (RLS)    Hyperbilirubinemia    Hyponatremia    Jaundice    Ascites due to alcoholic cirrhosis (H)    Leukocytosis, unspecified type    BRADLEY (acute kidney injury)    End stage liver disease (H)    Chronic diastolic heart failure (H)    Portal hypertension (H)    Secondary esophageal varices without bleeding (H)    Metabolic acidemia    Abdominal distension        Plan:    1. Complete 7 days of antibiotics (end 8/7) followed by daily cipro 500 mg for SBP prophylaxis.  2.  Plan repeat diagnostic paracentesis tomorrow to recheck neutrophils.  3.  Daily MELD labs.  4.  Continue lactulose and Xifaxan.  Goal of 3 Bms daily.  5.  Outpatient Hep follow up as planned 9/12, and we'll help to reschedule outpatient EGD for routine surveillance of varices.  6.  Fluids and diuretics per renal.  7.  Continue low NA diet.  8.  Encourage long term sobriety.  9.  GI following.    25 minutes of total time was spent providing patient care, including patient evaluation, reviewing documentation/test results and .                                                Kimberly Tiwari PA-C  Thank you for the opportunity to participate in the care of this patient.   Please feel free to call me with any questions or concerns.  Phone number [phone].

## 2025-08-07 NOTE — PROGRESS NOTES
RENAL PROGRESS NOTE    ASSESSMENT & PLAN:   ARF: Baseline normal renal fxn, bland UA last month and previously normal renal anatomy on imaging. Creat 0.8 7/17/25.   Non oliguric BRADLEY, severe volume overload, soft BP related to liver disease. Suspect HRS BRADLEY but also acute infx / SBP, tense ascites and HFmrHF contributing hemodynamic factors.   (UA bland, FENA and Kristie low c/w HRS BRADLEY/ prerenal physiology (CRS/ infx))  HRS treated with IV albumin, midodrine, octreotide, diuretics  Recs:  Convert loop diuretics to oral today, will try torsemide 40 mg daily  Hold Aldactone for now, ideally would like to resume prior to discharge if he will tolerate.  Continue midodrine to 10 mg 4 times daily  Protein supplements ordered  FR, low-sodium diet  DC Erazo  Daily RFP     ESLD: acute decompensation with SBP  Chronic due to ETOH, reports abstinent since July 1, not candidate for Tx eval.  Severe portal HTN, ascites and varices/ coagulopathy. Bili better  No severe encephalopathy or other new complications.      SBP: Treated with ceftriaxone, bcx and ascites cx neg so far. Repeat Diag para with improvement in cell counts     Severe hyponatremia: mild in past and now severe due to ADH activation and ARF. 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 hemoglobin stable in the 10 range     Coagulopathy: INR 1.9-2.0, didn't respond to VitK        SUBJECTIVE:    Feels about the same  Has some intermittent discomfort from his Erazo catheter  Continues to have an adequate diuresis  Will discontinue Erazo catheter today    OBJECTIVE:  Physical Exam   Temp: 97.7  F (36.5  C) Temp src: Oral BP: 104/53 Pulse: 65   Resp: 18 SpO2: 96 % O2 Device: None  (Room air)    Vitals:    08/02/25 0552 08/06/25 0859 08/07/25 0941   Weight: 106 kg (233 lb 11 oz) 97.2 kg (214 lb 4.8 oz) 96.8 kg (213 lb 6.4 oz)     Vital Signs with Ranges  Temp:  [97.7  F (36.5  C)-98.4  F (36.9  C)] 97.7  F (36.5  C)  Pulse:  [65-71] 65  Resp:  [18-20] 18  BP: ()/(51-54) 104/53  SpO2:  [96 %-98 %] 96 %  I/O last 3 completed shifts:  In: 600 [P.O.:600]  Out: 3350 [Urine:3350]    Patient Vitals for the past 72 hrs:   Weight   08/07/25 0941 96.8 kg (213 lb 6.4 oz)   08/06/25 0859 97.2 kg (214 lb 4.8 oz)     Intake/Output Summary (Last 24 hours) at 8/6/2025 1336  Last data filed at 8/6/2025 0853  Gross per 24 hour   Intake 600 ml   Output 1850 ml   Net -1250 ml       PHYSICAL EXAM:  General - A & O x 3, NAD jaundice   HEENT - +icteric sclerae   Respiratory - Lungs CTA bilat   Cardiovascular - AP RRR with murmur all precordium  Abdomen - mildly distended   Extremities - well perfused, 2+ pitting now into calves  Neurologic - grossly intact no myoclonus  Psych:  Judgement intact, affect WNL  :  madison with bhargavi urine    LABORATORY STUDIES:     Recent Labs   Lab 08/06/25  0551 08/05/25  0526 08/03/25  0636 08/02/25  0606 08/01/25  0656 07/31/25  2215   WBC  --  10.8 8.9 11.7* 13.3* 15.3*   RBC  --  3.10* 2.83* 3.06* 3.44* 3.58*   HGB 10.6* 10.6* 9.6* 10.4* 11.6* 12.1*   HCT  --  29.0* 26.3* 28.4* 32.7* 33.9*   PLT  --  157 158 169 240 277       Basic Metabolic Panel:  Recent Labs   Lab 08/07/25  0603 08/06/25  0551 08/05/25  0526 08/04/25  2202 08/04/25  2118 08/04/25  1449 08/04/25  0542 08/03/25  0946 08/03/25  0636 08/02/25  1838 08/02/25  1510 08/02/25  0939 08/02/25  0606   * 128* 128* 127*  --  127* 126*  126*   < > 125*  125*   < > 121*  121*   < > 118*   POTASSIUM 3.5 3.5 3.5  3.5 3.3*  --   --  3.2*  --  3.8  --  3.7  --  4.0   CHLORIDE 91* 89* 88*  --   --   --  89*  --  88*  --  88*  --  85*   CO2 24 21* 23  --   --   --  22  --  20*  --  17*  --  18*   BUN 85.2* 84.1*  82.5*  --   --   --  79.6*  --  80.0*  --   --   --  79.2*   CR 4.05* 4.01* 3.59*  --   --   --  3.67*  --  3.91*  --   --   --  4.04*   * 180* 177*  --  136*  --  162*  --  122*  --   --   --  127*   ANDRES 9.6 9.4 9.2  --   --   --  9.0  --  8.5*  --   --   --  8.1*    < > = values in this interval not displayed.       INR  Recent Labs   Lab 08/05/25  0526 08/03/25  0636 08/01/25  0656 07/31/25  2215   INR 2.06* 2.01* 1.92* 1.91*        Recent Labs   Lab Test 08/06/25  0551 08/05/25  0526 08/03/25  0636   INR  --  2.06* 2.01*   WBC  --  10.8 8.9   HGB 10.6* 10.6* 9.6*   PLT  --  157 158       Personally reviewed current labs    This note was dictated using voice recognition     Cornelio Irving PA-C  [facility]  [phone]

## 2025-08-07 NOTE — PLAN OF CARE
Problem: Adult Inpatient Plan of Care  Goal: Plan of Care Review  Description: The Plan of Care Review/Shift note should be completed every shift.  The Outcome Evaluation is a brief statement about your assessment that the patient is improving, declining, or no change.  This information will be displayed automatically on your shift  note.  Outcome: Progressing     Problem: Liver Failure  Goal: Optimal Coping with Liver Failure  Outcome: Progressing     Problem: Liver Failure  Goal: Absence of Bleeding  Outcome: Progressing     Problem: Liver Failure  Goal: Absence of Infection Signs and Symptoms  Outcome: Progressing     Problem: Liver Failure  Goal: Optimal Pain Control, Comfort and Function  Outcome: Progressing     Problem: Comorbidity Management  Goal: Maintenance of Heart Failure Symptom Control  Outcome: Progressing  Intervention: Maintain Heart Failure Management  Recent Flowsheet Documentation  Taken 8/7/2025 0100 by Jg Brooks, RN  Medication Review/Management: medications reviewed  Taken 8/6/2025 2030 by Jg Brooks, RN  Medication Review/Management: medications reviewed    Goal Outcome Evaluation:    Patient AOX4. Able to make needs known. VSS. Denies SOB, nausea and pain. Erazo in place with adequate urine output. Tele - Sinus with 1st degree AV block. Sleeping between cares.

## 2025-08-07 NOTE — PLAN OF CARE
"  Problem: Adult Inpatient Plan of Care  Goal: Plan of Care Review  Description: The Plan of Care Review/Shift note should be completed every shift.  The Outcome Evaluation is a brief statement about your assessment that the patient is improving, declining, or no change.  This information will be displayed automatically on your shift  note.  Outcome: Progressing  Flowsheets (Taken 8/7/2025 0959)  Plan of Care Reviewed With: patient  Goal: Patient-Specific Goal (Individualized)  Description: You can add care plan individualizations to a care plan. Examples of Individualization might be:  \"Parent requests to be called daily at 9am for status\", \"I have a hard time hearing out of my right ear\", or \"Do not touch me to wake me up as it startles  me\".  Outcome: Progressing  Goal: Absence of Hospital-Acquired Illness or Injury  Outcome: Progressing  Intervention: Identify and Manage Fall Risk  Recent Flowsheet Documentation  Taken 8/7/2025 0945 by Vianey Elizabeth RN  Safety Promotion/Fall Prevention:   activity supervised   assistive device/personal items within reach  Intervention: Prevent Infection  Recent Flowsheet Documentation  Taken 8/7/2025 0945 by Vianey Elizabeth RN  Infection Prevention: hand hygiene promoted  Goal: Optimal Comfort and Wellbeing  Outcome: Progressing  Intervention: Provide Person-Centered Care  Recent Flowsheet Documentation  Taken 8/7/2025 0945 by Vianey Elizabeth RN  Trust Relationship/Rapport:   care explained   choices provided   emotional support provided   empathic listening provided   questions answered   questions encouraged   reassurance provided   thoughts/feelings acknowledged  Goal: Readiness for Transition of Care  Outcome: Progressing     Problem: Delirium  Goal: Optimal Coping  Outcome: Progressing  Goal: Improved Behavioral Control  Outcome: Progressing  Intervention: Minimize Safety Risk  Recent Flowsheet Documentation  Taken 8/7/2025 0945 by Vianey Elizabeth RN  Trust Relationship/Rapport:   " care explained   choices provided   emotional support provided   empathic listening provided   questions answered   questions encouraged   reassurance provided   thoughts/feelings acknowledged  Goal: Improved Attention and Thought Clarity  Outcome: Progressing  Goal: Improved Sleep  Outcome: Progressing     Problem: Pain Acute  Goal: Optimal Pain Control and Function  Outcome: Progressing  Intervention: Prevent or Manage Pain  Recent Flowsheet Documentation  Taken 8/7/2025 0945 by Vianey Elizabeth RN  Medication Review/Management: medications reviewed     Problem: Gas Exchange Impaired  Goal: Optimal Gas Exchange  Outcome: Progressing     Problem: Liver Failure  Goal: Optimal Coping with Liver Failure  Outcome: Progressing  Goal: Absence of Bleeding  Outcome: Progressing  Goal: Fluid and Electrolyte Balance  Outcome: Progressing  Intervention: Monitor and Manage Fluid and Electrolyte Balance  Recent Flowsheet Documentation  Taken 8/7/2025 0945 by Vianey Elizabeth RN  Fluid/Electrolyte Management: fluids restricted  Goal: Optimal Gastrointestinal Function  Outcome: Progressing  Intervention: Monitor and Support Gastrointestinal Function  Recent Flowsheet Documentation  Taken 8/7/2025 0945 by Vianey Elizabeth RN  Fluid/Electrolyte Management: fluids restricted  Goal: Blood Glucose Level Within Target Range  Outcome: Progressing  Goal: Hemodynamic Stability  Outcome: Progressing  Goal: Absence of Infection Signs and Symptoms  Outcome: Progressing  Goal: Optimal Neurologic Function  Outcome: Progressing  Goal: Improved Oral Intake  Outcome: Progressing  Goal: Optimal Pain Control, Comfort and Function  Outcome: Progressing  Goal: Effective Oxygenation and Ventilation  Outcome: Progressing  Intervention: Promote Airway Secretion Clearance  Recent Flowsheet Documentation  Taken 8/7/2025 0945 by Vianey Elizabeth RN  Activity Management:   activity encouraged   activity adjusted per tolerance     Problem: Electrolyte Imbalance  Goal:  Electrolyte Balance  Outcome: Progressing  Intervention: Monitor and Manage Electrolyte Imbalance  Recent Flowsheet Documentation  Taken 8/7/2025 0945 by Vianey Elizabeth RN  Fluid/Electrolyte Management: fluids restricted     Problem: Acute Kidney Injury/Impairment  Goal: Fluid and Electrolyte Balance  Outcome: Progressing  Intervention: Monitor and Manage Fluid and Electrolyte Balance  Recent Flowsheet Documentation  Taken 8/7/2025 0945 by Vianey Elizabeth RN  Fluid/Electrolyte Management: fluids restricted  Goal: Improved Oral Intake  Outcome: Progressing  Goal: Effective Renal Function  Outcome: Progressing  Intervention: Monitor and Support Renal Function  Recent Flowsheet Documentation  Taken 8/7/2025 0945 by Vianey Elizabeth RN  Medication Review/Management: medications reviewed     Problem: Comorbidity Management  Goal: Maintenance of Heart Failure Symptom Control  Outcome: Progressing  Intervention: Maintain Heart Failure Management  Recent Flowsheet Documentation  Taken 8/7/2025 0945 by Vianey Elizabeth RN  Medication Review/Management: medications reviewed   Goal Outcome Evaluation:      Plan of Care Reviewed With: patient             Pt alert and oriented. Denies pain. Jaundice. FR 1200. Strict I/O. Daily standing wt completed. Madison draining, Bhargavi clots, sediment. Mg and K protocol ran, no replacement indicated. Lymph wraps in place. Up to chair and bathroom stand by assist. Pt asking if he will be able to go home by Friday.       Order to remove madison and do trial void. 500 bhargavi output. Madison removed at 1430. No void as of this time

## 2025-08-07 NOTE — PROGRESS NOTES
[facility]    Medicine Progress Note - Hospitalist Service    Date of Admission:  7/31/2025    Assessment & Plan      Malcom Chowdhury is a 75 year old male admitted on 7/31/2025.  He has history of alcoholism, mood disorder, recently hospitalized 7/2-7/6, 7/16-7/17 found to have new diagnosis of bleeding esophageal varices and alcoholic cirrhosis, was on lasix /aldactone now with increasing shortness of breath and progressive abdominal distension. Pt has history of needing paracentesis with last reported tap 7/16-Paracentesis was without evidence of SBP. Pt also is markedly jaundiced. Pt reports abdominal pain 3/10.  He reports that this has been progressively worsening and feels similar to what he experienced prior to previous paracentesis.  He reports that he has an EGD scheduled for 8/8 but he does not have any repeat paracentesis scheduled.  He has not had much abdominal pain but has had some diarrhea.  He denies chest pain, fever, nausea, vomiting, or urinary symptoms.  He reports that he has been taking all of his medications as prescribed. Admitted for severe hyponatremia, BRADLEY, severe sepsis 2/2 sbp  -- S/p paracentesis on 08/01 and 08/03.   -- On ceftriaxone for SBP  -- Cultures NGTD  -- Nephrology following for BRADLEY and hyponatremia  -- GI following  -- Dispo: likely 2-4 days of stay        8/4 :       Sodium 122--127  Creatinine 4--3.67  Nephrology following    No fevers  Follow cultures  On iv ceftriaxone    On iv albumin, iv bumex, spironolactone  Lactulose, midodrine, rifaximin  Coagulopathy with INR of 2.01            8/5 :       Sodium 122--127--128  Creatinine 4--3.67--3.59, urine output 2 liters  Nephrology following  On iv albumin, iv bumex, spironolactone, octreotide drip    No fevers  Follow cultures  On iv ceftriaxone    On Lactulose, midodrine, rifaximin  Coagulopathy with INR of 2.06        8/6 :       Sodium 122--127--128--128  Creatinine 4--3.67--3.59--4.01, urine  output 2.3 liters  Nephrology following  On iv albumin, hold iv bumex, spironolactone today, started torsemide 40 mg daily, off octreotide drip    No fevers  Follow cultures - NGTD  On iv ceftriaxone      Lactulose, midodrine, rifaximin  Coagulopathy with INR of 2.06      8/7 :       Sodium 122--127--128--128--131  Creatinine 4--3.67--3.59--4.01--4.05, urine output 3.3 liters  Nephrology following  On iv albumin, discontinued bumex, holding spironolactone today, on  torsemide 40 mg daily, off octreotide drip    No fevers  Follow cultures - NGTD  On iv ceftriaxone    On iv albumin, iv bumex, spironolactone  Lactulose, midodrine, rifaximin  Coagulopathy with INR of 2.06  GI Following      Not medically ready for discharge at this time.  Multi day stay  BRADLEY      A/p :       #Hyponatremia, severe  #AGMA  #BRADLEY-prerenal, ?Hepatorenal syndrome  -- Suspect hyponatremia to be multifactorial: dilutional, dehydration, Pseudohyponatremia 2/2 hyperbilirubinemia.   -- No AMS  -- CT-a/p: cirrhosis, large volume ascites, portal hypertension, mild splenomegaly and intraabdominal venous collaterals. Normal kidney and bladder.  -- Monitor BMP trend  -- Avoid nephrotoxic meds  -- Nephrology consult appreciated: Albumin gtt q12, octreotide, midodrine, bumex q12h, spiranolactone  -- S/p bicarb- sodium q4 hrs. Na: 114-->117-->119--->124  -- Check serum osm, lipid panel- for pseudohyponatremia    #Severe sepsis with bradley, lactic acidosis  #SBP  #Lactic acidosis   -- SIRS (tachypnea, leukocytosis)   -- Bcx NGTD  -- IV antibiotics- empirically rocephin to cover broad-spectrum coverage for gram-positive negatives and anaerobes while  culture and sensitivities are pending  -- S/p Paracentesis with albumin on 08/01. 6.4L of hazy bhargavi colored fluid removed. Ascitic fluid analysis consistent with SBP. Culture pending  -- S/p Paracentesis on 08/03. 1.7 L of bhargavi colored fluid removed. Ascitic fluid analysis sent. Cell count went down  significantly.    #Decompensated  alcoholic cirrhosis of liver with ascites   #Hyperbilirubinemia, Cholestatic LFTs  #Alcoholic hepatitis  -- Presented with severe jaundice, total bili up to 26-->28.2  MELD 3.0: 39 at 8/7/2025  6:03 AM  MELD-Na: 40 at 8/7/2025  6:03 AM  Calculated from:  Serum Creatinine: 4.05 mg/dL (Using max of 3 mg/dL) at 8/7/2025  6:03 AM  Serum Sodium: 131 mmol/L at 8/7/2025  6:03 AM  Total Bilirubin: 21.9 mg/dL at 8/7/2025  6:03 AM  Serum Albumin: 3.7 g/dL (Using max of 3.5 g/dL) at 8/7/2025  6:03 AM  INR(ratio): 2.06 at 8/5/2025  5:26 AM  Age at listing (hypothetical): 75 years  Sex: Male at 8/7/2025  6:03 AM     -- Miki DF: 60's. Holding steroids due to SBP-defer to GI  -- Prognosis is worrisome  -- GI consult appreciated: Vitamin k 10mg iv. Begin lactulose once daily. Rosannao daphney EGD for esophageal varices on 08/08/25    #Portal hypertension  #Large volume ascites due to above  -- Recently started on Lasix and spironolactone to control ascites   -- Currently on Bumex and sprinolactone (started on 08/03)   -- Paracentesis asa above     #Esophageal varices, recent banding 7/3  -- No signs of ongoing bleeding  -- Hgb stable  than at recent discharge  -- Scheduled for followup EGD 8/8     #Alcoholism  -- last EtOH use ~7/1 denies any active drinking  -- States sobriety going well. He had one chem dep counseling session but did not like his counselor.    #RLS  -- Started on gabapentin during last hospital stay with much benefit  PRN dosing     #Peripheral edema likely due to anasarca and liver cirrhosis and hyperammonemia  -- Continue his home compression stockings    Chronic anemia, mild  -- Monitor. No e/o bleeding       Chronic stable issues:     # Recent diagnosis of HFpEF  -- Recent Echocardiogram noting a mildly reduced LVEF 50 to 55%, with no prior echocardiogram available for review. Elevated LVFP  -- Suspect possibly myocarditis due to alcoholism.   -- Recommend follow up TTE in 1-2  months while sober. If remains abnormal, needs to see cards for evaluation    #Heart murmur  #Mild-moderate aortic stenosis  -followup echo as above     # 9mm liver nodule  -- MRI showed 9 mm hyperenhancing nodule in segment 4A of the liver  -- Radiologist recommends follow-up MRI in 3 to 6 months              Diet: 2 Gram Sodium Diet  Fluid restriction 1200 ML FLUID  Snacks/Supplements Adult: Ensure Plus High Protein; Between Meals  Snacks/Supplements Adult: Special K Bar; Between Meals    DVT Prophylaxis: Pneumatic Compression Devices  Erazo Catheter: Not present  Lines: None     Cardiac Monitoring: ACTIVE order. Indication: QTc prolonging medication (48 hours)  Code Status: Full Code      Clinically Significant Risk Factors         # Hyponatremia: Lowest Na = 128 mmol/L in last 2 days, will monitor as appropriate  # Hypochloremia: Lowest Cl = 89 mmol/L in last 2 days, will monitor as appropriate      # Hypoalbuminemia: Lowest albumin = 2.5 g/dL at 7/31/2025 10:15 PM, will monitor as appropriate    # Coagulation Defect: INR = 2.06 (Ref range: 0.85 - 1.15) and/or PTT = 42 Seconds (Ref range: 22 - 38 Seconds), will monitor for bleeding   # Acute Kidney Injury, unspecified: based on a >150% or 0.3 mg/dL increase in last creatinine compared to past 90 day average, will monitor renal function              # Severe Malnutrition: based on nutrition assessment and treatment provided per dietitian's recommendations.      # Financial/Environmental Concerns:           Social Drivers of Health    Tobacco Use: Unknown (7/16/2025)    Patient History     Smoking Tobacco Use: Never     Smokeless Tobacco Use: Unknown          Disposition Plan     Medically Ready for Discharge: Anticipated in 2-4 Days             Tip Partida MD  Hospitalist Service  [facility]  Securely message with [employer] (more info)  Text page via [employer] Paging/Directory    ______________________________________________________________________      Physical Exam   Vital Signs: Temp: 97.7  F (36.5  C) Temp src: Oral BP: 107/53 Pulse: 64   Resp: 18 SpO2: 96 % O2 Device: None (Room air)    Weight: 213 lbs 6.4 oz    GEN: Alert and oriented. Not in acute distress.  HEENT: Atraumatic, mucous membrane- moist and pink. Icteric sclerae.  Chest: Decreased bilateral air entry basally.  CVS: S1S2 regular.   Abdomen: Distended, non-tender. No guarding or rigidity. Bowel sounds active.   Extremities: b/l LE edmea. Anasarca  CNS: No involuntary movements.  Skin: no cyanosis or clubbing.  Jaundice    Medical Decision Making       52 MINUTES SPENT BY ME on the date of service doing chart review, history, exam, documentation & further activities per the note.      Data

## 2025-08-07 NOTE — PROGRESS NOTES
Care Management Follow Up    Length of Stay (days): 7    Expected Discharge Date: 08/09/2025     Concerns to be Addressed: Care progression - discharge planning      Patient plan of care discussed at interdisciplinary rounds: Yes    Anticipated Discharge Disposition: PT rec home with assist; home with home care     Anticipated Discharge Services: Home care  Anticipated Discharge DME:  NA    Patient/family educated on Medicare website which has current facility and service quality ratings:  NA  Education Provided on the Discharge Plan:  Yes per team  Patient/Family in Agreement with the Plan:  Yes    Referrals Placed by CM/SW:  Yes  Private pay costs discussed: Not applicable    Discussed  Partnership in Safe Discharge Planning  document with patient/family: No     Handoff Completed: No, handoff not indicated or clinically appropriate    Additional Information:  Met with patient at bedside to discuss PT discharge rec for Transitional care and patient agreed.    Referral sent for home care PT    Next Steps: RNCM to follow for medical progression, recommendations, and final discharge plan.     Tiffani Dolan RN  Acute Care Management  [facility]  For further questions/concerns you can reach us at [employer]     1136 rec'd a message from Caity with [employer], received a message from [facility] and he has not been seen by PCP since 2018. Please make sure he gets set up with a new pcp prior to discharge.     Met with patient at bedside to update on the above and patient reported he already has an appt with Dr. Ruiz on Mon 8/11 at [facility].    Message sent to update [facility]

## 2025-08-08 ENCOUNTER — APPOINTMENT (OUTPATIENT)
Dept: PHYSICAL THERAPY | Facility: HOSPITAL | Age: 75
DRG: 432 | End: 2025-08-08
Attending: INTERNAL MEDICINE
Payer: COMMERCIAL

## 2025-08-08 ENCOUNTER — APPOINTMENT (OUTPATIENT)
Dept: ULTRASOUND IMAGING | Facility: HOSPITAL | Age: 75
DRG: 432 | End: 2025-08-08
Attending: PHYSICIAN ASSISTANT
Payer: COMMERCIAL

## 2025-08-08 ENCOUNTER — APPOINTMENT (OUTPATIENT)
Dept: OCCUPATIONAL THERAPY | Facility: HOSPITAL | Age: 75
DRG: 432 | End: 2025-08-08
Attending: PHYSICIAN ASSISTANT
Payer: COMMERCIAL

## 2025-08-08 LAB
ALBUMIN SERPL BCG-MCNC: 3.3 G/DL (ref 3.5–5.2)
ALBUMIN SERPL BCG-MCNC: 3.4 G/DL (ref 3.5–5.2)
ALP SERPL-CCNC: 158 U/L (ref 40–150)
ALT SERPL W P-5'-P-CCNC: 86 U/L (ref 0–70)
ANION GAP SERPL CALCULATED.3IONS-SCNC: 18 MMOL/L (ref 7–15)
AST SERPL W P-5'-P-CCNC: 137 U/L (ref 0–45)
BACTERIA FLD CULT: NO GROWTH
BILIRUB DIRECT SERPL-MCNC: 12.18 MG/DL (ref 0–0.3)
BILIRUB SERPL-MCNC: 20.7 MG/DL
BUN SERPL-MCNC: 87.1 MG/DL (ref 8–23)
CALCIUM SERPL-MCNC: 9.3 MG/DL (ref 8.8–10.4)
CHLORIDE SERPL-SCNC: 90 MMOL/L (ref 98–107)
CREAT SERPL-MCNC: 4.09 MG/DL (ref 0.67–1.17)
EGFRCR SERPLBLD CKD-EPI 2021: 15 ML/MIN/1.73M2
GLUCOSE SERPL-MCNC: 161 MG/DL (ref 70–99)
GRAM STAIN RESULT: NORMAL
GRAM STAIN RESULT: NORMAL
HCO3 SERPL-SCNC: 22 MMOL/L (ref 22–29)
HOLD SPECIMEN: NORMAL
LYMPHOCYTES NFR FLD MANUAL: 0 %
MAGNESIUM SERPL-MCNC: 2 MG/DL (ref 1.7–2.3)
MONOS+MACROS NFR FLD MANUAL: 9 %
NEUTROPHILS # FLD: ABNORMAL /UL (ref ?–250)
NEUTS BAND NFR FLD MANUAL: 90 %
OTHER CELLS FLD MANUAL: 1 %
PATH REV: ABNORMAL
PHOSPHATE SERPL-MCNC: 4.9 MG/DL (ref 2.5–4.5)
POTASSIUM SERPL-SCNC: 3.4 MMOL/L (ref 3.4–5.3)
POTASSIUM SERPL-SCNC: 3.4 MMOL/L (ref 3.4–5.3)
POTASSIUM SERPL-SCNC: 3.9 MMOL/L (ref 3.4–5.3)
PROT SERPL-MCNC: 5.9 G/DL (ref 6.4–8.3)
SODIUM SERPL-SCNC: 130 MMOL/L (ref 135–145)

## 2025-08-08 PROCEDURE — 250N000013 HC RX MED GY IP 250 OP 250 PS 637: Performed by: INTERNAL MEDICINE

## 2025-08-08 PROCEDURE — 120N000001 HC R&B MED SURG/OB

## 2025-08-08 PROCEDURE — 36415 COLL VENOUS BLD VENIPUNCTURE: CPT | Performed by: EMERGENCY MEDICINE

## 2025-08-08 PROCEDURE — 84132 ASSAY OF SERUM POTASSIUM: CPT | Performed by: EMERGENCY MEDICINE

## 2025-08-08 PROCEDURE — 83735 ASSAY OF MAGNESIUM: CPT | Performed by: INTERNAL MEDICINE

## 2025-08-08 PROCEDURE — 97110 THERAPEUTIC EXERCISES: CPT | Mod: GP

## 2025-08-08 PROCEDURE — 82248 BILIRUBIN DIRECT: CPT | Performed by: PHYSICIAN ASSISTANT

## 2025-08-08 PROCEDURE — P9047 ALBUMIN (HUMAN), 25%, 50ML: HCPCS | Performed by: PHYSICIAN ASSISTANT

## 2025-08-08 PROCEDURE — 76705 ECHO EXAM OF ABDOMEN: CPT

## 2025-08-08 PROCEDURE — 250N000013 HC RX MED GY IP 250 OP 250 PS 637: Performed by: PHYSICIAN ASSISTANT

## 2025-08-08 PROCEDURE — 250N000013 HC RX MED GY IP 250 OP 250 PS 637: Performed by: EMERGENCY MEDICINE

## 2025-08-08 PROCEDURE — 250N000013 HC RX MED GY IP 250 OP 250 PS 637: Performed by: HOSPITALIST

## 2025-08-08 PROCEDURE — 99232 SBSQ HOSP IP/OBS MODERATE 35: CPT | Performed by: EMERGENCY MEDICINE

## 2025-08-08 PROCEDURE — 250N000011 HC RX IP 250 OP 636: Performed by: PHYSICIAN ASSISTANT

## 2025-08-08 PROCEDURE — 82435 ASSAY OF BLOOD CHLORIDE: CPT | Performed by: INTERNAL MEDICINE

## 2025-08-08 PROCEDURE — 97535 SELF CARE MNGMENT TRAINING: CPT | Mod: GO

## 2025-08-08 PROCEDURE — 99232 SBSQ HOSP IP/OBS MODERATE 35: CPT | Performed by: PHYSICIAN ASSISTANT

## 2025-08-08 PROCEDURE — 36415 COLL VENOUS BLD VENIPUNCTURE: CPT | Performed by: INTERNAL MEDICINE

## 2025-08-08 RX ORDER — TORSEMIDE 20 MG/1
20 TABLET ORAL DAILY
Status: DISCONTINUED | OUTPATIENT
Start: 2025-08-09 | End: 2025-08-09 | Stop reason: HOSPADM

## 2025-08-08 RX ORDER — POTASSIUM CHLORIDE 1500 MG/1
20 TABLET, EXTENDED RELEASE ORAL ONCE
Status: COMPLETED | OUTPATIENT
Start: 2025-08-08 | End: 2025-08-08

## 2025-08-08 RX ORDER — SPIRONOLACTONE 25 MG/1
25 TABLET ORAL DAILY
Status: DISCONTINUED | OUTPATIENT
Start: 2025-08-08 | End: 2025-08-09 | Stop reason: HOSPADM

## 2025-08-08 RX ORDER — ALBUMIN (HUMAN) 12.5 G/50ML
50 SOLUTION INTRAVENOUS ONCE
Status: COMPLETED | OUTPATIENT
Start: 2025-08-08 | End: 2025-08-08

## 2025-08-08 RX ADMIN — MIDODRINE HYDROCHLORIDE 10 MG: 5 TABLET ORAL at 16:23

## 2025-08-08 RX ADMIN — TORSEMIDE 40 MG: 20 TABLET ORAL at 09:34

## 2025-08-08 RX ADMIN — MIDODRINE HYDROCHLORIDE 10 MG: 5 TABLET ORAL at 21:25

## 2025-08-08 RX ADMIN — Medication 1 TABLET: at 09:34

## 2025-08-08 RX ADMIN — RIFAXIMIN 550 MG: 550 TABLET ORAL at 09:34

## 2025-08-08 RX ADMIN — SPIRONOLACTONE 25 MG: 25 TABLET, FILM COATED ORAL at 11:52

## 2025-08-08 RX ADMIN — RIFAXIMIN 550 MG: 550 TABLET ORAL at 21:25

## 2025-08-08 RX ADMIN — THIAMINE HCL TAB 100 MG 100 MG: 100 TAB at 09:34

## 2025-08-08 RX ADMIN — POTASSIUM CHLORIDE 20 MEQ: 1500 TABLET, EXTENDED RELEASE ORAL at 09:34

## 2025-08-08 RX ADMIN — LACTULOSE SOLUTION USP, 10 G/15 ML 10 G: 10 SOLUTION ORAL; RECTAL at 09:34

## 2025-08-08 RX ADMIN — ALBUMIN HUMAN 50 G: 0.25 SOLUTION INTRAVENOUS at 14:08

## 2025-08-08 RX ADMIN — RIFAXIMIN 550 MG: 550 TABLET ORAL at 13:26

## 2025-08-08 RX ADMIN — POTASSIUM CHLORIDE 20 MEQ: 1500 TABLET, EXTENDED RELEASE ORAL at 16:22

## 2025-08-08 RX ADMIN — MIDODRINE HYDROCHLORIDE 10 MG: 5 TABLET ORAL at 13:26

## 2025-08-08 RX ADMIN — MIDODRINE HYDROCHLORIDE 10 MG: 5 TABLET ORAL at 09:34

## 2025-08-08 ASSESSMENT — ACTIVITIES OF DAILY LIVING (ADL)
ADLS_ACUITY_SCORE: 43

## 2025-08-08 NOTE — PLAN OF CARE
Problem: Delirium  Goal: Improved Behavioral Control  Intervention: Minimize Safety Risk  Recent Flowsheet Documentation  Taken 8/8/2025 0026 by Malcom Hernandez RN  Trust Relationship/Rapport: care explained     Problem: Pain Acute  Goal: Optimal Pain Control and Function  Outcome: Progressing   Goal Outcome Evaluation:       Patient alert and oriented with no complaints of pain.

## 2025-08-08 NOTE — PLAN OF CARE
Goal Outcome Evaluation:         Problem: Adult Inpatient Plan of Care  Goal: Absence of Hospital-Acquired Illness or Injury  Intervention: Prevent Skin Injury  Recent Flowsheet Documentation  Taken 8/8/2025 0935 by Sigrid Khan RN  Body Position: position changed independently     Problem: Pain Acute  Goal: Optimal Pain Control and Function  Outcome: Progressing  Intervention: Prevent or Manage Pain  Recent Flowsheet Documentation  Taken 8/8/2025 0935 by Sigrid Khan RN  Medication Review/Management: medications reviewed     Problem: Gas Exchange Impaired  Goal: Optimal Gas Exchange  Outcome: Progressing  Intervention: Optimize Oxygenation and Ventilation  Recent Flowsheet Documentation  Taken 8/8/2025 0935 by Sigrid Khan RN  Head of Bed (HOB) Positioning: HOB at 20-30 degrees     Problem: Liver Failure  Goal: Optimal Coping with Liver Failure  Outcome: Progressing         Pt is RA, VSS, denied pain, went for a paracentesis ultrasound and had no fluid to take out, was sent back.  Lymph edema treatment completed, phos , mg and k protocols, with k replaced today.

## 2025-08-08 NOTE — PROGRESS NOTES
Daily Progress Note    Assessment/Plan:  Malcom Chowdhury is a 75 year old male admitted on 7/31/2025.  He has history of alcoholism, mood disorder, recently hospitalized 7/2-7/6, 7/16-7/17 found to have new diagnosis of bleeding esophageal varices and alcoholic cirrhosis, was on lasix /aldactone now with increasing shortness of breath and progressive abdominal distension. Pt has history of needing paracentesis with last reported tap 7/16-Paracentesis was without evidence of SBP. Pt also is markedly jaundiced. Pt reports abdominal pain 3/10.  He reports that this has been progressively worsening and feels similar to what he experienced prior to previous paracentesis.  He reports that he has an EGD scheduled for 8/8 but he does not have any repeat paracentesis scheduled.  He has not had much abdominal pain but has had some diarrhea.  He denies chest pain, fever, nausea, vomiting, or urinary symptoms.  He reports that he has been taking all of his medications as prescribed. Admitted for severe hyponatremia, BRADLEY, severe sepsis 2/2 sbp  -- S/p paracentesis on 08/01 and 08/03.   -- On ceftriaxone for SBP  -- Cultures NGTD  -- Nephrology following for BRADLEY and hyponatremia  -- GI following  -- Dispo: likely 2-4 days of stay    8/8: Creatinine increased to 4.09 today.  Further management per nephrology.  GI signed off              8/7 :        Nephrology following  On iv albumin, discontinued bumex, holding spironolactone today, on  torsemide 40 mg daily, off octreotide drip     No fevers  Follow cultures - NGTD  Completed 7 days of iv ceftriaxone  On Lactulose, midodrine, rifaximin  Coagulopathy with INR of 2.06          Not medically ready for discharge at this time.  Multi day stay  BRADLEY        A/p :         #Hyponatremia, severe  #AGMA  #BRADLEY-prerenal, ?Hepatorenal syndrome  -- Baseline normal renal fxn, bland UA last month and previously normal renal anatomy on imaging. Creat 0.8 7/17/25.   -- Non oliguric BRADLEY, severe  volume overload, soft BP related to liver disease. Suspect HRS BRADLEY but also acute infx / SBP, tense ascites and HFmrHF contributing hemodynamic factors.   -- No AMS  -- CT-a/p: cirrhosis, large volume ascites, portal hypertension, mild splenomegaly and intraabdominal venous collaterals. Normal kidney and bladder.  -- Avoid nephrotoxic meds  - UA bland, FENA and Kristie low c/w HRS BRADLEY/ prerenal physiology (CRS/ infx))  -Creatinine up to 4.09 today              #Severe sepsis with bradley, lactic acidosis  #SBP  #Lactic acidosis   -- SIRS (tachypnea, leukocytosis)   -- Bcx NGTD  -- IV antibiotics- empirically rocephin to cover broad-spectrum coverage for gram-positive negatives and anaerobes while  culture and sensitivities are pending  -- S/p Paracentesis with albumin on 08/01. 6.4L of hazy bhargavi colored fluid removed. Ascitic fluid analysis consistent with SBP. Culture NGTD  -- S/p Paracentesis on 08/03. 1.7 L of bhargavi colored fluid removed. Ascitic fluid analysis sent. Cell count went down significantly.  Completed 7 days of iv ceftriaxone  GI following     #Decompensated  alcoholic cirrhosis of liver with ascites   #Hyperbilirubinemia, Cholestatic LFTs  #Alcoholic hepatitis  -- Presented with severe jaundice, total bili up to 26-->28.2  MELD 3.0: 39 at 8/7/2025  6:03 AM  MELD-Na: 40 at 8/7/2025  6:03 AM  Calculated from:  Serum Creatinine: 4.05 mg/dL (Using max of 3 mg/dL) at 8/7/2025  6:03 AM  Serum Sodium: 131 mmol/L at 8/7/2025  6:03 AM  Total Bilirubin: 21.9 mg/dL at 8/7/2025  6:03 AM  Serum Albumin: 3.7 g/dL (Using max of 3.5 g/dL) at 8/7/2025  6:03 AM  INR(ratio): 2.06 at 8/5/2025  5:26 AM  Age at listing (hypothetical): 75 years  Sex: Male at 8/7/2025  6:03 AM     -- Miki DF: 60's. Holding steroids due to SBP-defer to GI  -- Prognosis is worrisome  -- GI consult appreciated: Vitamin k 10mg iv. Begin lactulose once daily. Hattie shelley EGD for esophageal varices on 08/08/25     #Portal hypertension  #Large volume  ascites due to above  -- Recently started on Lasix and spironolactone to control ascites   -- Currently on Bumex and sprinolactone (started on 08/03)   -- Paracentesis asa above     #Esophageal varices, recent banding 7/3  -- No signs of ongoing bleeding  -- Hgb stable  than at recent discharge  -- Scheduled for followup EGD 8/8     #Alcoholism  -- last EtOH use ~7/1 denies any active drinking  -- States sobriety going well. He had one chem dep counseling session but did not like his counselor.    #RLS  -- Started on gabapentin during last hospital stay with much benefit  PRN dosing     #Peripheral edema likely due to anasarca and liver cirrhosis and hyperammonemia  -- Continue his home compression stockings     Chronic anemia, mild  -- Monitor. No e/o bleeding        Chronic stable issues:     # Recent diagnosis of HFpEF  -- Recent Echocardiogram noting a mildly reduced LVEF 50 to 55%, with no prior echocardiogram available for review. Elevated LVFP  -- Suspect possibly myocarditis due to alcoholism.   -- Recommend follow up TTE in 1-2 months while sober. If remains abnormal, needs to see cards for evaluation     #Heart murmur  #Mild-moderate aortic stenosis  -followup echo as above     # 9mm liver nodule  -- MRI showed 9 mm hyperenhancing nodule in segment 4A of the liver  -- Radiologist recommends follow-up MRI in 3 to 6 months            Diet: 2 Gram Sodium Diet  Fluid restriction 1200 ML FLUID  Snacks/Supplements Adult: Ensure Plus High Protein; Between Meals  Snacks/Supplements Adult: Special K Bar; Between Meals    DVT Prophylaxis: Pneumatic Compression Devices  Erazo Catheter: Not present  Lines: None     Cardiac Monitoring: ACTIVE order. Indication: QTc prolonging medication (48 hours)  Code Status: Full Code    Clinically Significant Risk Factors         # Hyponatremia: Lowest Na = 130 mmol/L in last 2 days, will monitor as appropriate  # Hypochloremia: Lowest Cl = 90 mmol/L in last 2 days, will monitor as  appropriate      # Hypoalbuminemia: Lowest albumin = 2.5 g/dL at 7/31/2025 10:15 PM, will monitor as appropriate    # Acute Kidney Injury, unspecified: based on a >150% or 0.3 mg/dL increase in last creatinine compared to past 90 day average, will monitor renal function              # Severe Malnutrition: based on nutrition assessment and treatment provided per dietitian's recommendations.    # Financial/Environmental Concerns:              Code status:Full Code        Barriers to Discharge: Rising creatinine    Disposition: Anticipate discharge in 1 to 2 days    Subjective:  Malcom is new to me today, chart reviewed and discussed with staff.  He feels about the same today.        Current Medications Reviewed via EHR List    Objective:  Vital signs in last 24 hours:  [unfilled]  .prog  Weight:   @THISENCWEIGHTS(1)@  Weight change: -0.408 kg (-14.4 oz)  Body mass index is 31.17 kg/m .    Intake/Output last 3 shifts:  I/O last 3 completed shifts:  In: 490 [P.O.:490]  Out: 1750 [Urine:1750]  Intake/Output this shift:  No intake/output data recorded.    Physical Exam:  General: No apparent distress, up in chair  CV: Regular rate rhythm  Lungs: No wheezing  Abdomen:        Imaging:  Personally Reviewed.  US Abdomen Limited  Result Date: 8/8/2025  EXAM: US ABDOMEN LIMITED LOCATION: [facility] DATE: 8/8/2025 INDICATION: Evaluate for ascites. Cirrhosis. COMPARISON: Abdominal ultrasound with paracentesis 8/3/2025, 8/1/2025 TECHNIQUE: Limited abdominal ultrasound. FINDINGS: Examination of all 4 quadrants does not reflect any residual ascitic fluid today.     US Paracentesis without Albumin  Result Date: 8/3/2025  EXAM: 1. PARACENTESIS 2. ULTRASOUND GUIDANCE LOCATION: [facility] DATE: 8/3/2025 INDICATION: Ascites. PROCEDURE: Informed consent obtained. Time out performed. The abdomen was prepped and draped in a sterile fashion. 10 mL of 1% lidocaine was infused into local soft  tissues. A 5 Sinhala catheter system was introduced into the abdominal ascites under ultrasound guidance. 1.7 liters of bhargavi-colored fluid were removed and sent to lab if requested. Patient tolerated procedure well. Ultrasound imaging was obtained and placed in the patient's permanent medical record.     IMPRESSION: 1.  Status post ultrasound-guided paracentesis. Reference CPT Code: 81340    US Paracentesis with Albumin  Result Date: 8/1/2025  EXAM: 1. PARACENTESIS 2. ULTRASOUND GUIDANCE LOCATION: [facility] DATE: 8/1/2025 INDICATION: Ascites. PROCEDURE: Informed consent obtained. Time out performed. The abdomen was prepped and draped in a sterile fashion. 5 mL of 1% lidocaine was infused into local soft tissues. A 5 Sinhala catheter system was introduced into the abdominal ascites under ultrasound guidance. 6.4 liters of hazy bhargavi-colored fluid were removed and sent to lab if requested. Patient tolerated procedure well. Ultrasound imaging was obtained and placed in the patient's permanent medical record.     IMPRESSION: 1.  Status post ultrasound-guided paracentesis. Reference CPT Code: 86923    CT Abdomen Pelvis w/o Contrast  Result Date: 7/31/2025  EXAM: CT ABDOMEN PELVIS W/O CONTRAST LOCATION: [facility] DATE: 7/31/2025 INDICATION: Abdominal distention. COMPARISON: 7/16/2025. TECHNIQUE: CT scan of the abdomen and pelvis was performed without IV contrast. Multiplanar reformats were obtained. Dose reduction techniques were used. CONTRAST: None. FINDINGS: LOWER CHEST: Mild scattered atelectasis in the lung bases. No consolidation or significant pleural effusion. HEPATOBILIARY: Cirrhotic morphology of the liver redemonstrated. A 1 m cyst again seen in the left hepatic lobe. Large volume ascites is increased from prior. Gallbladder is normal on this noncontrast enhanced study. PANCREAS: Normal. SPLEEN: Mildly enlarged measuring 14.7 cm maximally. Capsular  calcification along the lateral splenic surface is unchanged. ADRENAL GLANDS: Normal. KIDNEYS/BLADDER: Normal. BOWEL: No inflammatory bowel wall thickening or bowel obstruction. Appendix is normal. No free air. LYMPH NODES: Normal. VASCULATURE: Mild aortoiliac atherosclerotic calcifications. No abdominal aortic aneurysm. Multiple intra-abdominal venous collateral vessels present. PELVIC ORGANS: Normal. MUSCULOSKELETAL: Mild spinal degenerative changes. No suspicious osseous lesions or acute fractures.     IMPRESSION: 1.  Redemonstration of cirrhosis with large volume ascites. 2.  Findings of portal hypertension including mild splenomegaly and intra-abdominal venous collaterals.     US Paracentesis with Albumin  Result Date: 7/16/2025  EXAM: 1. PARACENTESIS 2. ULTRASOUND GUIDANCE LOCATION: [facility] DATE: 7/16/2025 INDICATION: Ascites. PROCEDURE: Informed consent obtained. Time out performed. The abdomen was prepped and draped in a sterile fashion. 10 mL of 1% lidocaine was infused into local soft tissues. A 5 Indonesian catheter system was introduced into the abdominal ascites under ultrasound guidance. 3 liters of clear fluid were removed and sent to lab if requested. Patient tolerated procedure well. Ultrasound imaging was obtained and placed in the patient's permanent medical record.     IMPRESSION: 1.  Status post ultrasound-guided paracentesis. Reference CPT Code: 26828    CT Abdomen Pelvis w Contrast  Result Date: 7/16/2025  EXAM: CT ABDOMEN PELVIS W CONTRAST LOCATION: [facility] DATE: 7/16/2025 INDICATION: new onset jaundice, abd distension COMPARISON: Liver MRI 7/3/2025; CT angiogram of the abdomen and pelvis 7/2/2025 TECHNIQUE: CT scan of the abdomen and pelvis was performed following injection of IV contrast. Multiplanar reformats were obtained. Dose reduction techniques were used. CONTRAST: IsoVue 370 90mL FINDINGS: LOWER CHEST: Symmetric subpleural  "atelectasis in both lower lobes near the diaphragm. There are moderate degenerative calcifications of aortic valve leaflets. Small lower paraesophageal varices. HEPATOBILIARY: Nodular liver contour and diffuse liver parenchymal heterogeneity consistent with cirrhosis/fibrosis and probable regenerative nodules. No calcified gallstones. No bile duct enlargement. PANCREAS: Normal. SPLEEN: Mildly enlarged spleen has been calcifications along the lateral capsule. ADRENAL GLANDS: Normal. KIDNEYS/BLADDER: Kidneys are normal in size with symmetric parenchymal enhancement and normal cortex thickness. No hydronephrosis or hydroureter. Urinary bladder is decompressed. BOWEL:  Stomach is mostly decompressed. Centralization of small bowel in the anterior abdomen. Colon is mostly decompressed. No mucosal hyperenhancement or mechanical obstruction. Four-quadrant abdominal ascites. Mesenteric and omental hazy attenuation consistent with edema/congestion. Normal contrast opacification of the mesenteric and portal veins. LYMPH NODES: No new enlarged lymph nodes in the abdomen or pelvis. VASCULATURE: Moderate patchy aortoiliac atheromatous calcifications. PELVIC ORGANS: Prostate gland is normal in size. Seminal vesicles are symmetric. MUSCULOSKELETAL: There are contiguous degenerative osteophytes of the lower thoracic spine. Moderate osteophytes of the lumbar spine. No spondylolisthesis or compression deformity.     IMPRESSION: Heterogeneous, nodular liver consistent with cirrhosis. No definite liver mass. Associated portal hypertension with splenomegaly, ascites, mesenteric congestion and portosystemic varices.       Lab Results:  Personally Reviewed.   Fingerstick Blood Glucose: @LCLBMGB29IHY(POCGLUFGR:10)@    Last Hbg A1C: No results found for: \"HGBA1C\"   Lab Results   Component Value Date    INR 2.06 (H) 08/05/2025    INR 2.01 (H) 08/03/2025    INR 1.92 (H) 08/01/2025    PROTIME 23.4 (H) 08/05/2025    PROTIME 22.9 (H) 08/03/2025 "    PROTIME 22.1 (H) 08/01/2025     Recent Results (from the past 24 hours)   Magnesium    Collection Time: 08/08/25  5:47 AM   Result Value Ref Range    Magnesium 2.0 1.7 - 2.3 mg/dL   Renal Panel (Limited Occurrences)    Collection Time: 08/08/25  5:47 AM   Result Value Ref Range    Sodium 130 (L) 135 - 145 mmol/L    Potassium 3.4 3.4 - 5.3 mmol/L    Chloride 90 (L) 98 - 107 mmol/L    Carbon Dioxide (CO2) 22 22 - 29 mmol/L    Anion Gap 18 (H) 7 - 15 mmol/L    Glucose 161 (H) 70 - 99 mg/dL    Urea Nitrogen 87.1 (H) 8.0 - 23.0 mg/dL    Creatinine 4.09 (H) 0.67 - 1.17 mg/dL    GFR Estimate 15 (L) >60 mL/min/1.73m2    Calcium 9.3 8.8 - 10.4 mg/dL    Albumin 3.4 (L) 3.5 - 5.2 g/dL    Phosphorus 4.9 (H) 2.5 - 4.5 mg/dL   Extra Purple Top EDTA (LAB USE ONLY)    Collection Time: 08/08/25  5:47 AM   Result Value Ref Range    Hold Specimen [facility]    Hepatic Function Panel (Limited Occurrences)    Collection Time: 08/08/25  5:47 AM   Result Value Ref Range    Protein Total 5.9 (L) 6.4 - 8.3 g/dL    Albumin 3.3 (L) 3.5 - 5.2 g/dL    Bilirubin Total 20.7 (HH) <=1.2 mg/dL    Alkaline Phosphatase 158 (H) 40 - 150 U/L     (H) 0 - 45 U/L    ALT 86 (H) 0 - 70 U/L    Bilirubin Direct 12.18 (H) 0.00 - 0.30 mg/dL   Potassium    Collection Time: 08/08/25 12:38 PM   Result Value Ref Range    Potassium 3.4 3.4 - 5.3 mmol/L       Medically Ready for Discharge: Anticipated Tomorrow       Advanced Care Planning    Medical Decision Making       50 MINUTES SPENT BY ME on the date of service doing chart review, history, exam, documentation & further activities per the note.      Henok Lucero MD  Date: 8/8/2025  Time: 2:14 PM  [facility]

## 2025-08-08 NOTE — PROGRESS NOTES
GI PROGRESS NOTE  8/8/2025  Malcom Noen  1950  /-21    Subjective:  No complaints.  Asking when he can go home.     Objective:    Patient Vitals for the past 24 hrs:   BP Temp Temp src Pulse Resp SpO2 Weight   08/08/25 0735 95/53 97.9  F (36.6  C) Oral 67 18 97 % --   08/08/25 0700 -- -- -- -- -- -- 95.8 kg (211 lb 3.2 oz)   08/08/25 0426 106/51 97.9  F (36.6  C) Oral 59 20 94 % --   08/08/25 0026 97/51 98  F (36.7  C) Oral 62 19 97 % --   08/07/25 2014 95/50 97.7  F (36.5  C) Oral 65 18 98 % --   08/07/25 1546 113/59 97.8  F (36.6  C) Oral 69 18 98 % --   08/07/25 1334 107/53 -- -- 64 -- -- --     Body mass index is 31.17 kg/m .  Gen: NAD  Skin: Jaundice  GI: Non-distended, BS positive, soft, non-tender    Laboratory  Recent Labs   Lab Test 08/06/25  0551 08/05/25  0526 08/03/25  0636 08/02/25  0606 08/01/25  0656   WBC  --  10.8 8.9 11.7* 13.3*   HGB 10.6* 10.6* 9.6* 10.4* 11.6*   MCV 96 94 93 93 95   PLT  --  157 158 169 240   INR  --  2.06* 2.01*  --  1.92*     Recent Labs   Lab Test 08/08/25  0547 08/07/25  0603 08/06/25  0551   * 131* 128*   POTASSIUM 3.4 3.5 3.5   CHLORIDE 90* 91* 89*   CO2 22 24 21*   BUN 87.1* 85.2* 84.1*   CR 4.09* 4.05* 4.01*   ANIONGAP 18* 16* 18*   ANDRES 9.3 9.6 9.4   * 148* 180*     Recent Labs   Lab Test 08/08/25  0547 08/07/25  0603 08/06/25  0551 08/02/25  0606 08/01/25  1222 08/01/25  0656 07/31/25  2215 07/17/25  0703 07/16/25  0942 07/03/25  0635 07/02/25  1759   ALBUMIN 3.3*  3.4* 3.7  3.7 3.6   < >  --    < > 2.5*   < > 3.0*   < >  --    BILITOTAL 20.7* 21.9* 22.8*   < >  --    < > 26.5*   < > 26.9*   < >  --    ALT 86* 90* 84*   < >  --    < > 84*   < > 44   < >  --    * 142* 143*   < >  --    < > 135*   < > 114*   < >  --    ALKPHOS 158* 184* 156*   < >  --    < > 166*   < > 161*   < >  --    PROTEIN  --   --   --   --  Negative  --   --   --   --   --  Negative   LIPASE  --   --   --   --   --   --  212*  --  160*  --   --     < > =  values in this interval not displayed.     MELD 3.0: 39 at 8/7/2025  6:03 AM  MELD-Na: 40 at 8/7/2025  6:03 AM  Calculated from:  Serum Creatinine: 4.05 mg/dL (Using max of 3 mg/dL) at 8/7/2025  6:03 AM  Serum Sodium: 131 mmol/L at 8/7/2025  6:03 AM  Total Bilirubin: 21.9 mg/dL at 8/7/2025  6:03 AM  Serum Albumin: 3.7 g/dL (Using max of 3.5 g/dL) at 8/7/2025  6:03 AM  INR(ratio): 2.06 at 8/5/2025  5:26 AM  Age at listing (hypothetical): 75 years  Sex: Male at 8/7/2025  6:03 AM    US Abdomen Limited  Result Date: 8/8/2025  EXAM: US ABDOMEN LIMITED LOCATION: [facility] DATE: 8/8/2025 INDICATION: Evaluate for ascites. Cirrhosis. COMPARISON: Abdominal ultrasound with paracentesis 8/3/2025, 8/1/2025 TECHNIQUE: Limited abdominal ultrasound. FINDINGS: Examination of all 4 quadrants does not reflect any residual ascitic fluid today.     US Paracentesis without Albumin  Result Date: 8/3/2025- 1.7 liters removed  Absolute neutrophils- 1789     US Paracentesis with Albumin  Result Date: 8/1/2025- 6.4 liters removed  Absolute neutrophils- 15,156     Blood cultures 8/1 no growth  Ascites culture 8/1 and 8/3 no growth     Assessment:  He is a 75 year old with decompensated alcohol associated cirrhosis who has been sober since 7/1.  He is admitted with abdominal distention and SOB.      Cirrhosis- MELD 3.0 is relatively stable at 39.  Not a transplant candidate due to recent use of etoh and his age.  Tbili is slowly improving.  No steroids due to SBP.  Getting low dose lactulose without signs of encephalopathy.  Expect a slow long recovery, and his liver is relatively stable at this point.  Ongoing sobriety is crucial for his recovery.  Ascites with SBP- completed one week of antibiotics 8/7, and repeat tap 8/3 showed improved neutrophils.  On a low NA diet.  Renal managing fluids and diuretics.  Has been getting albumin and midodrine.  Repeat paracentesis was ordered for today but there was not enough  fluid to tap.  BRADLEY with suspected HRS.  Appreciate renal's expertise.  Cr high but stable.  Coagulopathy- INR slowly rising and did not respond to vit K.  Hx of esophageal varices- banded 7/3 and due for repeat EGD.  No signs of overt GI bleeding, and EGD will be done as an outpatient.    Patient Active Problem List   Diagnosis    GI bleed    Secondary esophageal varices with bleeding (H)    ABLA (acute blood loss anemia)    Alcoholic cirrhosis of liver with ascites (H)    Alcoholism (H)    Lymphedema    Chronic systolic heart failure (H)    Loose stools    Restless legs syndrome (RLS)    Hyperbilirubinemia    Hyponatremia    Jaundice    Ascites due to alcoholic cirrhosis (H)    Leukocytosis, unspecified type    BRADLEY (acute kidney injury)    End stage liver disease (H)    Chronic diastolic heart failure (H)    Portal hypertension (H)    Secondary esophageal varices without bleeding (H)    Metabolic acidemia    Abdominal distension        Plan:    1. Continue lactulose and Xifaxan with a goal of 3 Bms/day.  2.  Fluids and diuretics per renal.  3.  Continue low NA diet.  4. Encourage long term sobriety.  5.  Outpatient Hep clinic follow-up as planned 9/12, and [facility] will contact him for EGD in 1-2 weeks.  6.  We will no longer actively follow in the hospital but please let us know if there are questions or concerns.    25 minutes of total time was spent providing patient care, including patient evaluation, reviewing documentation/test results and .                                                Kimberly Tiwari PA-C  Thank you for the opportunity to participate in the care of this patient.   Please feel free to call me with any questions or concerns.  Phone number [phone].

## 2025-08-08 NOTE — PROGRESS NOTES
CLINICAL NUTRITION SERVICES - REASSESSMENT NOTE     RECOMMENDATIONS FOR MDs/PROVIDERS TO ORDER:  None    Registered Dietitian Interventions:  Requested nsg document meal intake and supplement intake    Future/Additional Recommendations:  Adjust supplements pending intake, weight tolerance, acceptance, labs, GOC       INFORMATION OBTAINED  Patient not available for interview due to sound asleep with door open, loud knocks on door    CURRENT NUTRITION ORDERS  Diet: Orders Placed This Encounter      2 Gram Sodium Diet   1200 ml fluid restriction    Snacks/Supplements:   Ensure enlive daily  and special k bar tid = 890 kcal, 56 g protein/day      CURRENT INTAKE/TOLERANCE  Intake has not been documented past 3 days  Ordering 1-2 meal/day, with supplements at 1316 kcal- 1887 kcal, 68-96 g protein with average of 1600 kcal, 80 g protein    No left over supplements in room    IF pt is eating 100% of meals and supplements he is meeting 100% of estimated kcal, 80% of estimated protein needs on average     NEW FINDINGS  GI symptoms:   Soft BM 2-5/day on lactulose  Skin/wounds:   No concerns   Nutrition-relevant labs:   Na 130 (L) - decreased  Bun/cr 87/4.0 (H), trending up  Phos 4.9 (H), increased  LFT elevated, improved  Tbili 20.7 (H), improved  Nutrition-relevant medications:   lactulose daily, mvi wth minerals, aldactone daily, thiamine  Iv alb, iv bumex, iv abx, octreotide drip dc'd  Weight: last paracentesis 8/3. Trending down with duiresis. 2+ generalized, 3+ BLE edema, improved  Date/Time Weight Weight Method   08/08/25 0700 95.8 kg (211 lb 3.2 oz) Standing scale   08/07/25 0941 96.8 kg (213 lb 6.4 oz) Standing scale   08/06/25 0859 97.2 kg (214 lb 4.8 oz) Standing scale   08/02/25 0552 106 kg (233 lb 11 oz) Bed scale   08/01/25 1609 108.6 kg (239 lb 6.4 oz) Standing scale   08/01/25 0634 101.6 kg (223 lb 14.4 oz) Standing scale   07/16/25 : 115.2 kg (254 lb)  07/06/25 : 114.9 kg (253 lb 5 oz)  11/22/23: 108.5 kg (239  lb 4.8 oz)   13% wt loss in 2 weeks - Pt has frequent paracentesis, so difficult to tell true wt loss vs fluid loss. Unlikely pt has lost this much true wt this quickly    Dosing Weight: 80 kg, based on adjusted wt     ASSESSED NUTRITION NEEDS  Estimated Energy Needs: 8294-6442 kcals/day (20 - 25 kcals/kg)  Justification: Obese  Estimated Protein Needs:  grams protein/day (1.2 - 1.5 grams of pro/kg)  Justification: Increased needs with cirrhosis  Estimated Fluid Needs: <1200 mL/day   Justification: On a fluid restriction    MALNUTRITION  % Intake: <75% > 1 month (severe)- improving  % Weight Loss: > 5% in 1 month (severe)   Subcutaneous Fat Loss: Orbital: moderate  Muscle Loss: Tricep moderate; deltoid moderate los, clavicle mild loss  Fluid Accumulation/Edema: Moderate 2+-4+   Malnutrition Diagnosis: Severe malnutrition in the context of chronic illness or injury  Malnutrition Present on Admission: Yes    EVALUATION OF THE PROGRESS TOWARD GOALS     NUTRITION DIAGNOSIS  Malnutrition (undernutrition) related to liver cirrhosis, frequent paracentesis as evidenced by >5% wt loss in 1 month, 4+ edema in BLE, intake <50% for 4 months- improving      GOALS  Total avg nutritional intake to meet a minimum of 20 kcal/kg and 1.2 g PRO/kg daily (per dosing wt 80 kg). - unable to assess d/t no intake documentation     MONITORING/EVALUATION  Progress toward goals will be monitored and evaluated per policy.

## 2025-08-08 NOTE — PLAN OF CARE
PRIMARY DIAGNOSIS: SOFT TISSUE INFECTIONS  OUTPATIENT/OBSERVATION GOALS TO BE MET BEFORE DISCHARGE:  Vitals sign stable or return to baseline: Yes    Tolerating oral antibiotics or has home infusion set up if applicable: No    Pain status: Improved with use of alternative comfort measures i.e.: positioning and distraction using conversation, music, and podcasts.    Return to near baseline physical activity: No    Discharge Planner Nurse   Safe discharge environment identified: No  Barriers to discharge: Yes       Entered by: Neyda Tapia RN 08/07/2025 9:59 PM     Please review provider order for any additional goals.     Nurse to notify provider when observation goals have been met and patient is ready for discharge.Goal Outcome Evaluation:                    Pt denies pain this shift. Complaints about stools that a very soft, BM x3 today. Erazo out at 1430, voided x4 (450ml), PVR-120ml and 20ml.States he had a brief nosebleed that resolved with pressure. States these are normally frequent. No current bleeding at this time.

## 2025-08-08 NOTE — PROGRESS NOTES
RENAL PROGRESS NOTE    ASSESSMENT & PLAN:   ARF: Baseline normal renal fxn, bland UA last month and previously normal renal anatomy on imaging. Creat 0.8 7/17/25.   Non oliguric BRADLEY, severe volume overload, soft BP related to liver disease. Suspect HRS BRADLEY but also acute infx / SBP, tense ascites and HFmrHF contributing hemodynamic factors.   (UA bland, FENA and Kristie low c/w HRS BRADLEY/ prerenal physiology (CRS/ infx))  HRS treated with IV albumin, midodrine, octreotide, diuretics  Recs:  Cr stalled around 4, adequate diuresis, wts coming down nicely and becoming much less edematous, abd US today with minimal ascites   Decrease Torsemide to 20 mg daily  Resume Aldactone 25 mg daily   Continue midodrine to 10 mg 4 times daily  Protein supplements ordered  50 g alb x1  FR, low-sodium diet  Daily RFP  If cr down trending tomorrow could likely discharge with close out pt follow up      ESLD: acute decompensation with SBP  Chronic due to ETOH, reports abstinent since July 1, not candidate for Tx eval.  Severe portal HTN, ascites and varices/ coagulopathy. Bili better  No severe encephalopathy or other new complications.      SBP: Treated with ceftriaxone     Severe hyponatremia: mild in past and now severe due to ADH activation and ARF. 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 hemoglobin stable in the 10 range     Coagulopathy: INR 1.9-2.0, didn't respond to VitK        SUBJECTIVE:    Feels about the same  Erazo out yesterday and able to void well  Continues to have an adequate diuresis  OT placing LE wraps during visit   LE edema improving     OBJECTIVE:  Physical Exam   Temp: 98.3  F (36.8  C) Temp src: Oral BP: 112/58 Pulse: 67   Resp:  20 SpO2: 97 % O2 Device: None (Room air)    Vitals:    08/06/25 0859 08/07/25 0941 08/08/25 0700   Weight: 97.2 kg (214 lb 4.8 oz) 96.8 kg (213 lb 6.4 oz) 95.8 kg (211 lb 3.2 oz)     Vital Signs with Ranges  Temp:  [97.7  F (36.5  C)-98.3  F (36.8  C)] 98.3  F (36.8  C)  Pulse:  [59-69] 67  Resp:  [18-20] 20  BP: ()/(50-59) 112/58  SpO2:  [94 %-98 %] 97 %  I/O last 3 completed shifts:  In: 490 [P.O.:490]  Out: 1750 [Urine:1750]    Patient Vitals for the past 72 hrs:   Weight   08/08/25 0700 95.8 kg (211 lb 3.2 oz)   08/07/25 0941 96.8 kg (213 lb 6.4 oz)   08/06/25 0859 97.2 kg (214 lb 4.8 oz)     Intake/Output Summary (Last 24 hours) at 8/6/2025 1336  Last data filed at 8/6/2025 0853  Gross per 24 hour   Intake 600 ml   Output 1850 ml   Net -1250 ml       PHYSICAL EXAM:  General - A & O x 3, NAD jaundice   HEENT - +icteric sclerae   Respiratory - Lungs CTA bilat   Cardiovascular - AP RRR with murmur all precordium  Abdomen - mildly distended   Extremities - well perfused, 2+ pitting now into calves  Neurologic - grossly intact no myoclonus  Psych:  Judgement intact, affect WNL    LABORATORY STUDIES:     Recent Labs   Lab 08/06/25  0551 08/05/25  0526 08/03/25  0636 08/02/25  0606   WBC  --  10.8 8.9 11.7*   RBC  --  3.10* 2.83* 3.06*   HGB 10.6* 10.6* 9.6* 10.4*   HCT  --  29.0* 26.3* 28.4*   PLT  --  157 158 169       Basic Metabolic Panel:  Recent Labs   Lab 08/08/25  1238 08/08/25  0547 08/07/25  0603 08/06/25  0551 08/05/25  0526 08/04/25  2202 08/04/25  2118 08/04/25  1449 08/04/25  0542 08/03/25  0946 08/03/25  0636   NA  --  130* 131* 128* 128* 127*  --  127* 126*  126*   < > 125*  125*   POTASSIUM 3.4 3.4 3.5 3.5 3.5  3.5 3.3*  --   --  3.2*  --  3.8   CHLORIDE  --  90* 91* 89* 88*  --   --   --  89*  --  88*   CO2  --  22 24 21* 23  --   --   --  22  --  20*   BUN  --  87.1* 85.2* 84.1* 82.5*  --   --   --  79.6*  --  80.0*   CR  --  4.09* 4.05* 4.01* 3.59*  --   --   --  3.67*  --  3.91*   GLC  --   161* 148* 180* 177*  --  136*  --  162*  --  122*   ANDRES  --  9.3 9.6 9.4 9.2  --   --   --  9.0  --  8.5*    < > = values in this interval not displayed.       INR  Recent Labs   Lab 08/05/25 0526 08/03/25 0636   INR 2.06* 2.01*        Recent Labs   Lab Test 08/06/25  0551 08/05/25 0526 08/03/25 0636   INR  --  2.06* 2.01*   WBC  --  10.8 8.9   HGB 10.6* 10.6* 9.6*   PLT  --  157 158       Personally reviewed current labs    This note was dictated using voice recognition     Cornelio Irving PA-C  [facility]  [phone]

## 2025-08-09 VITALS
HEART RATE: 60 BPM | BODY MASS INDEX: 31.17 KG/M2 | DIASTOLIC BLOOD PRESSURE: 50 MMHG | TEMPERATURE: 98.3 F | WEIGHT: 211.2 LBS | RESPIRATION RATE: 20 BRPM | SYSTOLIC BLOOD PRESSURE: 110 MMHG | OXYGEN SATURATION: 96 %

## 2025-08-09 LAB
ALBUMIN SERPL BCG-MCNC: 3.6 G/DL (ref 3.5–5.2)
ALP SERPL-CCNC: 144 U/L (ref 40–150)
ALT SERPL W P-5'-P-CCNC: 77 U/L (ref 0–70)
ANION GAP SERPL CALCULATED.3IONS-SCNC: 18 MMOL/L (ref 7–15)
AST SERPL W P-5'-P-CCNC: 119 U/L (ref 0–45)
BASOPHILS # BLD AUTO: 0.1 10E3/UL (ref 0–0.2)
BASOPHILS NFR BLD AUTO: 1 %
BILIRUB SERPL-MCNC: 20.6 MG/DL
BUN SERPL-MCNC: 89.8 MG/DL (ref 8–23)
CALCIUM SERPL-MCNC: 9.8 MG/DL (ref 8.8–10.4)
CHLORIDE SERPL-SCNC: 90 MMOL/L (ref 98–107)
CREAT SERPL-MCNC: 3.92 MG/DL (ref 0.67–1.17)
EGFRCR SERPLBLD CKD-EPI 2021: 15 ML/MIN/1.73M2
EOSINOPHIL # BLD AUTO: 0.4 10E3/UL (ref 0–0.7)
EOSINOPHIL NFR BLD AUTO: 3 %
ERYTHROCYTE [DISTWIDTH] IN BLOOD BY AUTOMATED COUNT: 15.4 % (ref 10–15)
GLUCOSE SERPL-MCNC: 161 MG/DL (ref 70–99)
HCO3 SERPL-SCNC: 23 MMOL/L (ref 22–29)
HCT VFR BLD AUTO: 26.9 % (ref 40–53)
HGB BLD-MCNC: 9.8 G/DL (ref 13.3–17.7)
IMM GRANULOCYTES # BLD: 0.1 10E3/UL
IMM GRANULOCYTES NFR BLD: 1 %
LYMPHOCYTES # BLD AUTO: 0.7 10E3/UL (ref 0.8–5.3)
LYMPHOCYTES NFR BLD AUTO: 6 %
MAGNESIUM SERPL-MCNC: 2 MG/DL (ref 1.7–2.3)
MCH RBC QN AUTO: 34.1 PG (ref 26.5–33)
MCHC RBC AUTO-ENTMCNC: 36.4 G/DL (ref 31.5–36.5)
MCV RBC AUTO: 94 FL (ref 78–100)
MONOCYTES # BLD AUTO: 0.7 10E3/UL (ref 0–1.3)
MONOCYTES NFR BLD AUTO: 6 %
NEUTROPHILS # BLD AUTO: 9.8 10E3/UL (ref 1.6–8.3)
NEUTROPHILS NFR BLD AUTO: 83 %
NRBC # BLD AUTO: 0 10E3/UL
NRBC BLD AUTO-RTO: 0 /100
PLATELET # BLD AUTO: 134 10E3/UL (ref 150–450)
POTASSIUM SERPL-SCNC: 3.6 MMOL/L (ref 3.4–5.3)
PROT SERPL-MCNC: 6 G/DL (ref 6.4–8.3)
RBC # BLD AUTO: 2.87 10E6/UL (ref 4.4–5.9)
SODIUM SERPL-SCNC: 131 MMOL/L (ref 135–145)
WBC # BLD AUTO: 11.8 10E3/UL (ref 4–11)

## 2025-08-09 PROCEDURE — 83735 ASSAY OF MAGNESIUM: CPT | Performed by: EMERGENCY MEDICINE

## 2025-08-09 PROCEDURE — 250N000013 HC RX MED GY IP 250 OP 250 PS 637: Performed by: INTERNAL MEDICINE

## 2025-08-09 PROCEDURE — 85018 HEMOGLOBIN: CPT | Performed by: PHYSICIAN ASSISTANT

## 2025-08-09 PROCEDURE — 250N000013 HC RX MED GY IP 250 OP 250 PS 637: Performed by: HOSPITALIST

## 2025-08-09 PROCEDURE — 250N000013 HC RX MED GY IP 250 OP 250 PS 637: Performed by: PHYSICIAN ASSISTANT

## 2025-08-09 PROCEDURE — 36415 COLL VENOUS BLD VENIPUNCTURE: CPT | Performed by: PHYSICIAN ASSISTANT

## 2025-08-09 PROCEDURE — 82040 ASSAY OF SERUM ALBUMIN: CPT | Performed by: PHYSICIAN ASSISTANT

## 2025-08-09 PROCEDURE — 99232 SBSQ HOSP IP/OBS MODERATE 35: CPT | Performed by: INTERNAL MEDICINE

## 2025-08-09 PROCEDURE — 250N000013 HC RX MED GY IP 250 OP 250 PS 637: Performed by: STUDENT IN AN ORGANIZED HEALTH CARE EDUCATION/TRAINING PROGRAM

## 2025-08-09 RX ORDER — LOPERAMIDE HYDROCHLORIDE 2 MG/1
2 CAPSULE ORAL 3 TIMES DAILY PRN
Qty: 15 CAPSULE | Refills: 0 | Status: ON HOLD | OUTPATIENT
Start: 2025-08-09

## 2025-08-09 RX ORDER — GABAPENTIN 300 MG/1
300 CAPSULE ORAL
Status: ON HOLD | COMMUNITY
Start: 2025-08-09

## 2025-08-09 RX ORDER — MIDODRINE HYDROCHLORIDE 10 MG/1
10 TABLET ORAL 4 TIMES DAILY
Qty: 120 TABLET | Refills: 1 | Status: ON HOLD | OUTPATIENT
Start: 2025-08-09

## 2025-08-09 RX ORDER — POTASSIUM CHLORIDE 1.5 G/1.58G
20 POWDER, FOR SOLUTION ORAL DAILY
Qty: 30 PACKET | Refills: 1 | Status: SHIPPED | OUTPATIENT
Start: 2025-08-10 | End: 2025-08-11

## 2025-08-09 RX ORDER — TORSEMIDE 20 MG/1
20 TABLET ORAL DAILY
Qty: 60 TABLET | Refills: 1 | Status: ON HOLD | OUTPATIENT
Start: 2025-08-10

## 2025-08-09 RX ORDER — POTASSIUM CHLORIDE 1.5 G/1.58G
20 POWDER, FOR SOLUTION ORAL DAILY
Status: DISCONTINUED | OUTPATIENT
Start: 2025-08-10 | End: 2025-08-09 | Stop reason: HOSPADM

## 2025-08-09 RX ORDER — LACTULOSE 10 G/15ML
10 SOLUTION ORAL DAILY
Qty: 473 ML | Refills: 1 | Status: ON HOLD | OUTPATIENT
Start: 2025-08-10

## 2025-08-09 RX ADMIN — MIDODRINE HYDROCHLORIDE 10 MG: 5 TABLET ORAL at 09:02

## 2025-08-09 RX ADMIN — LACTULOSE SOLUTION USP, 10 G/15 ML 10 G: 10 SOLUTION ORAL; RECTAL at 09:02

## 2025-08-09 RX ADMIN — MIDODRINE HYDROCHLORIDE 10 MG: 5 TABLET ORAL at 13:22

## 2025-08-09 RX ADMIN — SPIRONOLACTONE 25 MG: 25 TABLET, FILM COATED ORAL at 09:02

## 2025-08-09 RX ADMIN — GABAPENTIN 300 MG: 300 CAPSULE ORAL at 09:07

## 2025-08-09 RX ADMIN — RIFAXIMIN 550 MG: 550 TABLET ORAL at 09:02

## 2025-08-09 RX ADMIN — RIFAXIMIN 550 MG: 550 TABLET ORAL at 13:22

## 2025-08-09 RX ADMIN — THIAMINE HCL TAB 100 MG 100 MG: 100 TAB at 09:07

## 2025-08-09 RX ADMIN — Medication 1 TABLET: at 09:02

## 2025-08-09 RX ADMIN — TORSEMIDE 20 MG: 20 TABLET ORAL at 09:02

## 2025-08-09 ASSESSMENT — ACTIVITIES OF DAILY LIVING (ADL)
ADLS_ACUITY_SCORE: 45
ADLS_ACUITY_SCORE: 43
ADLS_ACUITY_SCORE: 43
ADLS_ACUITY_SCORE: 45
ADLS_ACUITY_SCORE: 43
ADLS_ACUITY_SCORE: 43
ADLS_ACUITY_SCORE: 45

## 2025-08-09 NOTE — PLAN OF CARE
Occupational Therapy Discharge Summary    Reason for therapy discharge:    Discharged to home.    Progress towards therapy goal(s). See goals on Care Plan in [facility] electronic health record for goal details.  Goals partially met.  Barriers to achieving goals:   discharge from facility.    Therapy recommendation(s):    Recommend home with assist

## 2025-08-09 NOTE — PROGRESS NOTES
Care Management Discharge Note    Discharge Date: 08/09/2025       Discharge Disposition: Home, Home Care - [facility]    Discharge Services: Home Care - [facility] for PT    Discharge DME: None    Discharge Transportation: family or friend will provide    Is transportation arrangement complete? No    Private pay costs discussed: Not applicable    Does the patient's insurance plan have a 3 day qualifying hospital stay waiver?  No    PAS Confirmation Code: NA  Patient/family educated on Medicare website which has current facility and service quality ratings: NA    Education Provided on the Discharge Plan: Yes per team  Persons Notified of Discharge Plans: Patient  Patient/Family in Agreement with the Plan: yes    Handoff Referral Completed: No, handoff not indicated or clinically appropriate    Additional Information:  Patient discharge to home with home care - [facility] for PT. Family will transport.    Orders sent to [facility]    Tiffani Dolan RN  Acute Care Management  [facility]  For further questions/concerns you can reach us at Vocera Web Console

## 2025-08-09 NOTE — PLAN OF CARE
Physical Therapy Discharge Summary    Reason for therapy discharge:    Discharged to home with home therapy.    Progress towards therapy goal(s). See goals on Care Plan in [facility] electronic health record for goal details.  Goals partially met.  Barriers to achieving goals:   discharge from facility.    Therapy recommendation(s):    Continued therapy is recommended.  Rationale/Recommendations:  Home PT recommended.

## 2025-08-09 NOTE — PROGRESS NOTES
RENAL PROGRESS NOTE    ASSESSMENT & PLAN:   ARF: Baseline normal renal fxn, bland UA last month and previously normal renal anatomy on imaging. Creat 0.8 7/17/25.   Non oliguric BRADLEY, severe volume overload, soft BP related to liver disease. Suspect HRS BRADLEY but also acute infx / SBP, tense ascites and HFmrHF contributing hemodynamic factors.   (UA bland, FENA and Kristie low c/w HRS BRADLEY/ prerenal physiology (CRS/ infx))  HRS treated with IV albumin, midodrine, octreotide, diuretics  Cr stalled around 4, adequate diuresis, wts coming down nicely and becoming much less edematous, abd US yesterday with minimal ascites   Recs:  - I think reasonable to discharge  - discharge on Torsemide to 20 mg daily  -  Aldactone 25 mg daily   -  midodrine to 10 mg 4 times daily  - add 20meq Kcl daily  - he is seeing PCP on Monday, can do labs at that time  - will try to get him into our clinic next week for rapid follow up     ESLD: acute decompensation with SBP  Chronic due to ETOH, reports abstinent since July 1, not candidate for Tx eval.  Severe portal HTN, ascites and varices/ coagulopathy. Bili better  No severe encephalopathy or other new complications.      SBP: Treated with ceftriaxone     Severe hyponatremia: mild in past and now severe due to ADH activation and ARF. Trending better s/p albumin and ongoing diuresis     Hypokalemia: In setting of diuresis, replacing and adding back aldactone.  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 hemoglobin stable in the 10 range     Coagulopathy: INR 1.9-2.0, didn't respond to VitK        SUBJECTIVE:    Desperate to discharge, very board  Reviewed there is always some risk but weight and Cr are stable  Discussed torsemide, midodrine, spironolactone and  midodrine  Will be seeing new PCP on Monday  I will have my clinic call him Monday to get him into our clinic    OBJECTIVE:  Physical Exam   Temp: 97.7  F (36.5  C) Temp src: Oral BP: 102/59 Pulse: 67   Resp: 18 SpO2: 96 % O2 Device: None (Room air)    Vitals:    08/07/25 0941 08/08/25 0700 08/09/25 0640   Weight: 96.8 kg (213 lb 6.4 oz) 95.8 kg (211 lb 3.2 oz) (P) 96 kg (211 lb 10.3 oz)     Vital Signs with Ranges  Temp:  [97.7  F (36.5  C)-98.3  F (36.8  C)] 97.7  F (36.5  C)  Pulse:  [60-67] 67  Resp:  [18-20] 18  BP: (102-112)/(52-59) 102/59  SpO2:  [96 %-97 %] 96 %  I/O last 3 completed shifts:  In: 511 [P.O.:511]  Out: 1600 [Urine:1600]    Patient Vitals for the past 72 hrs:   Weight   08/09/25 0640 (P) 96 kg (211 lb 10.3 oz)   08/08/25 0700 95.8 kg (211 lb 3.2 oz)   08/07/25 0941 96.8 kg (213 lb 6.4 oz)     Intake/Output Summary (Last 24 hours) at 8/6/2025 1336  Last data filed at 8/6/2025 0853  Gross per 24 hour   Intake 600 ml   Output 1850 ml   Net -1250 ml       PHYSICAL EXAM:  General - A & O x 3, NAD jaundice   HEENT - +icteric sclerae   Respiratory - Lungs CTA bilat   Cardiovascular - AP RRR with murmur all precordium  Abdomen - mildly distended   Extremities - well perfused, 2+ pitting now into calves  Neurologic - grossly intact no myoclonus  Psych:  Judgement intact, affect WNL    LABORATORY STUDIES:     Recent Labs   Lab 08/09/25  0703 08/06/25  0551 08/05/25  0526 08/03/25  0636   WBC 11.8*  --  10.8 8.9   RBC 2.87*  --  3.10* 2.83*   HGB 9.8* 10.6* 10.6* 9.6*   HCT 26.9*  --  29.0* 26.3*   *  --  157 158       Basic Metabolic Panel:  Recent Labs   Lab 08/09/25  0703 08/08/25 2111 08/08/25  1238 08/08/25  0547 08/07/25  0603 08/06/25  0551 08/05/25  0526 08/04/25  2202 08/04/25 2118 08/04/25  1449 08/04/25  0542   *  --   --  130* 131* 128* 128* 127*  --    < > 126*  126*   POTASSIUM 3.6 3.9 3.4 3.4 3.5 3.5 3.5  3.5 3.3*  --   --  3.2*   CHLORIDE 90*  --   --  90* 91* 89* 88*  --    --   --  89*   CO2 23  --   --  22 24 21* 23  --   --   --  22   BUN 89.8*  --   --  87.1* 85.2* 84.1* 82.5*  --   --   --  79.6*   CR 3.92*  --   --  4.09* 4.05* 4.01* 3.59*  --   --   --  3.67*   *  --   --  161* 148* 180* 177*  --  136*  --  162*   ANDRES 9.8  --   --  9.3 9.6 9.4 9.2  --   --   --  9.0    < > = values in this interval not displayed.       INR  Recent Labs   Lab 08/05/25  0526 08/03/25  0636   INR 2.06* 2.01*        Recent Labs   Lab Test 08/09/25  0703 08/06/25  0551 08/05/25  0526 08/03/25  0636   INR  --   --  2.06* 2.01*   WBC 11.8*  --  10.8 8.9   HGB 9.8* 10.6* 10.6* 9.6*   *  --  157 158       Personally reviewed current labs    [place]  [facility]  [phone]

## 2025-08-09 NOTE — PROGRESS NOTES
Pt left with wife after receiving walker from Physical Therapy, with all belongings. Discharge instructions were reviewed with day nurse.

## 2025-08-09 NOTE — PLAN OF CARE
Problem: Adult Inpatient Plan of Care  Goal: Plan of Care Review  Description: The Plan of Care Review/Shift note should be completed every shift.  The Outcome Evaluation is a brief statement about your assessment that the patient is improving, declining, or no change.  This information will be displayed automatically on your shift  note.  Outcome: Progressing  Flowsheets (Taken 8/8/2025 3751)  Plan of Care Reviewed With: patient  Overall Patient Progress: improving  Goal: Optimal Comfort and Wellbeing  Outcome: Progressing  Intervention: Provide Person-Centered Care  Recent Flowsheet Documentation  Taken 8/8/2025 1538 by Milady Vidales, RN  Trust Relationship/Rapport:   care explained   choices provided     Problem: Pain Acute  Goal: Optimal Pain Control and Function  Outcome: Progressing  Intervention: Prevent or Manage Pain  Recent Flowsheet Documentation  Taken 8/8/2025 1538 by Milady Vidales, RN  Sensory Stimulation Regulation: television on  Medication Review/Management: medications reviewed     Problem: Gas Exchange Impaired  Goal: Optimal Gas Exchange  Outcome: Progressing   Goal Outcome Evaluation:      Plan of Care Reviewed With: patient    Overall Patient Progress: improvingOverall Patient Progress: improving     Pt alert and oriented x 4, makes needs known. VSS, room air. Denies pain. Lymph wraps in place on both legs. 1200 ml fluid restriction and 2g sodium diet. Magnesium and Potassium protocol, recheck in am.

## 2025-08-09 NOTE — PLAN OF CARE
Problem: Delirium  Goal: Optimal Coping  Outcome: Progressing  Goal: Improved Behavioral Control  Outcome: Progressing  Intervention: Minimize Safety Risk  Recent Flowsheet Documentation  Taken 8/9/2025 0100 by Malcom Hernandez RN  Trust Relationship/Rapport: care explained  Goal: Improved Attention and Thought Clarity  Outcome: Progressing  Goal: Improved Sleep  Outcome: Progressing     Problem: Pain Acute  Goal: Optimal Pain Control and Function  Outcome: Progressing   Goal Outcome Evaluation:       Patient resting comfortably. No complaints of pain.

## 2025-08-09 NOTE — PLAN OF CARE
Goal Outcome Evaluation:         Problem: Adult Inpatient Plan of Care  Goal: Absence of Hospital-Acquired Illness or Injury  Intervention: Prevent Skin Injury  Recent Flowsheet Documentation  Taken 8/9/2025 0953 by Sigrid Khan, RN  Body Position: position changed independently     Problem: Delirium  Goal: Optimal Coping  Outcome: Progressing     Problem: Pain Acute  Goal: Optimal Pain Control and Function  Outcome: Progressing  Intervention: Prevent or Manage Pain  Recent Flowsheet Documentation  Taken 8/9/2025 0953 by Sigrid Khan, RN  Medication Review/Management: medications reviewed     Problem: Gas Exchange Impaired  Goal: Optimal Gas Exchange  Outcome: Progressing  Intervention: Optimize Oxygenation and Ventilation  Recent Flowsheet Documentation  Taken 8/9/2025 0953 by Sigrid Khan, RN  Head of Bed (HOB) Positioning: HOB at 90 degrees     Problem: Liver Failure  Goal: Optimal Coping with Liver Failure  Outcome: Progressing         Pt is A/O, denied pain, will be discharging home, wife picking up, with discharge medication sent to pharmacy

## 2025-08-10 ENCOUNTER — HOSPITAL ENCOUNTER (INPATIENT)
Facility: HOSPITAL | Age: 75
DRG: 441 | End: 2025-08-10
Attending: STUDENT IN AN ORGANIZED HEALTH CARE EDUCATION/TRAINING PROGRAM | Admitting: STUDENT IN AN ORGANIZED HEALTH CARE EDUCATION/TRAINING PROGRAM
Payer: COMMERCIAL

## 2025-08-10 DIAGNOSIS — K92.2 GASTROINTESTINAL HEMORRHAGE, UNSPECIFIED GASTROINTESTINAL HEMORRHAGE TYPE: ICD-10-CM

## 2025-08-10 DIAGNOSIS — E87.5 HYPERKALEMIA: ICD-10-CM

## 2025-08-10 DIAGNOSIS — K74.60 DECOMPENSATION OF CIRRHOSIS OF LIVER (H): ICD-10-CM

## 2025-08-10 DIAGNOSIS — R53.83 MALAISE AND FATIGUE: ICD-10-CM

## 2025-08-10 DIAGNOSIS — E87.1 HYPONATREMIA: Primary | ICD-10-CM

## 2025-08-10 DIAGNOSIS — K72.90 DECOMPENSATION OF CIRRHOSIS OF LIVER (H): ICD-10-CM

## 2025-08-10 DIAGNOSIS — I85.11 SECONDARY ESOPHAGEAL VARICES WITH BLEEDING (H): ICD-10-CM

## 2025-08-10 DIAGNOSIS — R11.2 NAUSEA AND VOMITING, UNSPECIFIED VOMITING TYPE: ICD-10-CM

## 2025-08-10 DIAGNOSIS — R42 LIGHTHEADEDNESS: ICD-10-CM

## 2025-08-10 DIAGNOSIS — R65.10 SIRS (SYSTEMIC INFLAMMATORY RESPONSE SYNDROME) (H): ICD-10-CM

## 2025-08-10 DIAGNOSIS — R53.81 MALAISE AND FATIGUE: ICD-10-CM

## 2025-08-10 LAB
AMMONIA PLAS-SCNC: 58 UMOL/L (ref 16–60)
BASE EXCESS BLDV CALC-SCNC: 0 MMOL/L (ref -3–3)
BASOPHILS # BLD AUTO: 0.2 10E3/UL (ref 0–0.2)
BASOPHILS NFR BLD AUTO: 1 %
EOSINOPHIL # BLD AUTO: 0.4 10E3/UL (ref 0–0.7)
EOSINOPHIL NFR BLD AUTO: 2 %
ERYTHROCYTE [DISTWIDTH] IN BLOOD BY AUTOMATED COUNT: 15.8 % (ref 10–15)
HCO3 BLDV-SCNC: 25 MMOL/L (ref 21–28)
HCT VFR BLD AUTO: 29.1 % (ref 40–53)
HGB BLD-MCNC: 9.8 G/DL (ref 13.3–17.7)
IMM GRANULOCYTES # BLD: 0.3 10E3/UL
IMM GRANULOCYTES NFR BLD: 1 %
INR PPP: 2.01 (ref 0.85–1.15)
LACTATE SERPL-SCNC: 4.3 MMOL/L (ref 0.7–2)
LYMPHOCYTES # BLD AUTO: 1 10E3/UL (ref 0.8–5.3)
LYMPHOCYTES NFR BLD AUTO: 4 %
MCH RBC QN AUTO: 33.3 PG (ref 26.5–33)
MCHC RBC AUTO-ENTMCNC: 33.7 G/DL (ref 31.5–36.5)
MCV RBC AUTO: 99 FL (ref 78–100)
MONOCYTES # BLD AUTO: 1.2 10E3/UL (ref 0–1.3)
MONOCYTES NFR BLD AUTO: 5 %
NEUTROPHILS # BLD AUTO: 21.2 10E3/UL (ref 1.6–8.3)
NEUTROPHILS NFR BLD AUTO: 87 %
NRBC # BLD AUTO: 0 10E3/UL
NRBC BLD AUTO-RTO: 0 /100
O2/TOTAL GAS SETTING VFR VENT: 21 %
OXYHGB MFR BLDV: 51 % (ref 70–75)
PCO2 BLDV: 40 MM HG (ref 40–50)
PH BLDV: 7.4 [PH] (ref 7.32–7.43)
PLATELET # BLD AUTO: 235 10E3/UL (ref 150–450)
PO2 BLDV: 31 MM HG (ref 25–47)
PROTHROMBIN TIME: 22.8 SECONDS (ref 11.8–14.8)
RBC # BLD AUTO: 2.94 10E6/UL (ref 4.4–5.9)
SAO2 % BLDV: 52.2 % (ref 70–75)
WBC # BLD AUTO: 24.2 10E3/UL (ref 4–11)

## 2025-08-10 PROCEDURE — 83690 ASSAY OF LIPASE: CPT | Performed by: STUDENT IN AN ORGANIZED HEALTH CARE EDUCATION/TRAINING PROGRAM

## 2025-08-10 PROCEDURE — 36415 COLL VENOUS BLD VENIPUNCTURE: CPT | Performed by: STUDENT IN AN ORGANIZED HEALTH CARE EDUCATION/TRAINING PROGRAM

## 2025-08-10 PROCEDURE — 82140 ASSAY OF AMMONIA: CPT | Performed by: STUDENT IN AN ORGANIZED HEALTH CARE EDUCATION/TRAINING PROGRAM

## 2025-08-10 PROCEDURE — 82805 BLOOD GASES W/O2 SATURATION: CPT | Performed by: STUDENT IN AN ORGANIZED HEALTH CARE EDUCATION/TRAINING PROGRAM

## 2025-08-10 PROCEDURE — 93005 ELECTROCARDIOGRAM TRACING: CPT | Performed by: STUDENT IN AN ORGANIZED HEALTH CARE EDUCATION/TRAINING PROGRAM

## 2025-08-10 PROCEDURE — 96360 HYDRATION IV INFUSION INIT: CPT | Mod: 59

## 2025-08-10 PROCEDURE — 85004 AUTOMATED DIFF WBC COUNT: CPT | Performed by: STUDENT IN AN ORGANIZED HEALTH CARE EDUCATION/TRAINING PROGRAM

## 2025-08-10 PROCEDURE — 82248 BILIRUBIN DIRECT: CPT | Performed by: STUDENT IN AN ORGANIZED HEALTH CARE EDUCATION/TRAINING PROGRAM

## 2025-08-10 PROCEDURE — 85610 PROTHROMBIN TIME: CPT | Performed by: STUDENT IN AN ORGANIZED HEALTH CARE EDUCATION/TRAINING PROGRAM

## 2025-08-10 PROCEDURE — 82947 ASSAY GLUCOSE BLOOD QUANT: CPT | Performed by: STUDENT IN AN ORGANIZED HEALTH CARE EDUCATION/TRAINING PROGRAM

## 2025-08-10 PROCEDURE — 99285 EMERGENCY DEPT VISIT HI MDM: CPT | Mod: 25

## 2025-08-10 RX ORDER — ONDANSETRON 2 MG/ML
4 INJECTION INTRAMUSCULAR; INTRAVENOUS EVERY 30 MIN PRN
Status: DISCONTINUED | OUTPATIENT
Start: 2025-08-10 | End: 2025-08-11

## 2025-08-10 ASSESSMENT — COLUMBIA-SUICIDE SEVERITY RATING SCALE - C-SSRS
6. HAVE YOU EVER DONE ANYTHING, STARTED TO DO ANYTHING, OR PREPARED TO DO ANYTHING TO END YOUR LIFE?: NO
2. HAVE YOU ACTUALLY HAD ANY THOUGHTS OF KILLING YOURSELF IN THE PAST MONTH?: NO
1. IN THE PAST MONTH, HAVE YOU WISHED YOU WERE DEAD OR WISHED YOU COULD GO TO SLEEP AND NOT WAKE UP?: NO

## 2025-08-11 ENCOUNTER — APPOINTMENT (OUTPATIENT)
Dept: OCCUPATIONAL THERAPY | Facility: HOSPITAL | Age: 75
DRG: 441 | End: 2025-08-11
Attending: STUDENT IN AN ORGANIZED HEALTH CARE EDUCATION/TRAINING PROGRAM
Payer: COMMERCIAL

## 2025-08-11 ENCOUNTER — APPOINTMENT (OUTPATIENT)
Dept: ULTRASOUND IMAGING | Facility: HOSPITAL | Age: 75
DRG: 441 | End: 2025-08-11
Attending: STUDENT IN AN ORGANIZED HEALTH CARE EDUCATION/TRAINING PROGRAM
Payer: COMMERCIAL

## 2025-08-11 ENCOUNTER — APPOINTMENT (OUTPATIENT)
Dept: CARDIOLOGY | Facility: HOSPITAL | Age: 75
DRG: 441 | End: 2025-08-11
Attending: INTERNAL MEDICINE
Payer: COMMERCIAL

## 2025-08-11 ENCOUNTER — APPOINTMENT (OUTPATIENT)
Dept: CT IMAGING | Facility: HOSPITAL | Age: 75
DRG: 441 | End: 2025-08-11
Attending: STUDENT IN AN ORGANIZED HEALTH CARE EDUCATION/TRAINING PROGRAM
Payer: COMMERCIAL

## 2025-08-11 ENCOUNTER — APPOINTMENT (OUTPATIENT)
Dept: PHYSICAL THERAPY | Facility: HOSPITAL | Age: 75
DRG: 441 | End: 2025-08-11
Attending: STUDENT IN AN ORGANIZED HEALTH CARE EDUCATION/TRAINING PROGRAM
Payer: COMMERCIAL

## 2025-08-11 LAB
ALBUMIN BODY FLUID SOURCE: NORMAL
ALBUMIN FLD-MCNC: 1.1 G/DL
ALBUMIN SERPL BCG-MCNC: 3.3 G/DL (ref 3.5–5.2)
ALBUMIN SERPL BCG-MCNC: 3.6 G/DL (ref 3.5–5.2)
ALBUMIN UR-MCNC: NEGATIVE MG/DL
ALP SERPL-CCNC: 124 U/L (ref 40–150)
ALP SERPL-CCNC: 138 U/L (ref 40–150)
ALT SERPL W P-5'-P-CCNC: 73 U/L (ref 0–70)
ALT SERPL W P-5'-P-CCNC: 81 U/L (ref 0–70)
ANION GAP SERPL CALCULATED.3IONS-SCNC: 17 MMOL/L (ref 7–15)
ANION GAP SERPL CALCULATED.3IONS-SCNC: 19 MMOL/L (ref 7–15)
APPEARANCE FLD: ABNORMAL
APPEARANCE UR: CLEAR
AST SERPL W P-5'-P-CCNC: 115 U/L (ref 0–45)
AST SERPL W P-5'-P-CCNC: 125 U/L (ref 0–45)
BACTERIA #/AREA URNS HPF: ABNORMAL /HPF
BILIRUB DIRECT SERPL-MCNC: 12.81 MG/DL (ref 0–0.3)
BILIRUB SERPL-MCNC: 19.2 MG/DL
BILIRUB SERPL-MCNC: 21.2 MG/DL
BILIRUB UR QL STRIP: ABNORMAL
BUN SERPL-MCNC: 110.5 MG/DL (ref 8–23)
BUN SERPL-MCNC: 123 MG/DL (ref 8–23)
CALCIUM SERPL-MCNC: 9.5 MG/DL (ref 8.8–10.4)
CALCIUM SERPL-MCNC: 9.7 MG/DL (ref 8.8–10.4)
CHLORIDE SERPL-SCNC: 91 MMOL/L (ref 98–107)
CHLORIDE SERPL-SCNC: 93 MMOL/L (ref 98–107)
COLOR FLD: YELLOW
COLOR UR AUTO: YELLOW
CREAT SERPL-MCNC: 4.19 MG/DL (ref 0.67–1.17)
CREAT SERPL-MCNC: 4.4 MG/DL (ref 0.67–1.17)
EGFRCR SERPLBLD CKD-EPI 2021: 13 ML/MIN/1.73M2
EGFRCR SERPLBLD CKD-EPI 2021: 14 ML/MIN/1.73M2
ERYTHROCYTE [DISTWIDTH] IN BLOOD BY AUTOMATED COUNT: 15.9 % (ref 10–15)
GLUCOSE SERPL-MCNC: 192 MG/DL (ref 70–99)
GLUCOSE SERPL-MCNC: 213 MG/DL (ref 70–99)
GLUCOSE UR STRIP-MCNC: NEGATIVE MG/DL
HCO3 SERPL-SCNC: 20 MMOL/L (ref 22–29)
HCO3 SERPL-SCNC: 20 MMOL/L (ref 22–29)
HCT VFR BLD AUTO: 25.7 % (ref 40–53)
HGB BLD-MCNC: 7.2 G/DL (ref 13.3–17.7)
HGB BLD-MCNC: 8.3 G/DL (ref 13.3–17.7)
HGB BLD-MCNC: 8.7 G/DL (ref 13.3–17.7)
HGB UR QL STRIP: NEGATIVE
HOLD SPECIMEN: NORMAL
HYALINE CASTS: 6 /LPF
KETONES UR STRIP-MCNC: NEGATIVE MG/DL
LACTATE SERPL-SCNC: 3.6 MMOL/L (ref 0.7–2)
LEUKOCYTE ESTERASE UR QL STRIP: NEGATIVE
LIPASE SERPL-CCNC: 116 U/L (ref 13–60)
LVEF ECHO: NORMAL
LYMPHOCYTES NFR FLD MANUAL: 0 %
MAGNESIUM SERPL-MCNC: 2 MG/DL (ref 1.7–2.3)
MCH RBC QN AUTO: 33.6 PG (ref 26.5–33)
MCHC RBC AUTO-ENTMCNC: 33.9 G/DL (ref 31.5–36.5)
MCV RBC AUTO: 98 FL (ref 78–100)
MCV RBC AUTO: 99 FL (ref 78–100)
MCV RBC AUTO: 99 FL (ref 78–100)
MONOS+MACROS NFR FLD MANUAL: 95 %
MUCOUS THREADS #/AREA URNS LPF: PRESENT /LPF
NEUTROPHILS # FLD: 40.1 /UL (ref ?–250)
NEUTS BAND NFR FLD MANUAL: 5 %
NITRATE UR QL: NEGATIVE
NT-PROBNP SERPL-MCNC: 604 PG/ML (ref 0–229)
PH UR STRIP: 5 [PH] (ref 5–7)
PHOSPHATE SERPL-MCNC: 4.1 MG/DL (ref 2.5–4.5)
PLATELET # BLD AUTO: 131 10E3/UL (ref 150–450)
POTASSIUM SERPL-SCNC: 4.8 MMOL/L (ref 3.4–5.3)
POTASSIUM SERPL-SCNC: 5.6 MMOL/L (ref 3.4–5.3)
PROT FLD-MCNC: 1.3 G/DL
PROT SERPL-MCNC: 5.6 G/DL (ref 6.4–8.3)
PROT SERPL-MCNC: 5.9 G/DL (ref 6.4–8.3)
PROTEIN BODY FLUID SOURCE: NORMAL
RBC # BLD AUTO: 2.59 10E6/UL (ref 4.4–5.9)
RBC URINE: <1 /HPF
SODIUM SERPL-SCNC: 130 MMOL/L (ref 135–145)
SODIUM SERPL-SCNC: 130 MMOL/L (ref 135–145)
SP GR UR STRIP: 1.01 (ref 1–1.03)
SPECIMEN SOURCE FLD: ABNORMAL
TROPONIN T SERPL HS-MCNC: 63 NG/L
TROPONIN T SERPL HS-MCNC: 63 NG/L
UROBILINOGEN UR STRIP-MCNC: NORMAL MG/DL
WBC # BLD AUTO: 16.1 10E3/UL (ref 4–11)
WBC # FLD AUTO: 801 /UL
WBC URINE: <1 /HPF

## 2025-08-11 PROCEDURE — 99223 1ST HOSP IP/OBS HIGH 75: CPT | Performed by: STUDENT IN AN ORGANIZED HEALTH CARE EDUCATION/TRAINING PROGRAM

## 2025-08-11 PROCEDURE — 81001 URINALYSIS AUTO W/SCOPE: CPT | Performed by: STUDENT IN AN ORGANIZED HEALTH CARE EDUCATION/TRAINING PROGRAM

## 2025-08-11 PROCEDURE — 97535 SELF CARE MNGMENT TRAINING: CPT | Mod: GO

## 2025-08-11 PROCEDURE — 255N000002 HC RX 255 OP 636: Performed by: INTERNAL MEDICINE

## 2025-08-11 PROCEDURE — 84157 ASSAY OF PROTEIN OTHER: CPT | Performed by: STUDENT IN AN ORGANIZED HEALTH CARE EDUCATION/TRAINING PROGRAM

## 2025-08-11 PROCEDURE — 82610 CYSTATIN C: CPT | Performed by: INTERNAL MEDICINE

## 2025-08-11 PROCEDURE — 97110 THERAPEUTIC EXERCISES: CPT | Mod: GP

## 2025-08-11 PROCEDURE — 85018 HEMOGLOBIN: CPT | Performed by: INTERNAL MEDICINE

## 2025-08-11 PROCEDURE — 99418 PROLNG IP/OBS E/M EA 15 MIN: CPT | Performed by: STUDENT IN AN ORGANIZED HEALTH CARE EDUCATION/TRAINING PROGRAM

## 2025-08-11 PROCEDURE — 96368 THER/DIAG CONCURRENT INF: CPT

## 2025-08-11 PROCEDURE — 85027 COMPLETE CBC AUTOMATED: CPT | Performed by: STUDENT IN AN ORGANIZED HEALTH CARE EDUCATION/TRAINING PROGRAM

## 2025-08-11 PROCEDURE — 250N000012 HC RX MED GY IP 250 OP 636 PS 637: Mod: JA | Performed by: STUDENT IN AN ORGANIZED HEALTH CARE EDUCATION/TRAINING PROGRAM

## 2025-08-11 PROCEDURE — 96365 THER/PROPH/DIAG IV INF INIT: CPT

## 2025-08-11 PROCEDURE — 84100 ASSAY OF PHOSPHORUS: CPT | Performed by: STUDENT IN AN ORGANIZED HEALTH CARE EDUCATION/TRAINING PROGRAM

## 2025-08-11 PROCEDURE — 89050 BODY FLUID CELL COUNT: CPT | Performed by: STUDENT IN AN ORGANIZED HEALTH CARE EDUCATION/TRAINING PROGRAM

## 2025-08-11 PROCEDURE — P9047 ALBUMIN (HUMAN), 25%, 50ML: HCPCS | Performed by: INTERNAL MEDICINE

## 2025-08-11 PROCEDURE — 83605 ASSAY OF LACTIC ACID: CPT | Performed by: STUDENT IN AN ORGANIZED HEALTH CARE EDUCATION/TRAINING PROGRAM

## 2025-08-11 PROCEDURE — 258N000003 HC RX IP 258 OP 636: Performed by: STUDENT IN AN ORGANIZED HEALTH CARE EDUCATION/TRAINING PROGRAM

## 2025-08-11 PROCEDURE — 93308 TTE F-UP OR LMTD: CPT | Mod: 26 | Performed by: INTERNAL MEDICINE

## 2025-08-11 PROCEDURE — 250N000011 HC RX IP 250 OP 636: Performed by: STUDENT IN AN ORGANIZED HEALTH CARE EDUCATION/TRAINING PROGRAM

## 2025-08-11 PROCEDURE — 93325 DOPPLER ECHO COLOR FLOW MAPG: CPT

## 2025-08-11 PROCEDURE — 120N000001 HC R&B MED SURG/OB

## 2025-08-11 PROCEDURE — 99222 1ST HOSP IP/OBS MODERATE 55: CPT | Performed by: INTERNAL MEDICINE

## 2025-08-11 PROCEDURE — 250N000013 HC RX MED GY IP 250 OP 250 PS 637: Performed by: STUDENT IN AN ORGANIZED HEALTH CARE EDUCATION/TRAINING PROGRAM

## 2025-08-11 PROCEDURE — 71250 CT THORAX DX C-: CPT

## 2025-08-11 PROCEDURE — 0W9G3ZX DRAINAGE OF PERITONEAL CAVITY, PERCUTANEOUS APPROACH, DIAGNOSTIC: ICD-10-PCS | Performed by: RADIOLOGY

## 2025-08-11 PROCEDURE — 84484 ASSAY OF TROPONIN QUANT: CPT | Performed by: STUDENT IN AN ORGANIZED HEALTH CARE EDUCATION/TRAINING PROGRAM

## 2025-08-11 PROCEDURE — 87040 BLOOD CULTURE FOR BACTERIA: CPT | Performed by: STUDENT IN AN ORGANIZED HEALTH CARE EDUCATION/TRAINING PROGRAM

## 2025-08-11 PROCEDURE — 272N000710 US PARACENTESIS WITHOUT ALBUMIN

## 2025-08-11 PROCEDURE — 85018 HEMOGLOBIN: CPT | Performed by: STUDENT IN AN ORGANIZED HEALTH CARE EDUCATION/TRAINING PROGRAM

## 2025-08-11 PROCEDURE — 83735 ASSAY OF MAGNESIUM: CPT | Performed by: STUDENT IN AN ORGANIZED HEALTH CARE EDUCATION/TRAINING PROGRAM

## 2025-08-11 PROCEDURE — 82042 OTHER SOURCE ALBUMIN QUAN EA: CPT | Performed by: STUDENT IN AN ORGANIZED HEALTH CARE EDUCATION/TRAINING PROGRAM

## 2025-08-11 PROCEDURE — 96361 HYDRATE IV INFUSION ADD-ON: CPT

## 2025-08-11 PROCEDURE — 83880 ASSAY OF NATRIURETIC PEPTIDE: CPT | Performed by: STUDENT IN AN ORGANIZED HEALTH CARE EDUCATION/TRAINING PROGRAM

## 2025-08-11 PROCEDURE — 97162 PT EVAL MOD COMPLEX 30 MIN: CPT | Mod: GP

## 2025-08-11 PROCEDURE — 250N000011 HC RX IP 250 OP 636: Performed by: INTERNAL MEDICINE

## 2025-08-11 PROCEDURE — 36415 COLL VENOUS BLD VENIPUNCTURE: CPT | Performed by: STUDENT IN AN ORGANIZED HEALTH CARE EDUCATION/TRAINING PROGRAM

## 2025-08-11 PROCEDURE — 93325 DOPPLER ECHO COLOR FLOW MAPG: CPT | Mod: 26 | Performed by: INTERNAL MEDICINE

## 2025-08-11 PROCEDURE — 36415 COLL VENOUS BLD VENIPUNCTURE: CPT | Performed by: INTERNAL MEDICINE

## 2025-08-11 PROCEDURE — 97166 OT EVAL MOD COMPLEX 45 MIN: CPT | Mod: GO

## 2025-08-11 PROCEDURE — 87205 SMEAR GRAM STAIN: CPT | Performed by: STUDENT IN AN ORGANIZED HEALTH CARE EDUCATION/TRAINING PROGRAM

## 2025-08-11 PROCEDURE — 93321 DOPPLER ECHO F-UP/LMTD STD: CPT | Mod: 26 | Performed by: INTERNAL MEDICINE

## 2025-08-11 PROCEDURE — 87070 CULTURE OTHR SPECIMN AEROBIC: CPT | Performed by: STUDENT IN AN ORGANIZED HEALTH CARE EDUCATION/TRAINING PROGRAM

## 2025-08-11 PROCEDURE — 84155 ASSAY OF PROTEIN SERUM: CPT | Performed by: STUDENT IN AN ORGANIZED HEALTH CARE EDUCATION/TRAINING PROGRAM

## 2025-08-11 RX ORDER — ONDANSETRON 2 MG/ML
4 INJECTION INTRAMUSCULAR; INTRAVENOUS EVERY 6 HOURS PRN
Status: DISCONTINUED | OUTPATIENT
Start: 2025-08-11 | End: 2025-08-19 | Stop reason: HOSPADM

## 2025-08-11 RX ORDER — POTASSIUM CHLORIDE 1500 MG/1
1 TABLET, EXTENDED RELEASE ORAL DAILY
Status: ON HOLD | COMMUNITY
Start: 2025-08-09 | End: 2025-08-19

## 2025-08-11 RX ORDER — CEFEPIME HYDROCHLORIDE 2 G/1
2 INJECTION, POWDER, FOR SOLUTION INTRAVENOUS EVERY 24 HOURS
Status: DISCONTINUED | OUTPATIENT
Start: 2025-08-12 | End: 2025-08-12

## 2025-08-11 RX ORDER — MULTIPLE VITAMINS W/ MINERALS TAB 9MG-400MCG
1 TAB ORAL DAILY
Status: DISCONTINUED | OUTPATIENT
Start: 2025-08-11 | End: 2025-08-19 | Stop reason: HOSPADM

## 2025-08-11 RX ORDER — CEFEPIME HYDROCHLORIDE 2 G/1
2 INJECTION, POWDER, FOR SOLUTION INTRAVENOUS ONCE
Status: COMPLETED | OUTPATIENT
Start: 2025-08-11 | End: 2025-08-11

## 2025-08-11 RX ORDER — METRONIDAZOLE 500 MG/100ML
500 INJECTION, SOLUTION INTRAVENOUS EVERY 12 HOURS
Status: DISCONTINUED | OUTPATIENT
Start: 2025-08-11 | End: 2025-08-12

## 2025-08-11 RX ORDER — ACETAMINOPHEN 325 MG/1
650 TABLET ORAL EVERY 4 HOURS PRN
Status: DISCONTINUED | OUTPATIENT
Start: 2025-08-11 | End: 2025-08-19 | Stop reason: HOSPADM

## 2025-08-11 RX ORDER — LACTULOSE 10 G/15ML
10 SOLUTION ORAL DAILY
Status: DISCONTINUED | OUTPATIENT
Start: 2025-08-11 | End: 2025-08-19 | Stop reason: HOSPADM

## 2025-08-11 RX ORDER — CEFAZOLIN SODIUM 1 G/50ML
1250 SOLUTION INTRAVENOUS
Status: DISCONTINUED | OUTPATIENT
Start: 2025-08-13 | End: 2025-08-12

## 2025-08-11 RX ORDER — GABAPENTIN 300 MG/1
300 CAPSULE ORAL
Status: DISCONTINUED | OUTPATIENT
Start: 2025-08-11 | End: 2025-08-19 | Stop reason: HOSPADM

## 2025-08-11 RX ORDER — CALCIUM CARBONATE 500 MG/1
1000 TABLET, CHEWABLE ORAL 4 TIMES DAILY PRN
Status: DISCONTINUED | OUTPATIENT
Start: 2025-08-11 | End: 2025-08-19 | Stop reason: HOSPADM

## 2025-08-11 RX ORDER — SPIRONOLACTONE 25 MG/1
25 TABLET ORAL DAILY
Status: DISCONTINUED | OUTPATIENT
Start: 2025-08-11 | End: 2025-08-19 | Stop reason: HOSPADM

## 2025-08-11 RX ORDER — CEFAZOLIN SODIUM 1 G/50ML
2000 SOLUTION INTRAVENOUS ONCE
Status: COMPLETED | OUTPATIENT
Start: 2025-08-11 | End: 2025-08-11

## 2025-08-11 RX ORDER — ALBUMIN (HUMAN) 12.5 G/50ML
100 SOLUTION INTRAVENOUS DAILY
Status: COMPLETED | OUTPATIENT
Start: 2025-08-11 | End: 2025-08-13

## 2025-08-11 RX ORDER — ACETAMINOPHEN 650 MG/1
650 SUPPOSITORY RECTAL EVERY 4 HOURS PRN
Status: DISCONTINUED | OUTPATIENT
Start: 2025-08-11 | End: 2025-08-19 | Stop reason: HOSPADM

## 2025-08-11 RX ORDER — AMOXICILLIN 250 MG
2 CAPSULE ORAL 2 TIMES DAILY PRN
Status: DISCONTINUED | OUTPATIENT
Start: 2025-08-11 | End: 2025-08-19 | Stop reason: HOSPADM

## 2025-08-11 RX ORDER — TORSEMIDE 20 MG/1
20 TABLET ORAL DAILY
Status: DISCONTINUED | OUTPATIENT
Start: 2025-08-11 | End: 2025-08-19 | Stop reason: HOSPADM

## 2025-08-11 RX ORDER — AMOXICILLIN 250 MG
1 CAPSULE ORAL 2 TIMES DAILY PRN
Status: DISCONTINUED | OUTPATIENT
Start: 2025-08-11 | End: 2025-08-19 | Stop reason: HOSPADM

## 2025-08-11 RX ORDER — POLYETHYLENE GLYCOL 3350 17 G/17G
17 POWDER, FOR SOLUTION ORAL 2 TIMES DAILY PRN
Status: DISCONTINUED | OUTPATIENT
Start: 2025-08-11 | End: 2025-08-19 | Stop reason: HOSPADM

## 2025-08-11 RX ORDER — ONDANSETRON 4 MG/1
4 TABLET, ORALLY DISINTEGRATING ORAL EVERY 6 HOURS PRN
Status: DISCONTINUED | OUTPATIENT
Start: 2025-08-11 | End: 2025-08-19 | Stop reason: HOSPADM

## 2025-08-11 RX ORDER — MIDODRINE HYDROCHLORIDE 5 MG/1
10 TABLET ORAL 4 TIMES DAILY
Status: DISCONTINUED | OUTPATIENT
Start: 2025-08-11 | End: 2025-08-18

## 2025-08-11 RX ORDER — OCTREOTIDE ACETATE 200 UG/ML
50 INJECTION INTRAVENOUS ONCE
Status: COMPLETED | OUTPATIENT
Start: 2025-08-11 | End: 2025-08-11

## 2025-08-11 RX ORDER — LIDOCAINE 40 MG/G
CREAM TOPICAL
Status: DISCONTINUED | OUTPATIENT
Start: 2025-08-11 | End: 2025-08-19 | Stop reason: HOSPADM

## 2025-08-11 RX ORDER — GABAPENTIN 100 MG/1
300 CAPSULE ORAL ONCE
Status: COMPLETED | OUTPATIENT
Start: 2025-08-11 | End: 2025-08-11

## 2025-08-11 RX ORDER — CEFEPIME HYDROCHLORIDE 2 G/1
2 INJECTION, POWDER, FOR SOLUTION INTRAVENOUS EVERY 12 HOURS
Status: DISCONTINUED | OUTPATIENT
Start: 2025-08-11 | End: 2025-08-11 | Stop reason: DRUGHIGH

## 2025-08-11 RX ADMIN — MIDODRINE HYDROCHLORIDE 10 MG: 5 TABLET ORAL at 21:36

## 2025-08-11 RX ADMIN — PANTOPRAZOLE SODIUM 40 MG: 40 INJECTION, POWDER, FOR SOLUTION INTRAVENOUS at 12:28

## 2025-08-11 RX ADMIN — MIDODRINE HYDROCHLORIDE 10 MG: 5 TABLET ORAL at 08:03

## 2025-08-11 RX ADMIN — ALBUMIN HUMAN 100 G: 0.25 SOLUTION INTRAVENOUS at 17:31

## 2025-08-11 RX ADMIN — LACTULOSE SOLUTION USP, 10 G/15 ML 10 G: 10 SOLUTION ORAL; RECTAL at 10:25

## 2025-08-11 RX ADMIN — THIAMINE HCL TAB 100 MG 100 MG: 100 TAB at 08:03

## 2025-08-11 RX ADMIN — RIFAXIMIN 550 MG: 550 TABLET ORAL at 21:36

## 2025-08-11 RX ADMIN — PANTOPRAZOLE SODIUM 40 MG: 40 INJECTION, POWDER, FOR SOLUTION INTRAVENOUS at 08:03

## 2025-08-11 RX ADMIN — SODIUM CHLORIDE 2000 MG: 0.9 INJECTION, SOLUTION INTRAVENOUS at 01:02

## 2025-08-11 RX ADMIN — RIFAXIMIN 550 MG: 550 TABLET ORAL at 13:12

## 2025-08-11 RX ADMIN — OCTREOTIDE ACETATE 50 MCG: 200 INJECTION INTRAVENOUS at 05:53

## 2025-08-11 RX ADMIN — GABAPENTIN 300 MG: 100 CAPSULE ORAL at 02:28

## 2025-08-11 RX ADMIN — SODIUM CHLORIDE 500 ML: 9 INJECTION, SOLUTION INTRAVENOUS at 04:34

## 2025-08-11 RX ADMIN — OCTREOTIDE ACETATE 50 MCG/HR: 500 INJECTION, SOLUTION INTRAVENOUS; SUBCUTANEOUS at 05:53

## 2025-08-11 RX ADMIN — PERFLUTREN 3 ML (DILUTED): 6.52 INJECTION, SUSPENSION INTRAVENOUS at 15:13

## 2025-08-11 RX ADMIN — SODIUM CHLORIDE 500 ML: 0.9 INJECTION, SOLUTION INTRAVENOUS at 05:59

## 2025-08-11 RX ADMIN — METRONIDAZOLE 500 MG: 500 INJECTION, SOLUTION INTRAVENOUS at 04:34

## 2025-08-11 RX ADMIN — SODIUM CHLORIDE 500 ML: 9 INJECTION, SOLUTION INTRAVENOUS at 00:51

## 2025-08-11 RX ADMIN — METRONIDAZOLE 500 MG: 500 INJECTION, SOLUTION INTRAVENOUS at 17:24

## 2025-08-11 RX ADMIN — RIFAXIMIN 550 MG: 550 TABLET ORAL at 08:02

## 2025-08-11 RX ADMIN — MIDODRINE HYDROCHLORIDE 10 MG: 5 TABLET ORAL at 17:30

## 2025-08-11 RX ADMIN — Medication 1 TABLET: at 08:02

## 2025-08-11 RX ADMIN — MIDODRINE HYDROCHLORIDE 10 MG: 5 TABLET ORAL at 12:28

## 2025-08-11 RX ADMIN — CEFEPIME HYDROCHLORIDE 2 G: 2 INJECTION, POWDER, FOR SOLUTION INTRAVENOUS at 00:51

## 2025-08-11 ASSESSMENT — ACTIVITIES OF DAILY LIVING (ADL)
ADLS_ACUITY_SCORE: 44
ADLS_ACUITY_SCORE: 44
ADLS_ACUITY_SCORE: 45
ADLS_ACUITY_SCORE: 62
ADLS_ACUITY_SCORE: 57
ADLS_ACUITY_SCORE: 57
ADLS_ACUITY_SCORE: 44
ADLS_ACUITY_SCORE: 44
DEPENDENT_IADLS:: INDEPENDENT
ADLS_ACUITY_SCORE: 44
ADLS_ACUITY_SCORE: 57
ADLS_ACUITY_SCORE: 44
ADLS_ACUITY_SCORE: 44
ADLS_ACUITY_SCORE: 57
ADLS_ACUITY_SCORE: 44
ADLS_ACUITY_SCORE: 44
ADLS_ACUITY_SCORE: 45
ADLS_ACUITY_SCORE: 44
ADLS_ACUITY_SCORE: 44
ADLS_ACUITY_SCORE: 45
ADLS_ACUITY_SCORE: 57
ADLS_ACUITY_SCORE: 44

## 2025-08-12 ENCOUNTER — APPOINTMENT (OUTPATIENT)
Dept: OCCUPATIONAL THERAPY | Facility: HOSPITAL | Age: 75
DRG: 441 | End: 2025-08-12
Attending: INTERNAL MEDICINE
Payer: COMMERCIAL

## 2025-08-12 ENCOUNTER — APPOINTMENT (OUTPATIENT)
Dept: PHYSICAL THERAPY | Facility: HOSPITAL | Age: 75
DRG: 441 | End: 2025-08-12
Attending: INTERNAL MEDICINE
Payer: COMMERCIAL

## 2025-08-12 LAB
ALBUMIN SERPL BCG-MCNC: 4 G/DL (ref 3.5–5.2)
ALP SERPL-CCNC: 102 U/L (ref 40–150)
ALT SERPL W P-5'-P-CCNC: 59 U/L (ref 0–70)
ANION GAP SERPL CALCULATED.3IONS-SCNC: 19 MMOL/L (ref 7–15)
AST SERPL W P-5'-P-CCNC: 93 U/L (ref 0–45)
BILIRUB DIRECT SERPL-MCNC: 9.38 MG/DL (ref 0–0.3)
BILIRUB SERPL-MCNC: 16.8 MG/DL
BUN SERPL-MCNC: 122.1 MG/DL (ref 8–23)
CALCIUM SERPL-MCNC: 10 MG/DL (ref 8.8–10.4)
CHLORIDE SERPL-SCNC: 95 MMOL/L (ref 98–107)
CREAT SERPL-MCNC: 4.26 MG/DL (ref 0.67–1.17)
CREAT SERPL-MCNC: 4.26 MG/DL (ref 0.67–1.17)
CYSTATIN C (ROCHE): 3.8 MG/L (ref 0.6–1)
EGFRCR SERPLBLD CKD-EPI 2021: 14 ML/MIN/1.73M2
EGFRCR SERPLBLD CKD-EPI 2021: 14 ML/MIN/1.73M2
ERYTHROCYTE [DISTWIDTH] IN BLOOD BY AUTOMATED COUNT: 15.9 % (ref 10–15)
GFR/BSA.PRED SERPLBLD CYS-BASED-ARV: 13 ML/MIN/1.73M2
GLUCOSE SERPL-MCNC: 174 MG/DL (ref 70–99)
HCO3 SERPL-SCNC: 19 MMOL/L (ref 22–29)
HCT VFR BLD AUTO: 22.4 % (ref 40–53)
HGB BLD-MCNC: 7.6 G/DL (ref 13.3–17.7)
LACTATE SERPL-SCNC: 2.5 MMOL/L (ref 0.7–2)
MCH RBC QN AUTO: 33.6 PG (ref 26.5–33)
MCHC RBC AUTO-ENTMCNC: 33.9 G/DL (ref 31.5–36.5)
MCV RBC AUTO: 99 FL (ref 78–100)
PLATELET # BLD AUTO: 119 10E3/UL (ref 150–450)
POTASSIUM SERPL-SCNC: 3.7 MMOL/L (ref 3.4–5.3)
PROT SERPL-MCNC: 6 G/DL (ref 6.4–8.3)
RBC # BLD AUTO: 2.26 10E6/UL (ref 4.4–5.9)
SODIUM SERPL-SCNC: 133 MMOL/L (ref 135–145)
WBC # BLD AUTO: 12.5 10E3/UL (ref 4–11)

## 2025-08-12 PROCEDURE — 250N000011 HC RX IP 250 OP 636: Performed by: INTERNAL MEDICINE

## 2025-08-12 PROCEDURE — 36415 COLL VENOUS BLD VENIPUNCTURE: CPT | Performed by: INTERNAL MEDICINE

## 2025-08-12 PROCEDURE — 250N000012 HC RX MED GY IP 250 OP 636 PS 637: Mod: JA | Performed by: STUDENT IN AN ORGANIZED HEALTH CARE EDUCATION/TRAINING PROGRAM

## 2025-08-12 PROCEDURE — 83605 ASSAY OF LACTIC ACID: CPT | Performed by: INTERNAL MEDICINE

## 2025-08-12 PROCEDURE — 97535 SELF CARE MNGMENT TRAINING: CPT | Mod: GO

## 2025-08-12 PROCEDURE — 82565 ASSAY OF CREATININE: CPT | Performed by: INTERNAL MEDICINE

## 2025-08-12 PROCEDURE — 97530 THERAPEUTIC ACTIVITIES: CPT | Mod: GP

## 2025-08-12 PROCEDURE — 99233 SBSQ HOSP IP/OBS HIGH 50: CPT | Performed by: INTERNAL MEDICINE

## 2025-08-12 PROCEDURE — P9047 ALBUMIN (HUMAN), 25%, 50ML: HCPCS | Performed by: INTERNAL MEDICINE

## 2025-08-12 PROCEDURE — 82248 BILIRUBIN DIRECT: CPT | Performed by: INTERNAL MEDICINE

## 2025-08-12 PROCEDURE — 80048 BASIC METABOLIC PNL TOTAL CA: CPT | Performed by: INTERNAL MEDICINE

## 2025-08-12 PROCEDURE — 36415 COLL VENOUS BLD VENIPUNCTURE: CPT | Performed by: STUDENT IN AN ORGANIZED HEALTH CARE EDUCATION/TRAINING PROGRAM

## 2025-08-12 PROCEDURE — 258N000003 HC RX IP 258 OP 636: Performed by: STUDENT IN AN ORGANIZED HEALTH CARE EDUCATION/TRAINING PROGRAM

## 2025-08-12 PROCEDURE — 120N000001 HC R&B MED SURG/OB

## 2025-08-12 PROCEDURE — 85014 HEMATOCRIT: CPT | Performed by: STUDENT IN AN ORGANIZED HEALTH CARE EDUCATION/TRAINING PROGRAM

## 2025-08-12 PROCEDURE — 99232 SBSQ HOSP IP/OBS MODERATE 35: CPT | Performed by: INTERNAL MEDICINE

## 2025-08-12 PROCEDURE — 250N000013 HC RX MED GY IP 250 OP 250 PS 637: Performed by: STUDENT IN AN ORGANIZED HEALTH CARE EDUCATION/TRAINING PROGRAM

## 2025-08-12 PROCEDURE — 250N000011 HC RX IP 250 OP 636: Performed by: STUDENT IN AN ORGANIZED HEALTH CARE EDUCATION/TRAINING PROGRAM

## 2025-08-12 RX ORDER — CEFTRIAXONE 1 G/1
1 INJECTION, POWDER, FOR SOLUTION INTRAMUSCULAR; INTRAVENOUS EVERY 24 HOURS
Status: DISCONTINUED | OUTPATIENT
Start: 2025-08-12 | End: 2025-08-14

## 2025-08-12 RX ADMIN — MIDODRINE HYDROCHLORIDE 10 MG: 5 TABLET ORAL at 16:20

## 2025-08-12 RX ADMIN — RIFAXIMIN 550 MG: 550 TABLET ORAL at 19:36

## 2025-08-12 RX ADMIN — PANTOPRAZOLE SODIUM 40 MG: 40 INJECTION, POWDER, FOR SOLUTION INTRAVENOUS at 09:09

## 2025-08-12 RX ADMIN — MIDODRINE HYDROCHLORIDE 10 MG: 5 TABLET ORAL at 19:37

## 2025-08-12 RX ADMIN — ALBUMIN HUMAN 100 G: 0.25 SOLUTION INTRAVENOUS at 09:20

## 2025-08-12 RX ADMIN — PANTOPRAZOLE SODIUM 40 MG: 40 INJECTION, POWDER, FOR SOLUTION INTRAVENOUS at 11:36

## 2025-08-12 RX ADMIN — MIDODRINE HYDROCHLORIDE 10 MG: 5 TABLET ORAL at 09:09

## 2025-08-12 RX ADMIN — Medication 1 TABLET: at 09:09

## 2025-08-12 RX ADMIN — RIFAXIMIN 550 MG: 550 TABLET ORAL at 13:54

## 2025-08-12 RX ADMIN — LACTULOSE SOLUTION USP, 10 G/15 ML 10 G: 10 SOLUTION ORAL; RECTAL at 09:09

## 2025-08-12 RX ADMIN — THIAMINE HCL TAB 100 MG 100 MG: 100 TAB at 09:09

## 2025-08-12 RX ADMIN — MIDODRINE HYDROCHLORIDE 10 MG: 5 TABLET ORAL at 11:10

## 2025-08-12 RX ADMIN — CEFTRIAXONE SODIUM 1 G: 1 INJECTION, POWDER, FOR SOLUTION INTRAMUSCULAR; INTRAVENOUS at 10:34

## 2025-08-12 RX ADMIN — CEFEPIME HYDROCHLORIDE 2 G: 2 INJECTION, POWDER, FOR SOLUTION INTRAVENOUS at 00:44

## 2025-08-12 RX ADMIN — RIFAXIMIN 550 MG: 550 TABLET ORAL at 09:15

## 2025-08-12 RX ADMIN — METRONIDAZOLE 500 MG: 500 INJECTION, SOLUTION INTRAVENOUS at 05:36

## 2025-08-12 RX ADMIN — OCTREOTIDE ACETATE 50 MCG/HR: 500 INJECTION, SOLUTION INTRAVENOUS; SUBCUTANEOUS at 05:28

## 2025-08-12 ASSESSMENT — ACTIVITIES OF DAILY LIVING (ADL)
ADLS_ACUITY_SCORE: 45
ADLS_ACUITY_SCORE: 46
ADLS_ACUITY_SCORE: 45
ADLS_ACUITY_SCORE: 46
ADLS_ACUITY_SCORE: 45
ADLS_ACUITY_SCORE: 46
ADLS_ACUITY_SCORE: 45
ADLS_ACUITY_SCORE: 46
ADLS_ACUITY_SCORE: 45

## 2025-08-13 ENCOUNTER — APPOINTMENT (OUTPATIENT)
Dept: RADIOLOGY | Facility: HOSPITAL | Age: 75
DRG: 441 | End: 2025-08-13
Attending: FAMILY MEDICINE
Payer: COMMERCIAL

## 2025-08-13 LAB
ABO + RH BLD: NORMAL
ALT SERPL W P-5'-P-CCNC: 49 U/L (ref 0–70)
ANION GAP SERPL CALCULATED.3IONS-SCNC: 17 MMOL/L (ref 7–15)
BLD GP AB SCN SERPL QL: NEGATIVE
BLD PROD TYP BPU: NORMAL
BLOOD COMPONENT TYPE: NORMAL
BUN SERPL-MCNC: 111.9 MG/DL (ref 8–23)
CALCIUM SERPL-MCNC: 9.8 MG/DL (ref 8.8–10.4)
CHLORIDE SERPL-SCNC: 96 MMOL/L (ref 98–107)
CODING SYSTEM: NORMAL
CREAT SERPL-MCNC: 4.61 MG/DL (ref 0.67–1.17)
CROSSMATCH: NORMAL
CRP SERPL-MCNC: 17.1 MG/L
EGFRCR SERPLBLD CKD-EPI 2021: 13 ML/MIN/1.73M2
ERYTHROCYTE [DISTWIDTH] IN BLOOD BY AUTOMATED COUNT: 16.4 % (ref 10–15)
GLUCOSE SERPL-MCNC: 183 MG/DL (ref 70–99)
HCO3 SERPL-SCNC: 19 MMOL/L (ref 22–29)
HCT VFR BLD AUTO: 21.1 % (ref 40–53)
HGB BLD-MCNC: 7.1 G/DL (ref 13.3–17.7)
ISSUE DATE AND TIME: NORMAL
LACTATE SERPL-SCNC: 3 MMOL/L (ref 0.7–2)
MCH RBC QN AUTO: 33.8 PG (ref 26.5–33)
MCHC RBC AUTO-ENTMCNC: 33.6 G/DL (ref 31.5–36.5)
MCV RBC AUTO: 100.5 FL (ref 78–100)
PLATELET # BLD AUTO: 128 10E3/UL (ref 150–450)
POTASSIUM SERPL-SCNC: 4 MMOL/L (ref 3.4–5.3)
PROCALCITONIN SERPL IA-MCNC: 1.99 NG/ML
RBC # BLD AUTO: 2.1 10E6/UL (ref 4.4–5.9)
SODIUM SERPL-SCNC: 132 MMOL/L (ref 135–145)
SPECIMEN EXP DATE BLD: NORMAL
UNIT ABO/RH: NORMAL
UNIT NUMBER: NORMAL
UNIT STATUS: NORMAL
UNIT TYPE ISBT: 6200
WBC # BLD AUTO: 11.05 10E3/UL (ref 4–11)

## 2025-08-13 PROCEDURE — 86901 BLOOD TYPING SEROLOGIC RH(D): CPT | Performed by: FAMILY MEDICINE

## 2025-08-13 PROCEDURE — 87040 BLOOD CULTURE FOR BACTERIA: CPT | Performed by: FAMILY MEDICINE

## 2025-08-13 PROCEDURE — 84460 ALANINE AMINO (ALT) (SGPT): CPT | Performed by: FAMILY MEDICINE

## 2025-08-13 PROCEDURE — 85027 COMPLETE CBC AUTOMATED: CPT

## 2025-08-13 PROCEDURE — 36415 COLL VENOUS BLD VENIPUNCTURE: CPT | Performed by: INTERNAL MEDICINE

## 2025-08-13 PROCEDURE — 36415 COLL VENOUS BLD VENIPUNCTURE: CPT | Performed by: FAMILY MEDICINE

## 2025-08-13 PROCEDURE — 83605 ASSAY OF LACTIC ACID: CPT | Performed by: FAMILY MEDICINE

## 2025-08-13 PROCEDURE — 258N000003 HC RX IP 258 OP 636: Performed by: STUDENT IN AN ORGANIZED HEALTH CARE EDUCATION/TRAINING PROGRAM

## 2025-08-13 PROCEDURE — 86923 COMPATIBILITY TEST ELECTRIC: CPT | Performed by: HOSPITALIST

## 2025-08-13 PROCEDURE — 250N000011 HC RX IP 250 OP 636: Performed by: INTERNAL MEDICINE

## 2025-08-13 PROCEDURE — 84145 PROCALCITONIN (PCT): CPT | Performed by: FAMILY MEDICINE

## 2025-08-13 PROCEDURE — 250N000012 HC RX MED GY IP 250 OP 636 PS 637: Mod: JA | Performed by: STUDENT IN AN ORGANIZED HEALTH CARE EDUCATION/TRAINING PROGRAM

## 2025-08-13 PROCEDURE — 250N000011 HC RX IP 250 OP 636: Performed by: FAMILY MEDICINE

## 2025-08-13 PROCEDURE — 250N000013 HC RX MED GY IP 250 OP 250 PS 637: Performed by: STUDENT IN AN ORGANIZED HEALTH CARE EDUCATION/TRAINING PROGRAM

## 2025-08-13 PROCEDURE — 250N000011 HC RX IP 250 OP 636: Performed by: STUDENT IN AN ORGANIZED HEALTH CARE EDUCATION/TRAINING PROGRAM

## 2025-08-13 PROCEDURE — 80048 BASIC METABOLIC PNL TOTAL CA: CPT | Performed by: INTERNAL MEDICINE

## 2025-08-13 PROCEDURE — 36415 COLL VENOUS BLD VENIPUNCTURE: CPT

## 2025-08-13 PROCEDURE — P9016 RBC LEUKOCYTES REDUCED: HCPCS | Performed by: FAMILY MEDICINE

## 2025-08-13 PROCEDURE — 120N000001 HC R&B MED SURG/OB

## 2025-08-13 PROCEDURE — 99232 SBSQ HOSP IP/OBS MODERATE 35: CPT | Performed by: INTERNAL MEDICINE

## 2025-08-13 PROCEDURE — 86923 COMPATIBILITY TEST ELECTRIC: CPT | Performed by: FAMILY MEDICINE

## 2025-08-13 PROCEDURE — P9047 ALBUMIN (HUMAN), 25%, 50ML: HCPCS | Performed by: INTERNAL MEDICINE

## 2025-08-13 PROCEDURE — 71045 X-RAY EXAM CHEST 1 VIEW: CPT

## 2025-08-13 PROCEDURE — 86140 C-REACTIVE PROTEIN: CPT | Performed by: FAMILY MEDICINE

## 2025-08-13 PROCEDURE — 99233 SBSQ HOSP IP/OBS HIGH 50: CPT | Performed by: FAMILY MEDICINE

## 2025-08-13 RX ORDER — VANCOMYCIN HYDROCHLORIDE 500 MG/10ML
500 INJECTION, POWDER, LYOPHILIZED, FOR SOLUTION INTRAVENOUS EVERY 24 HOURS
Status: DISCONTINUED | OUTPATIENT
Start: 2025-08-13 | End: 2025-08-15

## 2025-08-13 RX ADMIN — MIDODRINE HYDROCHLORIDE 10 MG: 5 TABLET ORAL at 11:01

## 2025-08-13 RX ADMIN — OCTREOTIDE ACETATE 50 MCG/HR: 500 INJECTION, SOLUTION INTRAVENOUS; SUBCUTANEOUS at 08:24

## 2025-08-13 RX ADMIN — MIDODRINE HYDROCHLORIDE 10 MG: 5 TABLET ORAL at 19:36

## 2025-08-13 RX ADMIN — MIDODRINE HYDROCHLORIDE 10 MG: 5 TABLET ORAL at 15:02

## 2025-08-13 RX ADMIN — LACTULOSE SOLUTION USP, 10 G/15 ML 10 G: 10 SOLUTION ORAL; RECTAL at 08:35

## 2025-08-13 RX ADMIN — RIFAXIMIN 550 MG: 550 TABLET ORAL at 08:35

## 2025-08-13 RX ADMIN — RIFAXIMIN 550 MG: 550 TABLET ORAL at 14:59

## 2025-08-13 RX ADMIN — Medication 1 TABLET: at 08:34

## 2025-08-13 RX ADMIN — MIDODRINE HYDROCHLORIDE 10 MG: 5 TABLET ORAL at 08:34

## 2025-08-13 RX ADMIN — PANTOPRAZOLE SODIUM 40 MG: 40 INJECTION, POWDER, FOR SOLUTION INTRAVENOUS at 11:01

## 2025-08-13 RX ADMIN — THIAMINE HCL TAB 100 MG 100 MG: 100 TAB at 08:34

## 2025-08-13 RX ADMIN — ALBUMIN HUMAN 100 G: 0.25 SOLUTION INTRAVENOUS at 08:46

## 2025-08-13 RX ADMIN — PANTOPRAZOLE SODIUM 40 MG: 40 INJECTION, POWDER, FOR SOLUTION INTRAVENOUS at 08:36

## 2025-08-13 RX ADMIN — CEFTRIAXONE SODIUM 1 G: 1 INJECTION, POWDER, FOR SOLUTION INTRAMUSCULAR; INTRAVENOUS at 10:43

## 2025-08-13 RX ADMIN — VANCOMYCIN HYDROCHLORIDE 500 MG: 500 INJECTION, POWDER, LYOPHILIZED, FOR SOLUTION INTRAVENOUS at 15:11

## 2025-08-13 RX ADMIN — RIFAXIMIN 550 MG: 550 TABLET ORAL at 19:36

## 2025-08-13 ASSESSMENT — ACTIVITIES OF DAILY LIVING (ADL)
ADLS_ACUITY_SCORE: 46
ADLS_ACUITY_SCORE: 49
ADLS_ACUITY_SCORE: 46
ADLS_ACUITY_SCORE: 49
ADLS_ACUITY_SCORE: 46
ADLS_ACUITY_SCORE: 49
ADLS_ACUITY_SCORE: 45
ADLS_ACUITY_SCORE: 49
ADLS_ACUITY_SCORE: 46
ADLS_ACUITY_SCORE: 46
ADLS_ACUITY_SCORE: 49

## 2025-08-14 ENCOUNTER — ANESTHESIA EVENT (OUTPATIENT)
Dept: SURGERY | Facility: HOSPITAL | Age: 75
End: 2025-08-14
Payer: COMMERCIAL

## 2025-08-14 ENCOUNTER — ANESTHESIA (OUTPATIENT)
Dept: SURGERY | Facility: HOSPITAL | Age: 75
End: 2025-08-14
Payer: COMMERCIAL

## 2025-08-14 VITALS
SYSTOLIC BLOOD PRESSURE: 127 MMHG | RESPIRATION RATE: 18 BRPM | DIASTOLIC BLOOD PRESSURE: 60 MMHG | HEART RATE: 66 BPM | HEIGHT: 69 IN | WEIGHT: 213.3 LBS | OXYGEN SATURATION: 94 % | BODY MASS INDEX: 31.59 KG/M2 | TEMPERATURE: 97.5 F

## 2025-08-14 PROBLEM — K76.7 HEPATORENAL SYNDROME (H): Status: ACTIVE | Noted: 2025-08-14

## 2025-08-14 PROBLEM — K72.90 DECOMPENSATION OF CIRRHOSIS OF LIVER (H): Status: ACTIVE | Noted: 2025-08-14

## 2025-08-14 PROBLEM — I34.0 MITRAL REGURGITATION: Status: ACTIVE | Noted: 2025-08-14

## 2025-08-14 PROBLEM — K74.60 DECOMPENSATION OF CIRRHOSIS OF LIVER (H): Status: ACTIVE | Noted: 2025-08-14

## 2025-08-14 LAB
ALBUMIN SERPL BCG-MCNC: 4.8 G/DL (ref 3.5–5.2)
ALP SERPL-CCNC: 87 U/L (ref 40–150)
ALT SERPL W P-5'-P-CCNC: 47 U/L (ref 0–70)
ANION GAP SERPL CALCULATED.3IONS-SCNC: 18 MMOL/L (ref 7–15)
AST SERPL W P-5'-P-CCNC: 80 U/L (ref 0–45)
BACTERIA FLD CULT: NORMAL
BACTERIA SPEC CULT: NORMAL
BILIRUB DIRECT SERPL-MCNC: 8.79 MG/DL (ref 0–0.3)
BILIRUB SERPL-MCNC: 15.6 MG/DL
BLD PROD TYP BPU: NORMAL
BLOOD COMPONENT TYPE: NORMAL
BUN SERPL-MCNC: 117.1 MG/DL (ref 8–23)
CALCIUM SERPL-MCNC: 10.2 MG/DL (ref 8.8–10.4)
CHLORIDE SERPL-SCNC: 96 MMOL/L (ref 98–107)
CODING SYSTEM: NORMAL
CREAT SERPL-MCNC: 4.96 MG/DL (ref 0.67–1.17)
CROSSMATCH: NORMAL
EGFRCR SERPLBLD CKD-EPI 2021: 12 ML/MIN/1.73M2
ERYTHROCYTE [DISTWIDTH] IN BLOOD BY AUTOMATED COUNT: 17.1 % (ref 10–15)
GLUCOSE BLDC GLUCOMTR-MCNC: 151 MG/DL (ref 70–99)
GLUCOSE SERPL-MCNC: 171 MG/DL (ref 70–99)
GRAM STAIN RESULT: NORMAL
GRAM STAIN RESULT: NORMAL
HCO3 SERPL-SCNC: 19 MMOL/L (ref 22–29)
HCT VFR BLD AUTO: 21.4 % (ref 40–53)
HGB BLD-MCNC: 7.6 G/DL (ref 13.3–17.7)
ISSUE DATE AND TIME: NORMAL
MCH RBC QN AUTO: 33.9 PG (ref 26.5–33)
MCHC RBC AUTO-ENTMCNC: 35.5 G/DL (ref 31.5–36.5)
MCV RBC AUTO: 95.5 FL (ref 78–100)
PLATELET # BLD AUTO: 125 10E3/UL (ref 150–450)
POTASSIUM SERPL-SCNC: 4.2 MMOL/L (ref 3.4–5.3)
PROT SERPL-MCNC: 6.4 G/DL (ref 6.4–8.3)
RBC # BLD AUTO: 2.24 10E6/UL (ref 4.4–5.9)
SODIUM SERPL-SCNC: 133 MMOL/L (ref 135–145)
UNIT ABO/RH: NORMAL
UNIT NUMBER: NORMAL
UNIT STATUS: NORMAL
UNIT TYPE ISBT: 6200
UPPER GI ENDOSCOPY: NORMAL
VANCOMYCIN SERPL-MCNC: 16 UG/ML (ref ?–25)
WBC # BLD AUTO: 11.47 10E3/UL (ref 4–11)

## 2025-08-14 PROCEDURE — 88305 TISSUE EXAM BY PATHOLOGIST: CPT | Mod: TC | Performed by: INTERNAL MEDICINE

## 2025-08-14 PROCEDURE — P9016 RBC LEUKOCYTES REDUCED: HCPCS

## 2025-08-14 PROCEDURE — 258N000003 HC RX IP 258 OP 636: Performed by: NURSE ANESTHETIST, CERTIFIED REGISTERED

## 2025-08-14 PROCEDURE — 250N000011 HC RX IP 250 OP 636: Performed by: HOSPITALIST

## 2025-08-14 PROCEDURE — 250N000013 HC RX MED GY IP 250 OP 250 PS 637: Performed by: HOSPITALIST

## 2025-08-14 PROCEDURE — 250N000011 HC RX IP 250 OP 636: Performed by: STUDENT IN AN ORGANIZED HEALTH CARE EDUCATION/TRAINING PROGRAM

## 2025-08-14 PROCEDURE — 250N000011 HC RX IP 250 OP 636: Performed by: INTERNAL MEDICINE

## 2025-08-14 PROCEDURE — 80048 BASIC METABOLIC PNL TOTAL CA: CPT | Performed by: INTERNAL MEDICINE

## 2025-08-14 PROCEDURE — 120N000001 HC R&B MED SURG/OB

## 2025-08-14 PROCEDURE — 258N000003 HC RX IP 258 OP 636: Performed by: ANESTHESIOLOGY

## 2025-08-14 PROCEDURE — 82248 BILIRUBIN DIRECT: CPT | Performed by: HOSPITALIST

## 2025-08-14 PROCEDURE — 99232 SBSQ HOSP IP/OBS MODERATE 35: CPT | Performed by: HOSPITALIST

## 2025-08-14 PROCEDURE — 250N000011 HC RX IP 250 OP 636: Performed by: FAMILY MEDICINE

## 2025-08-14 PROCEDURE — 272N000001 HC OR GENERAL SUPPLY STERILE: Performed by: INTERNAL MEDICINE

## 2025-08-14 PROCEDURE — 0DB68ZX EXCISION OF STOMACH, VIA NATURAL OR ARTIFICIAL OPENING ENDOSCOPIC, DIAGNOSTIC: ICD-10-PCS | Performed by: INTERNAL MEDICINE

## 2025-08-14 PROCEDURE — P9040 RBC LEUKOREDUCED IRRADIATED: HCPCS | Performed by: HOSPITALIST

## 2025-08-14 PROCEDURE — XW03367 INTRODUCTION OF TERLIPRESSIN INTO PERIPHERAL VEIN, PERCUTANEOUS APPROACH, NEW TECHNOLOGY GROUP 7: ICD-10-PCS | Performed by: INTERNAL MEDICINE

## 2025-08-14 PROCEDURE — 370N000017 HC ANESTHESIA TECHNICAL FEE, PER MIN: Performed by: INTERNAL MEDICINE

## 2025-08-14 PROCEDURE — 0W3P8ZZ CONTROL BLEEDING IN GASTROINTESTINAL TRACT, VIA NATURAL OR ARTIFICIAL OPENING ENDOSCOPIC: ICD-10-PCS | Performed by: INTERNAL MEDICINE

## 2025-08-14 PROCEDURE — 250N000009 HC RX 250: Performed by: NURSE ANESTHETIST, CERTIFIED REGISTERED

## 2025-08-14 PROCEDURE — 80202 ASSAY OF VANCOMYCIN: CPT | Performed by: FAMILY MEDICINE

## 2025-08-14 PROCEDURE — 360N000075 HC SURGERY LEVEL 2, PER MIN: Performed by: INTERNAL MEDICINE

## 2025-08-14 PROCEDURE — 85027 COMPLETE CBC AUTOMATED: CPT | Performed by: FAMILY MEDICINE

## 2025-08-14 PROCEDURE — 36415 COLL VENOUS BLD VENIPUNCTURE: CPT | Performed by: FAMILY MEDICINE

## 2025-08-14 PROCEDURE — C9399 UNCLASSIFIED DRUGS OR BIOLOG: HCPCS | Performed by: HOSPITALIST

## 2025-08-14 PROCEDURE — 99232 SBSQ HOSP IP/OBS MODERATE 35: CPT | Performed by: INTERNAL MEDICINE

## 2025-08-14 PROCEDURE — 999N000141 HC STATISTIC PRE-PROCEDURE NURSING ASSESSMENT: Performed by: INTERNAL MEDICINE

## 2025-08-14 PROCEDURE — 250N000011 HC RX IP 250 OP 636: Performed by: NURSE ANESTHETIST, CERTIFIED REGISTERED

## 2025-08-14 PROCEDURE — 250N000013 HC RX MED GY IP 250 OP 250 PS 637: Performed by: STUDENT IN AN ORGANIZED HEALTH CARE EDUCATION/TRAINING PROGRAM

## 2025-08-14 RX ORDER — LIDOCAINE 40 MG/G
CREAM TOPICAL
Status: DISCONTINUED | OUTPATIENT
Start: 2025-08-14 | End: 2025-08-14 | Stop reason: HOSPADM

## 2025-08-14 RX ORDER — CEFTRIAXONE 2 G/1
2 INJECTION, POWDER, FOR SOLUTION INTRAMUSCULAR; INTRAVENOUS EVERY 24 HOURS
Status: DISCONTINUED | OUTPATIENT
Start: 2025-08-15 | End: 2025-08-19 | Stop reason: HOSPADM

## 2025-08-14 RX ORDER — ONDANSETRON 2 MG/ML
4 INJECTION INTRAMUSCULAR; INTRAVENOUS EVERY 30 MIN PRN
Status: DISCONTINUED | OUTPATIENT
Start: 2025-08-14 | End: 2025-08-14 | Stop reason: HOSPADM

## 2025-08-14 RX ORDER — DEXAMETHASONE SODIUM PHOSPHATE 4 MG/ML
4 INJECTION, SOLUTION INTRA-ARTICULAR; INTRALESIONAL; INTRAMUSCULAR; INTRAVENOUS; SOFT TISSUE
Status: DISCONTINUED | OUTPATIENT
Start: 2025-08-14 | End: 2025-08-14 | Stop reason: HOSPADM

## 2025-08-14 RX ORDER — PROPOFOL 10 MG/ML
INJECTION, EMULSION INTRAVENOUS CONTINUOUS PRN
Status: DISCONTINUED | OUTPATIENT
Start: 2025-08-14 | End: 2025-08-14

## 2025-08-14 RX ORDER — PROPOFOL 10 MG/ML
INJECTION, EMULSION INTRAVENOUS PRN
Status: DISCONTINUED | OUTPATIENT
Start: 2025-08-14 | End: 2025-08-14

## 2025-08-14 RX ORDER — EPINEPHRINE 1 MG/ML(1)
AMPUL (ML) INJECTION PRN
Status: DISCONTINUED | OUTPATIENT
Start: 2025-08-14 | End: 2025-08-14 | Stop reason: HOSPADM

## 2025-08-14 RX ORDER — OXYCODONE HYDROCHLORIDE 5 MG/1
5 TABLET ORAL
Status: DISCONTINUED | OUTPATIENT
Start: 2025-08-14 | End: 2025-08-14 | Stop reason: HOSPADM

## 2025-08-14 RX ORDER — SODIUM CHLORIDE 9 MG/ML
INJECTION, SOLUTION INTRAVENOUS CONTINUOUS PRN
Status: DISCONTINUED | OUTPATIENT
Start: 2025-08-14 | End: 2025-08-14

## 2025-08-14 RX ORDER — OXYCODONE HYDROCHLORIDE 5 MG/1
10 TABLET ORAL
Status: DISCONTINUED | OUTPATIENT
Start: 2025-08-14 | End: 2025-08-14 | Stop reason: HOSPADM

## 2025-08-14 RX ORDER — ONDANSETRON 4 MG/1
4 TABLET, ORALLY DISINTEGRATING ORAL EVERY 30 MIN PRN
Status: DISCONTINUED | OUTPATIENT
Start: 2025-08-14 | End: 2025-08-14 | Stop reason: HOSPADM

## 2025-08-14 RX ORDER — NALOXONE HYDROCHLORIDE 1 MG/ML
0.1 INJECTION INTRAMUSCULAR; INTRAVENOUS; SUBCUTANEOUS
Status: DISCONTINUED | OUTPATIENT
Start: 2025-08-14 | End: 2025-08-14 | Stop reason: HOSPADM

## 2025-08-14 RX ORDER — SODIUM CHLORIDE, SODIUM LACTATE, POTASSIUM CHLORIDE, CALCIUM CHLORIDE 600; 310; 30; 20 MG/100ML; MG/100ML; MG/100ML; MG/100ML
INJECTION, SOLUTION INTRAVENOUS CONTINUOUS
Status: DISCONTINUED | OUTPATIENT
Start: 2025-08-14 | End: 2025-08-14 | Stop reason: HOSPADM

## 2025-08-14 RX ADMIN — ACETAMINOPHEN 650 MG: 325 TABLET ORAL at 13:37

## 2025-08-14 RX ADMIN — PANTOPRAZOLE SODIUM 40 MG: 40 INJECTION, POWDER, FOR SOLUTION INTRAVENOUS at 11:21

## 2025-08-14 RX ADMIN — PROPOFOL 150 MCG/KG/MIN: 10 INJECTION, EMULSION INTRAVENOUS at 08:52

## 2025-08-14 RX ADMIN — Medication 5 MG: at 14:17

## 2025-08-14 RX ADMIN — MIDODRINE HYDROCHLORIDE 10 MG: 5 TABLET ORAL at 16:29

## 2025-08-14 RX ADMIN — SODIUM CHLORIDE, SODIUM LACTATE, POTASSIUM CHLORIDE, AND CALCIUM CHLORIDE: .6; .31; .03; .02 INJECTION, SOLUTION INTRAVENOUS at 09:32

## 2025-08-14 RX ADMIN — PROPOFOL 30 MG: 10 INJECTION, EMULSION INTRAVENOUS at 08:54

## 2025-08-14 RX ADMIN — RIFAXIMIN 550 MG: 550 TABLET ORAL at 13:31

## 2025-08-14 RX ADMIN — TERLIPRESSIN 0.85 MG: 0.85 INJECTION, POWDER, LYOPHILIZED, FOR SOLUTION INTRAVENOUS at 13:31

## 2025-08-14 RX ADMIN — VANCOMYCIN HYDROCHLORIDE 500 MG: 500 INJECTION, POWDER, LYOPHILIZED, FOR SOLUTION INTRAVENOUS at 13:32

## 2025-08-14 RX ADMIN — SODIUM CHLORIDE: 9 INJECTION, SOLUTION INTRAVENOUS at 08:54

## 2025-08-14 RX ADMIN — MIDODRINE HYDROCHLORIDE 10 MG: 5 TABLET ORAL at 19:29

## 2025-08-14 RX ADMIN — TERLIPRESSIN 0.85 MG: 0.85 INJECTION, POWDER, LYOPHILIZED, FOR SOLUTION INTRAVENOUS at 19:31

## 2025-08-14 RX ADMIN — RIFAXIMIN 550 MG: 550 TABLET ORAL at 19:30

## 2025-08-14 RX ADMIN — MIDODRINE HYDROCHLORIDE 10 MG: 5 TABLET ORAL at 11:21

## 2025-08-14 RX ADMIN — CEFTRIAXONE SODIUM 1 G: 1 INJECTION, POWDER, FOR SOLUTION INTRAMUSCULAR; INTRAVENOUS at 10:42

## 2025-08-14 RX ADMIN — SODIUM CHLORIDE 12 MCG: 9 INJECTION, SOLUTION INTRAVENOUS at 08:54

## 2025-08-14 ASSESSMENT — ACTIVITIES OF DAILY LIVING (ADL)
ADLS_ACUITY_SCORE: 42
ADLS_ACUITY_SCORE: 46
ADLS_ACUITY_SCORE: 42
ADLS_ACUITY_SCORE: 45
ADLS_ACUITY_SCORE: 46
ADLS_ACUITY_SCORE: 42
ADLS_ACUITY_SCORE: 46
ADLS_ACUITY_SCORE: 42
ADLS_ACUITY_SCORE: 46

## 2025-08-15 ENCOUNTER — APPOINTMENT (OUTPATIENT)
Dept: INTERVENTIONAL RADIOLOGY/VASCULAR | Facility: HOSPITAL | Age: 75
DRG: 441 | End: 2025-08-15
Attending: NURSE PRACTITIONER
Payer: COMMERCIAL

## 2025-08-15 ENCOUNTER — APPOINTMENT (OUTPATIENT)
Dept: OCCUPATIONAL THERAPY | Facility: HOSPITAL | Age: 75
DRG: 441 | End: 2025-08-15
Attending: FAMILY MEDICINE
Payer: COMMERCIAL

## 2025-08-15 ENCOUNTER — APPOINTMENT (OUTPATIENT)
Dept: PHYSICAL THERAPY | Facility: HOSPITAL | Age: 75
DRG: 441 | End: 2025-08-15
Attending: FAMILY MEDICINE
Payer: COMMERCIAL

## 2025-08-15 PROBLEM — K26.9 DUODENAL ULCER: Status: ACTIVE | Noted: 2025-08-15

## 2025-08-15 PROBLEM — D69.6 THROMBOCYTOPENIA: Status: ACTIVE | Noted: 2025-08-15

## 2025-08-15 LAB
AFP SERPL-MCNC: 5.3 NG/ML
ALBUMIN SERPL BCG-MCNC: 4.5 G/DL (ref 3.5–5.2)
ALP SERPL-CCNC: 89 U/L (ref 40–150)
ALT SERPL W P-5'-P-CCNC: 47 U/L (ref 0–70)
ANION GAP SERPL CALCULATED.3IONS-SCNC: 21 MMOL/L (ref 7–15)
AST SERPL W P-5'-P-CCNC: 77 U/L (ref 0–45)
BILIRUB SERPL-MCNC: 17.8 MG/DL
BUN SERPL-MCNC: 122.9 MG/DL (ref 8–23)
CALCIUM SERPL-MCNC: 10 MG/DL (ref 8.8–10.4)
CHLORIDE SERPL-SCNC: 93 MMOL/L (ref 98–107)
CREAT SERPL-MCNC: 5.21 MG/DL (ref 0.67–1.17)
EGFRCR SERPLBLD CKD-EPI 2021: 11 ML/MIN/1.73M2
ERYTHROCYTE [DISTWIDTH] IN BLOOD BY AUTOMATED COUNT: 18.7 % (ref 10–15)
GLUCOSE SERPL-MCNC: 353 MG/DL (ref 70–99)
HCO3 SERPL-SCNC: 15 MMOL/L (ref 22–29)
HCT VFR BLD AUTO: 25.1 % (ref 40–53)
HGB BLD-MCNC: 8.8 G/DL (ref 13.3–17.7)
INR PPP: 2.37 (ref 0.85–1.15)
MCH RBC QN AUTO: 33.6 PG (ref 26.5–33)
MCHC RBC AUTO-ENTMCNC: 35.1 G/DL (ref 31.5–36.5)
MCV RBC AUTO: 95.8 FL (ref 78–100)
PATH REPORT.COMMENTS IMP SPEC: NORMAL
PATH REPORT.COMMENTS IMP SPEC: NORMAL
PATH REPORT.FINAL DX SPEC: NORMAL
PATH REPORT.GROSS SPEC: NORMAL
PATH REPORT.MICROSCOPIC SPEC OTHER STN: NORMAL
PATH REPORT.RELEVANT HX SPEC: NORMAL
PHOTO IMAGE: NORMAL
PLATELET # BLD AUTO: 111 10E3/UL (ref 150–450)
POTASSIUM SERPL-SCNC: 4 MMOL/L (ref 3.4–5.3)
PROT SERPL-MCNC: 6.5 G/DL (ref 6.4–8.3)
PROTHROMBIN TIME: 26 SECONDS (ref 11.8–14.8)
RBC # BLD AUTO: 2.62 10E6/UL (ref 4.4–5.9)
SODIUM SERPL-SCNC: 129 MMOL/L (ref 135–145)
WBC # BLD AUTO: 10.68 10E3/UL (ref 4–11)

## 2025-08-15 PROCEDURE — 272N000500 HC NEEDLE CR2

## 2025-08-15 PROCEDURE — 250N000009 HC RX 250: Performed by: STUDENT IN AN ORGANIZED HEALTH CARE EDUCATION/TRAINING PROGRAM

## 2025-08-15 PROCEDURE — 250N000013 HC RX MED GY IP 250 OP 250 PS 637: Performed by: STUDENT IN AN ORGANIZED HEALTH CARE EDUCATION/TRAINING PROGRAM

## 2025-08-15 PROCEDURE — 85014 HEMATOCRIT: CPT | Performed by: HOSPITALIST

## 2025-08-15 PROCEDURE — C1752 CATH,HEMODIALYSIS,SHORT-TERM: HCPCS

## 2025-08-15 PROCEDURE — 99223 1ST HOSP IP/OBS HIGH 75: CPT | Performed by: NURSE PRACTITIONER

## 2025-08-15 PROCEDURE — 88305 TISSUE EXAM BY PATHOLOGIST: CPT | Mod: 26 | Performed by: PATHOLOGY

## 2025-08-15 PROCEDURE — 250N000011 HC RX IP 250 OP 636: Performed by: RADIOLOGY

## 2025-08-15 PROCEDURE — 02H633Z INSERTION OF INFUSION DEVICE INTO RIGHT ATRIUM, PERCUTANEOUS APPROACH: ICD-10-PCS | Performed by: RADIOLOGY

## 2025-08-15 PROCEDURE — 258N000003 HC RX IP 258 OP 636: Performed by: INTERNAL MEDICINE

## 2025-08-15 PROCEDURE — 86706 HEP B SURFACE ANTIBODY: CPT | Performed by: INTERNAL MEDICINE

## 2025-08-15 PROCEDURE — 85610 PROTHROMBIN TIME: CPT | Performed by: HOSPITALIST

## 2025-08-15 PROCEDURE — 97116 GAIT TRAINING THERAPY: CPT | Mod: GP

## 2025-08-15 PROCEDURE — 250N000011 HC RX IP 250 OP 636: Performed by: STUDENT IN AN ORGANIZED HEALTH CARE EDUCATION/TRAINING PROGRAM

## 2025-08-15 PROCEDURE — 120N000001 HC R&B MED SURG/OB

## 2025-08-15 PROCEDURE — 99232 SBSQ HOSP IP/OBS MODERATE 35: CPT | Performed by: INTERNAL MEDICINE

## 2025-08-15 PROCEDURE — 84155 ASSAY OF PROTEIN SERUM: CPT | Performed by: HOSPITALIST

## 2025-08-15 PROCEDURE — 250N000011 HC RX IP 250 OP 636: Performed by: INTERNAL MEDICINE

## 2025-08-15 PROCEDURE — C9399 UNCLASSIFIED DRUGS OR BIOLOG: HCPCS | Performed by: HOSPITALIST

## 2025-08-15 PROCEDURE — 77001 FLUOROGUIDE FOR VEIN DEVICE: CPT

## 2025-08-15 PROCEDURE — 97530 THERAPEUTIC ACTIVITIES: CPT | Mod: GP

## 2025-08-15 PROCEDURE — P9047 ALBUMIN (HUMAN), 25%, 50ML: HCPCS | Performed by: INTERNAL MEDICINE

## 2025-08-15 PROCEDURE — 97535 SELF CARE MNGMENT TRAINING: CPT | Mod: GO

## 2025-08-15 PROCEDURE — 82105 ALPHA-FETOPROTEIN SERUM: CPT | Performed by: HOSPITALIST

## 2025-08-15 PROCEDURE — 250N000011 HC RX IP 250 OP 636: Performed by: HOSPITALIST

## 2025-08-15 PROCEDURE — 36415 COLL VENOUS BLD VENIPUNCTURE: CPT | Performed by: HOSPITALIST

## 2025-08-15 PROCEDURE — 87340 HEPATITIS B SURFACE AG IA: CPT | Performed by: INTERNAL MEDICINE

## 2025-08-15 PROCEDURE — 99232 SBSQ HOSP IP/OBS MODERATE 35: CPT | Performed by: HOSPITALIST

## 2025-08-15 PROCEDURE — 5A1D70Z PERFORMANCE OF URINARY FILTRATION, INTERMITTENT, LESS THAN 6 HOURS PER DAY: ICD-10-PCS | Performed by: HOSPITALIST

## 2025-08-15 PROCEDURE — 90935 HEMODIALYSIS ONE EVALUATION: CPT

## 2025-08-15 PROCEDURE — C1769 GUIDE WIRE: HCPCS

## 2025-08-15 RX ORDER — ALBUMIN (HUMAN) 12.5 G/50ML
50 SOLUTION INTRAVENOUS
Status: DISCONTINUED | OUTPATIENT
Start: 2025-08-15 | End: 2025-08-15

## 2025-08-15 RX ORDER — HEPARIN SODIUM 1000 [USP'U]/ML
2000 INJECTION, SOLUTION INTRAVENOUS; SUBCUTANEOUS ONCE
Status: COMPLETED | OUTPATIENT
Start: 2025-08-15 | End: 2025-08-15

## 2025-08-15 RX ORDER — HEPARIN SODIUM 1000 [USP'U]/ML
2800 INJECTION, SOLUTION INTRAVENOUS; SUBCUTANEOUS ONCE
Status: COMPLETED | OUTPATIENT
Start: 2025-08-15 | End: 2025-08-15

## 2025-08-15 RX ADMIN — CEFTRIAXONE SODIUM 2 G: 2 INJECTION, POWDER, FOR SOLUTION INTRAMUSCULAR; INTRAVENOUS at 10:16

## 2025-08-15 RX ADMIN — THIAMINE HCL TAB 100 MG 100 MG: 100 TAB at 08:17

## 2025-08-15 RX ADMIN — HEPARIN SODIUM 2000 UNITS: 1000 INJECTION INTRAVENOUS; SUBCUTANEOUS at 14:56

## 2025-08-15 RX ADMIN — LIDOCAINE HYDROCHLORIDE 1 ML: 10 INJECTION, SOLUTION INFILTRATION; PERINEURAL at 14:47

## 2025-08-15 RX ADMIN — SODIUM CHLORIDE 500 ML: 9 INJECTION, SOLUTION INTRAVENOUS at 18:23

## 2025-08-15 RX ADMIN — RIFAXIMIN 550 MG: 550 TABLET ORAL at 14:03

## 2025-08-15 RX ADMIN — MIDODRINE HYDROCHLORIDE 10 MG: 5 TABLET ORAL at 11:35

## 2025-08-15 RX ADMIN — HEPARIN SODIUM 2800 UNITS: 1000 INJECTION INTRAVENOUS; SUBCUTANEOUS at 14:51

## 2025-08-15 RX ADMIN — TERLIPRESSIN 0.85 MG: 0.85 INJECTION, POWDER, LYOPHILIZED, FOR SOLUTION INTRAVENOUS at 08:25

## 2025-08-15 RX ADMIN — RIFAXIMIN 550 MG: 550 TABLET ORAL at 08:18

## 2025-08-15 RX ADMIN — RIFAXIMIN 550 MG: 550 TABLET ORAL at 20:07

## 2025-08-15 RX ADMIN — PANTOPRAZOLE SODIUM 40 MG: 40 INJECTION, POWDER, FOR SOLUTION INTRAVENOUS at 11:35

## 2025-08-15 RX ADMIN — Medication 1 TABLET: at 08:17

## 2025-08-15 RX ADMIN — LACTULOSE SOLUTION USP, 10 G/15 ML 10 G: 10 SOLUTION ORAL; RECTAL at 08:18

## 2025-08-15 RX ADMIN — SODIUM CHLORIDE 150 ML: 900 INJECTION, SOLUTION INTRAVENOUS at 16:15

## 2025-08-15 RX ADMIN — HEPARIN SODIUM 1800 UNITS: 1000 INJECTION, SOLUTION INTRAVENOUS; SUBCUTANEOUS at 17:25

## 2025-08-15 RX ADMIN — PANTOPRAZOLE SODIUM 40 MG: 40 INJECTION, POWDER, FOR SOLUTION INTRAVENOUS at 08:17

## 2025-08-15 RX ADMIN — TERLIPRESSIN 0.85 MG: 0.85 INJECTION, POWDER, LYOPHILIZED, FOR SOLUTION INTRAVENOUS at 20:07

## 2025-08-15 RX ADMIN — TERLIPRESSIN 0.85 MG: 0.85 INJECTION, POWDER, LYOPHILIZED, FOR SOLUTION INTRAVENOUS at 01:15

## 2025-08-15 RX ADMIN — MIDODRINE HYDROCHLORIDE 10 MG: 5 TABLET ORAL at 08:17

## 2025-08-15 RX ADMIN — ALBUMIN HUMAN 50 ML: 0.25 SOLUTION INTRAVENOUS at 17:20

## 2025-08-15 ASSESSMENT — ACTIVITIES OF DAILY LIVING (ADL)
ADLS_ACUITY_SCORE: 45
ADLS_ACUITY_SCORE: 45
ADLS_ACUITY_SCORE: 42
ADLS_ACUITY_SCORE: 41
ADLS_ACUITY_SCORE: 45
ADLS_ACUITY_SCORE: 45
ADLS_ACUITY_SCORE: 42
ADLS_ACUITY_SCORE: 45
ADLS_ACUITY_SCORE: 42
ADLS_ACUITY_SCORE: 41
ADLS_ACUITY_SCORE: 42
ADLS_ACUITY_SCORE: 45
ADLS_ACUITY_SCORE: 42
ADLS_ACUITY_SCORE: 41
ADLS_ACUITY_SCORE: 41
ADLS_ACUITY_SCORE: 42
ADLS_ACUITY_SCORE: 45
ADLS_ACUITY_SCORE: 45
ADLS_ACUITY_SCORE: 42
ADLS_ACUITY_SCORE: 42
ADLS_ACUITY_SCORE: 45
ADLS_ACUITY_SCORE: 42
ADLS_ACUITY_SCORE: 45

## 2025-08-16 LAB
ALBUMIN SERPL BCG-MCNC: 4.3 G/DL (ref 3.5–5.2)
ALP SERPL-CCNC: 95 U/L (ref 40–150)
ALT SERPL W P-5'-P-CCNC: 46 U/L (ref 0–70)
ANION GAP SERPL CALCULATED.3IONS-SCNC: 20 MMOL/L (ref 7–15)
AST SERPL W P-5'-P-CCNC: 64 U/L (ref 0–45)
BACTERIA FLD CULT: NO GROWTH
BACTERIA SPEC CULT: NO GROWTH
BACTERIA SPEC CULT: NO GROWTH
BILIRUB SERPL-MCNC: 15.6 MG/DL
BUN SERPL-MCNC: 81 MG/DL (ref 8–23)
CALCIUM SERPL-MCNC: 10 MG/DL (ref 8.8–10.4)
CHLORIDE SERPL-SCNC: 97 MMOL/L (ref 98–107)
CREAT SERPL-MCNC: 3.84 MG/DL (ref 0.67–1.17)
EGFRCR SERPLBLD CKD-EPI 2021: 16 ML/MIN/1.73M2
ERYTHROCYTE [DISTWIDTH] IN BLOOD BY AUTOMATED COUNT: 17.8 % (ref 10–15)
GLUCOSE SERPL-MCNC: 244 MG/DL (ref 70–99)
GRAM STAIN RESULT: NORMAL
GRAM STAIN RESULT: NORMAL
HBV SURFACE AB SERPL IA-ACNC: <3.5 M[IU]/ML
HBV SURFACE AB SERPL IA-ACNC: NONREACTIVE M[IU]/ML
HBV SURFACE AG SERPL QL IA: NONREACTIVE
HCO3 SERPL-SCNC: 18 MMOL/L (ref 22–29)
HCT VFR BLD AUTO: 22.8 % (ref 40–53)
HGB BLD-MCNC: 8.2 G/DL (ref 13.3–17.7)
MCH RBC QN AUTO: 33.3 PG (ref 26.5–33)
MCHC RBC AUTO-ENTMCNC: 36 G/DL (ref 31.5–36.5)
MCV RBC AUTO: 92.7 FL (ref 78–100)
PLATELET # BLD AUTO: 121 10E3/UL (ref 150–450)
POTASSIUM SERPL-SCNC: 3.6 MMOL/L (ref 3.4–5.3)
PROT SERPL-MCNC: 6.1 G/DL (ref 6.4–8.3)
RBC # BLD AUTO: 2.46 10E6/UL (ref 4.4–5.9)
SODIUM SERPL-SCNC: 135 MMOL/L (ref 135–145)
WBC # BLD AUTO: 8.11 10E3/UL (ref 4–11)

## 2025-08-16 PROCEDURE — 36415 COLL VENOUS BLD VENIPUNCTURE: CPT | Performed by: HOSPITALIST

## 2025-08-16 PROCEDURE — 90935 HEMODIALYSIS ONE EVALUATION: CPT | Performed by: INTERNAL MEDICINE

## 2025-08-16 PROCEDURE — 250N000013 HC RX MED GY IP 250 OP 250 PS 637: Performed by: STUDENT IN AN ORGANIZED HEALTH CARE EDUCATION/TRAINING PROGRAM

## 2025-08-16 PROCEDURE — 99232 SBSQ HOSP IP/OBS MODERATE 35: CPT | Performed by: HOSPITALIST

## 2025-08-16 PROCEDURE — C9399 UNCLASSIFIED DRUGS OR BIOLOG: HCPCS | Performed by: HOSPITALIST

## 2025-08-16 PROCEDURE — 250N000011 HC RX IP 250 OP 636: Performed by: HOSPITALIST

## 2025-08-16 PROCEDURE — 90935 HEMODIALYSIS ONE EVALUATION: CPT

## 2025-08-16 PROCEDURE — 82310 ASSAY OF CALCIUM: CPT | Performed by: HOSPITALIST

## 2025-08-16 PROCEDURE — 120N000001 HC R&B MED SURG/OB

## 2025-08-16 PROCEDURE — 250N000011 HC RX IP 250 OP 636: Performed by: STUDENT IN AN ORGANIZED HEALTH CARE EDUCATION/TRAINING PROGRAM

## 2025-08-16 PROCEDURE — 85027 COMPLETE CBC AUTOMATED: CPT | Performed by: HOSPITALIST

## 2025-08-16 RX ORDER — ALBUMIN (HUMAN) 12.5 G/50ML
50 SOLUTION INTRAVENOUS
Status: DISCONTINUED | OUTPATIENT
Start: 2025-08-16 | End: 2025-08-18

## 2025-08-16 RX ADMIN — RIFAXIMIN 550 MG: 550 TABLET ORAL at 11:50

## 2025-08-16 RX ADMIN — TERLIPRESSIN 0.85 MG: 0.85 INJECTION, POWDER, LYOPHILIZED, FOR SOLUTION INTRAVENOUS at 02:24

## 2025-08-16 RX ADMIN — MIDODRINE HYDROCHLORIDE 10 MG: 5 TABLET ORAL at 11:50

## 2025-08-16 RX ADMIN — THIAMINE HCL TAB 100 MG 100 MG: 100 TAB at 11:50

## 2025-08-16 RX ADMIN — LACTULOSE SOLUTION USP, 10 G/15 ML 10 G: 10 SOLUTION ORAL; RECTAL at 12:00

## 2025-08-16 RX ADMIN — PANTOPRAZOLE SODIUM 40 MG: 40 INJECTION, POWDER, FOR SOLUTION INTRAVENOUS at 12:23

## 2025-08-16 RX ADMIN — MIDODRINE HYDROCHLORIDE 10 MG: 5 TABLET ORAL at 16:17

## 2025-08-16 RX ADMIN — Medication 1 TABLET: at 11:49

## 2025-08-16 RX ADMIN — CEFTRIAXONE SODIUM 2 G: 2 INJECTION, POWDER, FOR SOLUTION INTRAMUSCULAR; INTRAVENOUS at 12:25

## 2025-08-16 RX ADMIN — RIFAXIMIN 550 MG: 550 TABLET ORAL at 16:17

## 2025-08-16 RX ADMIN — RIFAXIMIN 550 MG: 550 TABLET ORAL at 20:06

## 2025-08-16 RX ADMIN — TERLIPRESSIN 0.85 MG: 0.85 INJECTION, POWDER, LYOPHILIZED, FOR SOLUTION INTRAVENOUS at 12:01

## 2025-08-16 RX ADMIN — MIDODRINE HYDROCHLORIDE 10 MG: 5 TABLET ORAL at 20:06

## 2025-08-16 RX ADMIN — MIDODRINE HYDROCHLORIDE 10 MG: 5 TABLET ORAL at 07:53

## 2025-08-16 ASSESSMENT — ACTIVITIES OF DAILY LIVING (ADL)
ADLS_ACUITY_SCORE: 40
ADLS_ACUITY_SCORE: 46
ADLS_ACUITY_SCORE: 41
ADLS_ACUITY_SCORE: 40
ADLS_ACUITY_SCORE: 42
ADLS_ACUITY_SCORE: 46
ADLS_ACUITY_SCORE: 41
ADLS_ACUITY_SCORE: 41
ADLS_ACUITY_SCORE: 42
ADLS_ACUITY_SCORE: 40
ADLS_ACUITY_SCORE: 41
ADLS_ACUITY_SCORE: 42
ADLS_ACUITY_SCORE: 40
ADLS_ACUITY_SCORE: 39
ADLS_ACUITY_SCORE: 40
ADLS_ACUITY_SCORE: 41
ADLS_ACUITY_SCORE: 42
ADLS_ACUITY_SCORE: 41
ADLS_ACUITY_SCORE: 39
ADLS_ACUITY_SCORE: 40
ADLS_ACUITY_SCORE: 42

## 2025-08-17 LAB
ALBUMIN SERPL BCG-MCNC: 4.3 G/DL (ref 3.5–5.2)
ALP SERPL-CCNC: 102 U/L (ref 40–150)
ALT SERPL W P-5'-P-CCNC: 48 U/L (ref 0–70)
ANION GAP SERPL CALCULATED.3IONS-SCNC: 15 MMOL/L (ref 7–15)
AST SERPL W P-5'-P-CCNC: 76 U/L (ref 0–45)
BILIRUB SERPL-MCNC: 15 MG/DL
BUN SERPL-MCNC: 52.9 MG/DL (ref 8–23)
CALCIUM SERPL-MCNC: 9.9 MG/DL (ref 8.8–10.4)
CHLORIDE SERPL-SCNC: 96 MMOL/L (ref 98–107)
CREAT SERPL-MCNC: 3.29 MG/DL (ref 0.67–1.17)
EGFRCR SERPLBLD CKD-EPI 2021: 19 ML/MIN/1.73M2
ERYTHROCYTE [DISTWIDTH] IN BLOOD BY AUTOMATED COUNT: 18.5 % (ref 10–15)
GLUCOSE SERPL-MCNC: 111 MG/DL (ref 70–99)
HCO3 SERPL-SCNC: 24 MMOL/L (ref 22–29)
HCT VFR BLD AUTO: 22.3 % (ref 40–53)
HGB BLD-MCNC: 8.1 G/DL (ref 13.3–17.7)
INR PPP: 2.09 (ref 0.85–1.15)
LACTATE SERPL-SCNC: 1.5 MMOL/L (ref 0.7–2)
MAGNESIUM SERPL-MCNC: 2 MG/DL (ref 1.7–2.3)
MCH RBC QN AUTO: 34 PG (ref 26.5–33)
MCHC RBC AUTO-ENTMCNC: 36.3 G/DL (ref 31.5–36.5)
MCV RBC AUTO: 93.7 FL (ref 78–100)
PHOSPHATE SERPL-MCNC: 3.4 MG/DL (ref 2.5–4.5)
PLATELET # BLD AUTO: 148 10E3/UL (ref 150–450)
POTASSIUM SERPL-SCNC: 3.4 MMOL/L (ref 3.4–5.3)
PROT SERPL-MCNC: 6.2 G/DL (ref 6.4–8.3)
PROTHROMBIN TIME: 23.6 SECONDS (ref 11.8–14.8)
RBC # BLD AUTO: 2.38 10E6/UL (ref 4.4–5.9)
SODIUM SERPL-SCNC: 135 MMOL/L (ref 135–145)
WBC # BLD AUTO: 8.75 10E3/UL (ref 4–11)

## 2025-08-17 PROCEDURE — 250N000013 HC RX MED GY IP 250 OP 250 PS 637: Performed by: STUDENT IN AN ORGANIZED HEALTH CARE EDUCATION/TRAINING PROGRAM

## 2025-08-17 PROCEDURE — 250N000011 HC RX IP 250 OP 636: Mod: JZ | Performed by: INTERNAL MEDICINE

## 2025-08-17 PROCEDURE — 120N000001 HC R&B MED SURG/OB

## 2025-08-17 PROCEDURE — 80053 COMPREHEN METABOLIC PANEL: CPT | Performed by: HOSPITALIST

## 2025-08-17 PROCEDURE — 250N000011 HC RX IP 250 OP 636: Performed by: HOSPITALIST

## 2025-08-17 PROCEDURE — 99232 SBSQ HOSP IP/OBS MODERATE 35: CPT | Performed by: INTERNAL MEDICINE

## 2025-08-17 PROCEDURE — 84100 ASSAY OF PHOSPHORUS: CPT | Performed by: INTERNAL MEDICINE

## 2025-08-17 PROCEDURE — P9045 ALBUMIN (HUMAN), 5%, 250 ML: HCPCS | Mod: JZ | Performed by: INTERNAL MEDICINE

## 2025-08-17 PROCEDURE — 99232 SBSQ HOSP IP/OBS MODERATE 35: CPT | Performed by: HOSPITALIST

## 2025-08-17 PROCEDURE — 250N000013 HC RX MED GY IP 250 OP 250 PS 637: Performed by: INTERNAL MEDICINE

## 2025-08-17 PROCEDURE — 36415 COLL VENOUS BLD VENIPUNCTURE: CPT | Performed by: HOSPITALIST

## 2025-08-17 PROCEDURE — 83605 ASSAY OF LACTIC ACID: CPT | Performed by: INTERNAL MEDICINE

## 2025-08-17 PROCEDURE — 85610 PROTHROMBIN TIME: CPT | Performed by: HOSPITALIST

## 2025-08-17 PROCEDURE — 85027 COMPLETE CBC AUTOMATED: CPT | Performed by: HOSPITALIST

## 2025-08-17 PROCEDURE — 250N000013 HC RX MED GY IP 250 OP 250 PS 637: Performed by: HOSPITALIST

## 2025-08-17 PROCEDURE — 250N000011 HC RX IP 250 OP 636: Performed by: STUDENT IN AN ORGANIZED HEALTH CARE EDUCATION/TRAINING PROGRAM

## 2025-08-17 PROCEDURE — 83735 ASSAY OF MAGNESIUM: CPT | Performed by: INTERNAL MEDICINE

## 2025-08-17 RX ORDER — PHYTONADIONE 5 MG/1
5 TABLET ORAL ONCE
Status: DISCONTINUED | OUTPATIENT
Start: 2025-08-17 | End: 2025-08-17

## 2025-08-17 RX ORDER — ALBUMIN (HUMAN) 12.5 G/50ML
50 SOLUTION INTRAVENOUS
Status: CANCELLED | OUTPATIENT
Start: 2025-08-18

## 2025-08-17 RX ORDER — DROXIDOPA 100 MG/1
100 CAPSULE ORAL 3 TIMES DAILY
Status: DISCONTINUED | OUTPATIENT
Start: 2025-08-17 | End: 2025-08-19 | Stop reason: HOSPADM

## 2025-08-17 RX ADMIN — PANTOPRAZOLE SODIUM 40 MG: 40 INJECTION, POWDER, FOR SOLUTION INTRAVENOUS at 09:19

## 2025-08-17 RX ADMIN — DROXIDOPA 100 MG: 100 CAPSULE ORAL at 14:42

## 2025-08-17 RX ADMIN — DROXIDOPA 100 MG: 100 CAPSULE ORAL at 20:55

## 2025-08-17 RX ADMIN — ALBUMIN HUMAN 12.5 G: 0.05 INJECTION, SOLUTION INTRAVENOUS at 04:24

## 2025-08-17 RX ADMIN — Medication 5 MG: at 09:34

## 2025-08-17 RX ADMIN — MIDODRINE HYDROCHLORIDE 10 MG: 5 TABLET ORAL at 20:54

## 2025-08-17 RX ADMIN — MIDODRINE HYDROCHLORIDE 10 MG: 5 TABLET ORAL at 04:24

## 2025-08-17 RX ADMIN — CEFTRIAXONE SODIUM 2 G: 2 INJECTION, POWDER, FOR SOLUTION INTRAMUSCULAR; INTRAVENOUS at 11:39

## 2025-08-17 RX ADMIN — PANTOPRAZOLE SODIUM 40 MG: 40 INJECTION, POWDER, FOR SOLUTION INTRAVENOUS at 11:57

## 2025-08-17 RX ADMIN — LACTULOSE SOLUTION USP, 10 G/15 ML 10 G: 10 SOLUTION ORAL; RECTAL at 09:19

## 2025-08-17 RX ADMIN — RIFAXIMIN 550 MG: 550 TABLET ORAL at 17:15

## 2025-08-17 RX ADMIN — Medication 1 TABLET: at 09:19

## 2025-08-17 RX ADMIN — RIFAXIMIN 550 MG: 550 TABLET ORAL at 21:00

## 2025-08-17 RX ADMIN — MIDODRINE HYDROCHLORIDE 10 MG: 5 TABLET ORAL at 11:51

## 2025-08-17 RX ADMIN — MIDODRINE HYDROCHLORIDE 10 MG: 5 TABLET ORAL at 17:15

## 2025-08-17 RX ADMIN — RIFAXIMIN 550 MG: 550 TABLET ORAL at 09:20

## 2025-08-17 RX ADMIN — THIAMINE HCL TAB 100 MG 100 MG: 100 TAB at 09:20

## 2025-08-17 ASSESSMENT — ACTIVITIES OF DAILY LIVING (ADL)
ADLS_ACUITY_SCORE: 40

## 2025-08-18 ENCOUNTER — APPOINTMENT (OUTPATIENT)
Dept: PHYSICAL THERAPY | Facility: HOSPITAL | Age: 75
DRG: 441 | End: 2025-08-18
Attending: NURSE PRACTITIONER
Payer: COMMERCIAL

## 2025-08-18 LAB
ALBUMIN SERPL BCG-MCNC: 3.9 G/DL (ref 3.5–5.2)
ALP SERPL-CCNC: 112 U/L (ref 40–150)
ALT SERPL W P-5'-P-CCNC: 48 U/L (ref 0–70)
ANION GAP SERPL CALCULATED.3IONS-SCNC: 18 MMOL/L (ref 7–15)
AST SERPL W P-5'-P-CCNC: 79 U/L (ref 0–45)
ATRIAL RATE - MUSE: 77 BPM
BACTERIA SPEC CULT: NO GROWTH
BACTERIA SPEC CULT: NO GROWTH
BILIRUB SERPL-MCNC: 13.5 MG/DL
BUN SERPL-MCNC: 63.4 MG/DL (ref 8–23)
CALCIUM SERPL-MCNC: 9.6 MG/DL (ref 8.8–10.4)
CHLORIDE SERPL-SCNC: 99 MMOL/L (ref 98–107)
CREAT SERPL-MCNC: 4.1 MG/DL (ref 0.67–1.17)
DIASTOLIC BLOOD PRESSURE - MUSE: 59 MMHG
EGFRCR SERPLBLD CKD-EPI 2021: 15 ML/MIN/1.73M2
ERYTHROCYTE [DISTWIDTH] IN BLOOD BY AUTOMATED COUNT: 19.1 % (ref 10–15)
GLUCOSE SERPL-MCNC: 146 MG/DL (ref 70–99)
HCO3 SERPL-SCNC: 21 MMOL/L (ref 22–29)
HCT VFR BLD AUTO: 25.6 % (ref 40–53)
HGB BLD-MCNC: 8.7 G/DL (ref 13.3–17.7)
INTERPRETATION ECG - MUSE: NORMAL
MCH RBC QN AUTO: 33.7 PG (ref 26.5–33)
MCHC RBC AUTO-ENTMCNC: 34 G/DL (ref 31.5–36.5)
MCV RBC AUTO: 99.2 FL (ref 78–100)
P AXIS - MUSE: 63 DEGREES
PLATELET # BLD AUTO: 137 10E3/UL (ref 150–450)
POTASSIUM SERPL-SCNC: 3.5 MMOL/L (ref 3.4–5.3)
PR INTERVAL - MUSE: 232 MS
PROT SERPL-MCNC: 5.9 G/DL (ref 6.4–8.3)
QRS DURATION - MUSE: 92 MS
QT - MUSE: 382 MS
QTC - MUSE: 432 MS
R AXIS - MUSE: 48 DEGREES
RBC # BLD AUTO: 2.58 10E6/UL (ref 4.4–5.9)
SODIUM SERPL-SCNC: 138 MMOL/L (ref 135–145)
SYSTOLIC BLOOD PRESSURE - MUSE: 121 MMHG
T AXIS - MUSE: -5 DEGREES
VENTRICULAR RATE- MUSE: 77 BPM
WBC # BLD AUTO: 7.73 10E3/UL (ref 4–11)

## 2025-08-18 PROCEDURE — 90935 HEMODIALYSIS ONE EVALUATION: CPT

## 2025-08-18 PROCEDURE — 250N000011 HC RX IP 250 OP 636: Performed by: HOSPITALIST

## 2025-08-18 PROCEDURE — 250N000013 HC RX MED GY IP 250 OP 250 PS 637: Performed by: STUDENT IN AN ORGANIZED HEALTH CARE EDUCATION/TRAINING PROGRAM

## 2025-08-18 PROCEDURE — 97530 THERAPEUTIC ACTIVITIES: CPT | Mod: GP

## 2025-08-18 PROCEDURE — 99232 SBSQ HOSP IP/OBS MODERATE 35: CPT | Performed by: HOSPITALIST

## 2025-08-18 PROCEDURE — 250N000013 HC RX MED GY IP 250 OP 250 PS 637: Performed by: INTERNAL MEDICINE

## 2025-08-18 PROCEDURE — 97116 GAIT TRAINING THERAPY: CPT | Mod: GP

## 2025-08-18 PROCEDURE — 36415 COLL VENOUS BLD VENIPUNCTURE: CPT | Performed by: INTERNAL MEDICINE

## 2025-08-18 PROCEDURE — 99231 SBSQ HOSP IP/OBS SF/LOW 25: CPT | Performed by: INTERNAL MEDICINE

## 2025-08-18 PROCEDURE — 85014 HEMATOCRIT: CPT | Performed by: INTERNAL MEDICINE

## 2025-08-18 PROCEDURE — 82040 ASSAY OF SERUM ALBUMIN: CPT | Performed by: INTERNAL MEDICINE

## 2025-08-18 PROCEDURE — 250N000013 HC RX MED GY IP 250 OP 250 PS 637: Performed by: HOSPITALIST

## 2025-08-18 PROCEDURE — 120N000001 HC R&B MED SURG/OB

## 2025-08-18 RX ORDER — MIDODRINE HYDROCHLORIDE 5 MG/1
15 TABLET ORAL
Status: DISCONTINUED | OUTPATIENT
Start: 2025-08-18 | End: 2025-08-19 | Stop reason: HOSPADM

## 2025-08-18 RX ORDER — PANTOPRAZOLE SODIUM 40 MG/1
40 TABLET, DELAYED RELEASE ORAL
Status: DISCONTINUED | OUTPATIENT
Start: 2025-08-18 | End: 2025-08-19 | Stop reason: HOSPADM

## 2025-08-18 RX ADMIN — MIDODRINE HYDROCHLORIDE 15 MG: 5 TABLET ORAL at 16:53

## 2025-08-18 RX ADMIN — DROXIDOPA 100 MG: 100 CAPSULE ORAL at 13:51

## 2025-08-18 RX ADMIN — THIAMINE HCL TAB 100 MG 100 MG: 100 TAB at 08:01

## 2025-08-18 RX ADMIN — CEFTRIAXONE SODIUM 2 G: 2 INJECTION, POWDER, FOR SOLUTION INTRAMUSCULAR; INTRAVENOUS at 13:46

## 2025-08-18 RX ADMIN — MIDODRINE HYDROCHLORIDE 10 MG: 5 TABLET ORAL at 08:01

## 2025-08-18 RX ADMIN — DROXIDOPA 100 MG: 100 CAPSULE ORAL at 08:02

## 2025-08-18 RX ADMIN — DROXIDOPA 100 MG: 100 CAPSULE ORAL at 20:59

## 2025-08-18 RX ADMIN — RIFAXIMIN 550 MG: 550 TABLET ORAL at 13:46

## 2025-08-18 RX ADMIN — MIDODRINE HYDROCHLORIDE 15 MG: 5 TABLET ORAL at 12:39

## 2025-08-18 RX ADMIN — PANTOPRAZOLE SODIUM 40 MG: 40 INJECTION, POWDER, FOR SOLUTION INTRAVENOUS at 08:01

## 2025-08-18 RX ADMIN — PANTOPRAZOLE SODIUM 40 MG: 40 TABLET, DELAYED RELEASE ORAL at 15:49

## 2025-08-18 RX ADMIN — LACTULOSE SOLUTION USP, 10 G/15 ML 10 G: 10 SOLUTION ORAL; RECTAL at 08:01

## 2025-08-18 RX ADMIN — RIFAXIMIN 550 MG: 550 TABLET ORAL at 15:49

## 2025-08-18 RX ADMIN — Medication 1 TABLET: at 08:01

## 2025-08-18 RX ADMIN — RIFAXIMIN 550 MG: 550 TABLET ORAL at 21:00

## 2025-08-18 ASSESSMENT — ACTIVITIES OF DAILY LIVING (ADL)
ADLS_ACUITY_SCORE: 40
ADLS_ACUITY_SCORE: 41
ADLS_ACUITY_SCORE: 41
ADLS_ACUITY_SCORE: 40
ADLS_ACUITY_SCORE: 45
ADLS_ACUITY_SCORE: 40
ADLS_ACUITY_SCORE: 45
ADLS_ACUITY_SCORE: 40
ADLS_ACUITY_SCORE: 40
ADLS_ACUITY_SCORE: 45
ADLS_ACUITY_SCORE: 45
ADLS_ACUITY_SCORE: 40
ADLS_ACUITY_SCORE: 40
ADLS_ACUITY_SCORE: 45

## 2025-08-19 ENCOUNTER — APPOINTMENT (OUTPATIENT)
Dept: INTERVENTIONAL RADIOLOGY/VASCULAR | Facility: HOSPITAL | Age: 75
DRG: 441 | End: 2025-08-19
Attending: NURSE PRACTITIONER
Payer: COMMERCIAL

## 2025-08-19 VITALS
HEART RATE: 60 BPM | DIASTOLIC BLOOD PRESSURE: 56 MMHG | HEIGHT: 69 IN | BODY MASS INDEX: 31.84 KG/M2 | SYSTOLIC BLOOD PRESSURE: 106 MMHG | TEMPERATURE: 98.1 F | WEIGHT: 215 LBS | RESPIRATION RATE: 18 BRPM | OXYGEN SATURATION: 98 %

## 2025-08-19 LAB
ALBUMIN SERPL BCG-MCNC: 3.7 G/DL (ref 3.5–5.2)
ALP SERPL-CCNC: 135 U/L (ref 40–150)
ALT SERPL W P-5'-P-CCNC: 52 U/L (ref 0–70)
ANION GAP SERPL CALCULATED.3IONS-SCNC: 18 MMOL/L (ref 7–15)
AST SERPL W P-5'-P-CCNC: ABNORMAL U/L
BILIRUB SERPL-MCNC: 13 MG/DL
BUN SERPL-MCNC: 42.5 MG/DL (ref 8–23)
CALCIUM SERPL-MCNC: 9.3 MG/DL (ref 8.8–10.4)
CHLORIDE SERPL-SCNC: 98 MMOL/L (ref 98–107)
CREAT SERPL-MCNC: 3.44 MG/DL (ref 0.67–1.17)
EGFRCR SERPLBLD CKD-EPI 2021: 18 ML/MIN/1.73M2
ERYTHROCYTE [DISTWIDTH] IN BLOOD BY AUTOMATED COUNT: 19.1 % (ref 10–15)
GLUCOSE SERPL-MCNC: 135 MG/DL (ref 70–99)
HBV CORE AB SERPL QL IA: NONREACTIVE
HBV SURFACE AB SERPL IA-ACNC: <3.5 M[IU]/ML
HBV SURFACE AB SERPL IA-ACNC: NONREACTIVE M[IU]/ML
HBV SURFACE AG SERPL QL IA: NONREACTIVE
HCO3 SERPL-SCNC: 20 MMOL/L (ref 22–29)
HCT VFR BLD AUTO: 25.8 % (ref 40–53)
HGB BLD-MCNC: 8.6 G/DL (ref 13.3–17.7)
MCH RBC QN AUTO: 32.8 PG (ref 26.5–33)
MCHC RBC AUTO-ENTMCNC: 33.3 G/DL (ref 31.5–36.5)
MCV RBC AUTO: 98.5 FL (ref 78–100)
PLATELET # BLD AUTO: 146 10E3/UL (ref 150–450)
POTASSIUM SERPL-SCNC: 3.9 MMOL/L (ref 3.4–5.3)
PROT SERPL-MCNC: 5.9 G/DL (ref 6.4–8.3)
RBC # BLD AUTO: 2.62 10E6/UL (ref 4.4–5.9)
SODIUM SERPL-SCNC: 136 MMOL/L (ref 135–145)
WBC # BLD AUTO: 8.44 10E3/UL (ref 4–11)

## 2025-08-19 PROCEDURE — 77001 FLUOROGUIDE FOR VEIN DEVICE: CPT

## 2025-08-19 PROCEDURE — 86704 HEP B CORE ANTIBODY TOTAL: CPT | Performed by: INTERNAL MEDICINE

## 2025-08-19 PROCEDURE — 250N000013 HC RX MED GY IP 250 OP 250 PS 637: Performed by: INTERNAL MEDICINE

## 2025-08-19 PROCEDURE — 36415 COLL VENOUS BLD VENIPUNCTURE: CPT | Performed by: INTERNAL MEDICINE

## 2025-08-19 PROCEDURE — 02PY33Z REMOVAL OF INFUSION DEVICE FROM GREAT VESSEL, PERCUTANEOUS APPROACH: ICD-10-PCS | Performed by: RADIOLOGY

## 2025-08-19 PROCEDURE — 250N000011 HC RX IP 250 OP 636: Performed by: HOSPITALIST

## 2025-08-19 PROCEDURE — 36415 COLL VENOUS BLD VENIPUNCTURE: CPT | Performed by: NURSE PRACTITIONER

## 2025-08-19 PROCEDURE — 250N000011 HC RX IP 250 OP 636: Performed by: RADIOLOGY

## 2025-08-19 PROCEDURE — 250N000011 HC RX IP 250 OP 636: Performed by: NURSE PRACTITIONER

## 2025-08-19 PROCEDURE — 86481 TB AG RESPONSE T-CELL SUSP: CPT | Performed by: NURSE PRACTITIONER

## 2025-08-19 PROCEDURE — 84155 ASSAY OF PROTEIN SERUM: CPT | Performed by: INTERNAL MEDICINE

## 2025-08-19 PROCEDURE — 250N000013 HC RX MED GY IP 250 OP 250 PS 637: Performed by: STUDENT IN AN ORGANIZED HEALTH CARE EDUCATION/TRAINING PROGRAM

## 2025-08-19 PROCEDURE — 99239 HOSP IP/OBS DSCHRG MGMT >30: CPT | Performed by: FAMILY MEDICINE

## 2025-08-19 PROCEDURE — 272N000500 HC NEEDLE CR2

## 2025-08-19 PROCEDURE — 02H633Z INSERTION OF INFUSION DEVICE INTO RIGHT ATRIUM, PERCUTANEOUS APPROACH: ICD-10-PCS | Performed by: RADIOLOGY

## 2025-08-19 PROCEDURE — C1769 GUIDE WIRE: HCPCS

## 2025-08-19 PROCEDURE — 0JH63XZ INSERTION OF TUNNELED VASCULAR ACCESS DEVICE INTO CHEST SUBCUTANEOUS TISSUE AND FASCIA, PERCUTANEOUS APPROACH: ICD-10-PCS | Performed by: RADIOLOGY

## 2025-08-19 PROCEDURE — 250N000009 HC RX 250: Performed by: NURSE PRACTITIONER

## 2025-08-19 PROCEDURE — 250N000013 HC RX MED GY IP 250 OP 250 PS 637: Performed by: HOSPITALIST

## 2025-08-19 PROCEDURE — 99232 SBSQ HOSP IP/OBS MODERATE 35: CPT | Performed by: NURSE PRACTITIONER

## 2025-08-19 PROCEDURE — 86706 HEP B SURFACE ANTIBODY: CPT | Performed by: INTERNAL MEDICINE

## 2025-08-19 PROCEDURE — 85027 COMPLETE CBC AUTOMATED: CPT | Performed by: INTERNAL MEDICINE

## 2025-08-19 PROCEDURE — 87340 HEPATITIS B SURFACE AG IA: CPT | Performed by: INTERNAL MEDICINE

## 2025-08-19 PROCEDURE — C1750 CATH, HEMODIALYSIS,LONG-TERM: HCPCS

## 2025-08-19 RX ORDER — NALOXONE HYDROCHLORIDE 0.4 MG/ML
0.4 INJECTION, SOLUTION INTRAMUSCULAR; INTRAVENOUS; SUBCUTANEOUS
Status: DISCONTINUED | OUTPATIENT
Start: 2025-08-19 | End: 2025-08-19 | Stop reason: HOSPADM

## 2025-08-19 RX ORDER — CLINDAMYCIN PHOSPHATE 900 MG/50ML
900 INJECTION, SOLUTION INTRAVENOUS
Status: COMPLETED | OUTPATIENT
Start: 2025-08-19 | End: 2025-08-19

## 2025-08-19 RX ORDER — PANTOPRAZOLE SODIUM 40 MG/1
40 TABLET, DELAYED RELEASE ORAL
Qty: 60 TABLET | Refills: 0 | Status: SHIPPED | OUTPATIENT
Start: 2025-08-19 | End: 2025-09-18

## 2025-08-19 RX ORDER — ONDANSETRON 2 MG/ML
4 INJECTION INTRAMUSCULAR; INTRAVENOUS
Status: DISCONTINUED | OUTPATIENT
Start: 2025-08-19 | End: 2025-08-19 | Stop reason: HOSPADM

## 2025-08-19 RX ORDER — MIDODRINE HYDROCHLORIDE 5 MG/1
15 TABLET ORAL
Qty: 270 TABLET | Refills: 0 | Status: SHIPPED | OUTPATIENT
Start: 2025-08-19 | End: 2025-09-18

## 2025-08-19 RX ORDER — DROXIDOPA 100 MG/1
100 CAPSULE ORAL 3 TIMES DAILY
Qty: 90 CAPSULE | Refills: 0 | Status: SHIPPED | OUTPATIENT
Start: 2025-08-19 | End: 2025-09-18

## 2025-08-19 RX ORDER — FENTANYL CITRATE 50 UG/ML
25-50 INJECTION, SOLUTION INTRAMUSCULAR; INTRAVENOUS EVERY 5 MIN PRN
Status: DISCONTINUED | OUTPATIENT
Start: 2025-08-19 | End: 2025-08-19 | Stop reason: HOSPADM

## 2025-08-19 RX ORDER — NALOXONE HYDROCHLORIDE 0.4 MG/ML
0.2 INJECTION, SOLUTION INTRAMUSCULAR; INTRAVENOUS; SUBCUTANEOUS
Status: DISCONTINUED | OUTPATIENT
Start: 2025-08-19 | End: 2025-08-19 | Stop reason: HOSPADM

## 2025-08-19 RX ORDER — FLUMAZENIL 0.1 MG/ML
0.2 INJECTION, SOLUTION INTRAVENOUS
Status: DISCONTINUED | OUTPATIENT
Start: 2025-08-19 | End: 2025-08-19 | Stop reason: HOSPADM

## 2025-08-19 RX ORDER — HEPARIN SODIUM 1000 [USP'U]/ML
3800 INJECTION, SOLUTION INTRAVENOUS; SUBCUTANEOUS ONCE
Status: COMPLETED | OUTPATIENT
Start: 2025-08-19 | End: 2025-08-19

## 2025-08-19 RX ADMIN — RIFAXIMIN 550 MG: 550 TABLET ORAL at 10:12

## 2025-08-19 RX ADMIN — FENTANYL CITRATE 50 MCG: 50 INJECTION, SOLUTION INTRAMUSCULAR; INTRAVENOUS at 13:03

## 2025-08-19 RX ADMIN — Medication 1 TABLET: at 08:13

## 2025-08-19 RX ADMIN — LACTULOSE SOLUTION USP, 10 G/15 ML 10 G: 10 SOLUTION ORAL; RECTAL at 08:13

## 2025-08-19 RX ADMIN — MIDODRINE HYDROCHLORIDE 15 MG: 5 TABLET ORAL at 12:04

## 2025-08-19 RX ADMIN — MIDODRINE HYDROCHLORIDE 15 MG: 5 TABLET ORAL at 08:13

## 2025-08-19 RX ADMIN — LIDOCAINE HYDROCHLORIDE 1 ML: 10; .005 INJECTION, SOLUTION EPIDURAL; INFILTRATION; INTRACAUDAL; PERINEURAL at 13:09

## 2025-08-19 RX ADMIN — MIDAZOLAM HYDROCHLORIDE 0.5 MG: 1 INJECTION, SOLUTION INTRAMUSCULAR; INTRAVENOUS at 13:07

## 2025-08-19 RX ADMIN — THIAMINE HCL TAB 100 MG 100 MG: 100 TAB at 08:13

## 2025-08-19 RX ADMIN — DROXIDOPA 100 MG: 100 CAPSULE ORAL at 08:13

## 2025-08-19 RX ADMIN — PANTOPRAZOLE SODIUM 40 MG: 40 TABLET, DELAYED RELEASE ORAL at 16:36

## 2025-08-19 RX ADMIN — LIDOCAINE HYDROCHLORIDE 15 ML: 10 INJECTION, SOLUTION INFILTRATION; PERINEURAL at 13:09

## 2025-08-19 RX ADMIN — DROXIDOPA 100 MG: 100 CAPSULE ORAL at 13:46

## 2025-08-19 RX ADMIN — MIDAZOLAM HYDROCHLORIDE 0.5 MG: 1 INJECTION, SOLUTION INTRAMUSCULAR; INTRAVENOUS at 13:00

## 2025-08-19 RX ADMIN — HEPARIN SODIUM 3800 UNITS: 1000 INJECTION INTRAVENOUS; SUBCUTANEOUS at 13:15

## 2025-08-19 RX ADMIN — MIDODRINE HYDROCHLORIDE 15 MG: 5 TABLET ORAL at 16:35

## 2025-08-19 RX ADMIN — CEFTRIAXONE SODIUM 2 G: 2 INJECTION, POWDER, FOR SOLUTION INTRAMUSCULAR; INTRAVENOUS at 10:11

## 2025-08-19 RX ADMIN — PANTOPRAZOLE SODIUM 40 MG: 40 TABLET, DELAYED RELEASE ORAL at 06:34

## 2025-08-19 RX ADMIN — RIFAXIMIN 550 MG: 550 TABLET ORAL at 16:35

## 2025-08-19 RX ADMIN — CLINDAMYCIN PHOSPHATE 900 MG: 900 INJECTION, SOLUTION INTRAVENOUS at 12:06

## 2025-08-19 ASSESSMENT — ACTIVITIES OF DAILY LIVING (ADL)
ADLS_ACUITY_SCORE: 40
ADLS_ACUITY_SCORE: 43
ADLS_ACUITY_SCORE: 43
ADLS_ACUITY_SCORE: 40
ADLS_ACUITY_SCORE: 43
ADLS_ACUITY_SCORE: 40
ADLS_ACUITY_SCORE: 40
ADLS_ACUITY_SCORE: 43
ADLS_ACUITY_SCORE: 40
ADLS_ACUITY_SCORE: 40
ADLS_ACUITY_SCORE: 43
ADLS_ACUITY_SCORE: 40
ADLS_ACUITY_SCORE: 40

## 2025-08-20 ENCOUNTER — PATIENT OUTREACH (OUTPATIENT)
Dept: CARE COORDINATION | Facility: CLINIC | Age: 75
End: 2025-08-20
Payer: COMMERCIAL

## 2025-08-21 LAB
GAMMA INTERFERON BACKGROUND BLD IA-ACNC: 0.07 IU/ML
M TB IFN-G BLD-IMP: NEGATIVE
M TB IFN-G CD4+ BCKGRND COR BLD-ACNC: 0.96 IU/ML
MITOGEN IGNF BCKGRD COR BLD-ACNC: 0.01 IU/ML
MITOGEN IGNF BCKGRD COR BLD-ACNC: 0.01 IU/ML
QUANTIFERON MITOGEN: 1.03 IU/ML
QUANTIFERON NIL TUBE: 0.07 IU/ML
QUANTIFERON TB1 TUBE: 0.08 IU/ML
QUANTIFERON TB2 TUBE: 0.08

## 2025-08-25 ENCOUNTER — TELEPHONE (OUTPATIENT)
Dept: PHARMACY | Facility: HOSPITAL | Age: 75
End: 2025-08-25
Payer: COMMERCIAL

## (undated) DEVICE — SOLUTION WATER 1000ML BOTTLE R5000-01

## (undated) DEVICE — TUBING SUCTION MEDI-VAC 1/4"X20' N620A

## (undated) DEVICE — NDL SCLEROTHERAPY 25GA CARR-LOCK  00711811

## (undated) DEVICE — FORCEP BIOPSY 2.3MM DISP COATED 000388

## (undated) DEVICE — SUCTION MANIFOLD NEPTUNE 2 SYS 1 PORT 702-025-000

## (undated) DEVICE — Device

## (undated) DEVICE — CLIP HEMOSTASIS ASSURANCE W16 MM BX00711884

## (undated) DEVICE — SOL WATER IRRIG 1000ML BOTTLE 2F7114

## (undated) RX ORDER — HEPARIN SODIUM 1000 [USP'U]/ML
INJECTION, SOLUTION INTRAVENOUS; SUBCUTANEOUS
Status: DISPENSED
Start: 2025-08-19

## (undated) RX ORDER — FENTANYL CITRATE 50 UG/ML
INJECTION, SOLUTION INTRAMUSCULAR; INTRAVENOUS
Status: DISPENSED
Start: 2025-08-19

## (undated) RX ORDER — HEPARIN SODIUM 1000 [USP'U]/ML
INJECTION, SOLUTION INTRAVENOUS; SUBCUTANEOUS
Status: DISPENSED
Start: 2025-08-15

## (undated) RX ORDER — LIDOCAINE HYDROCHLORIDE 10 MG/ML
INJECTION, SOLUTION INFILTRATION; PERINEURAL
Status: DISPENSED
Start: 2025-08-15

## (undated) RX ORDER — HEPARIN SODIUM 1000 [USP'U]/ML
INJECTION, SOLUTION INTRAVENOUS; SUBCUTANEOUS
Status: DISPENSED
Start: 2025-08-16

## (undated) RX ORDER — ALBUMIN (HUMAN) 12.5 G/50ML
SOLUTION INTRAVENOUS
Status: DISPENSED
Start: 2025-08-15

## (undated) RX ORDER — LIDOCAINE HYDROCHLORIDE 10 MG/ML
INJECTION, SOLUTION INFILTRATION; PERINEURAL
Status: DISPENSED
Start: 2025-08-19

## (undated) RX ORDER — HEPARIN SODIUM 1000 [USP'U]/ML
INJECTION, SOLUTION INTRAVENOUS; SUBCUTANEOUS
Status: DISPENSED
Start: 2025-08-18

## (undated) RX ORDER — LIDOCAINE HYDROCHLORIDE AND EPINEPHRINE 10; 10 MG/ML; UG/ML
INJECTION, SOLUTION INFILTRATION; PERINEURAL
Status: DISPENSED
Start: 2025-08-19